# Patient Record
Sex: MALE
[De-identification: names, ages, dates, MRNs, and addresses within clinical notes are randomized per-mention and may not be internally consistent; named-entity substitution may affect disease eponyms.]

---

## 2017-07-05 ENCOUNTER — HOSPITAL ENCOUNTER (INPATIENT)
Dept: HOSPITAL 42 - ED | Age: 57
LOS: 3 days | Discharge: NURSING, REHAB OR LONG-TERM CARE | DRG: 593 | End: 2017-07-08
Attending: INTERNAL MEDICINE | Admitting: INTERNAL MEDICINE
Payer: MEDICARE

## 2017-07-05 VITALS — BODY MASS INDEX: 52.4 KG/M2

## 2017-07-05 DIAGNOSIS — Z87.891: ICD-10-CM

## 2017-07-05 DIAGNOSIS — I48.91: ICD-10-CM

## 2017-07-05 DIAGNOSIS — B95.62: ICD-10-CM

## 2017-07-05 DIAGNOSIS — E78.5: ICD-10-CM

## 2017-07-05 DIAGNOSIS — L97.919: Primary | ICD-10-CM

## 2017-07-05 DIAGNOSIS — Z86.14: ICD-10-CM

## 2017-07-05 DIAGNOSIS — I50.9: ICD-10-CM

## 2017-07-05 DIAGNOSIS — F81.9: ICD-10-CM

## 2017-07-05 DIAGNOSIS — E66.01: ICD-10-CM

## 2017-07-05 DIAGNOSIS — L85.3: ICD-10-CM

## 2017-07-05 DIAGNOSIS — L03.116: ICD-10-CM

## 2017-07-05 DIAGNOSIS — I11.0: ICD-10-CM

## 2017-07-05 DIAGNOSIS — L03.115: ICD-10-CM

## 2017-07-05 DIAGNOSIS — G62.9: ICD-10-CM

## 2017-07-05 DIAGNOSIS — Z79.899: ICD-10-CM

## 2017-07-05 DIAGNOSIS — R40.2412: ICD-10-CM

## 2017-07-05 DIAGNOSIS — G40.909: ICD-10-CM

## 2017-07-05 DIAGNOSIS — E11.622: ICD-10-CM

## 2017-07-05 DIAGNOSIS — I89.0: ICD-10-CM

## 2017-07-05 DIAGNOSIS — L81.9: ICD-10-CM

## 2017-07-05 DIAGNOSIS — F79: ICD-10-CM

## 2017-07-05 DIAGNOSIS — I87.8: ICD-10-CM

## 2017-07-05 LAB
ALBUMIN SERPL-MCNC: 4.2 G/DL
ALBUMIN/GLOB SERPL: 1.2 {RATIO}
ALT SERPL-CCNC: 37 U/L
AST SERPL-CCNC: 28 U/L
BASOPHILS # BLD AUTO: 0.04 K/MM3
BASOPHILS NFR BLD: 0.5 %
BUN SERPL-MCNC: 12 MG/DL
CALCIUM SERPL-MCNC: 9.7 MG/DL
EOSINOPHIL # BLD: 0.1 10*3/UL
EOSINOPHIL NFR BLD: 1.6 %
ERYTHROCYTE [DISTWIDTH] IN BLOOD BY AUTOMATED COUNT: 12.8 %
GFR NON-AFRICAN AMERICAN: > 60
GRANULOCYTES # BLD: 6.64 10*3/UL
GRANULOCYTES NFR BLD: 76.1 %
HGB BLD-MCNC: 14.4 GM/DL
LYMPHOCYTES # BLD: 1.2 10*3/UL
LYMPHOCYTES NFR BLD AUTO: 14.2 %
MCH RBC QN AUTO: 29.9 PG
MCHC RBC AUTO-ENTMCNC: 33.6 G/DL
MCV RBC AUTO: 89 FL
MONOCYTES # BLD AUTO: 0.7 10*3/UL
MONOCYTES NFR BLD: 7.6 %
PLATELET # BLD: 299 10^3/UL
PMV BLD AUTO: 10.3 FL
RBC # BLD AUTO: 4.82 10^6/UL
WBC # BLD AUTO: 8.7 10^3/UL

## 2017-07-05 RX ADMIN — VANCOMYCIN HYDROCHLORIDE SCH MLS/HR: 1 INJECTION, POWDER, LYOPHILIZED, FOR SOLUTION INTRAVENOUS at 17:26

## 2017-07-05 RX ADMIN — INSULIN HUMAN SCH UNITS: 100 INJECTION, SOLUTION PARENTERAL at 17:28

## 2017-07-05 RX ADMIN — CEFEPIME SCH MLS/HR: 1 INJECTION, SOLUTION INTRAVENOUS at 17:21

## 2017-07-05 RX ADMIN — CEFEPIME SCH MLS/HR: 1 INJECTION, SOLUTION INTRAVENOUS at 22:10

## 2017-07-05 RX ADMIN — INSULIN HUMAN SCH: 100 INJECTION, SOLUTION PARENTERAL at 23:06

## 2017-07-05 RX ADMIN — DILTIAZEM HYDROCHLORIDE SCH MG: 120 CAPSULE, COATED, EXTENDED RELEASE ORAL at 16:27

## 2017-07-05 NOTE — US
HISTORY:

Leg pain and swelling. Evaluate for DVT



PHYSICIAN(S):  Con Armstrong MD.



TECHNIQUE:

Duplex sonography and color-flow Doppler with graded compression were 

used to evaluate the deep venous systems of both lower extremities. 

The exam is limited by body habitus and edema. The calf veins are not 

adequately seen.



FINDINGS:

The visualized deep venous systems of both lower extremities are 

sonographically normal and compressible. Normal wave forms and 

augmentation are seen. There is no sonographic evidence for deep 

venous thrombosis in the visualized segments of both lower 

extremities.



IMPRESSION:

No sonographic evidence for deep venous thrombosis in the visualized 

segments of both lower extremities. 



Limited exam

## 2017-07-05 NOTE — ED PDOC
Arrival/HPI





- General


Chief Complaint: Lower Extremity Problem/Injury


Time Seen by Provider: 07/05/17 11:07


Historian: Patient, Caregiver





- History of Present Illness


Narrative History of Present Illness (Text): 


07/05/17 11:38


A 57 year old male was sent into the emergency department by podiatrist for 

possible cellulitis to bilateral lower extremities. Caregiver reports patient 

has failed several outpatient antibiotic treatments. Patient denies any fever, 

chills, nausea, vomiting, diarrhea, abdominal pain, chest pain, shortness of 

breath or any other complaints. 





PMD: Dr. Peraza


Podiatrist: Dr. Ramirez





Time/Duration: < month


Symptom Course: Unchanged


Quality: Other


Context: Other





Past Medical History





- Provider Review


Nursing Documentation Reviewed: Yes





- Infectious Disease


Hx of Infectious Diseases: None





- Tetanus Immunization


Tetanus Immunization: Unknown





- Cardiac


Hx Cardiac Disorders: Yes


Hx Congestive Heart Failure: Yes


Hx Hypertension: Yes





- Pulmonary


Hx Chronic Obstructive Pulmonary Disease (COPD): No





- Neurological


Other/Comment: Epilepsy





- HEENT


Hx HEENT Disorder: Yes


Other/Comment: wears glasses





- Renal


Hx Renal Failure: No





- Endocrine/Metabolic


Hx Diabetes Mellitus Type 2: Yes





- Hematological/Oncological


Hx Blood Disorders: No


Hx AIDS: No


Hx Anemia: No


Hx Cancer: No


Hx Chemotherapy: No


Hx Cirrhosis: No


Hx Hepatitis A: No


Hx Hepatitis B: No


Hx Hepatitis C: No


Hx Metastasis: No


Hx Shingles: No


Hx Unexplained Bleeding: No


Other/Comment: blood infection





- Integumentary


Hx Dermatological Disorder: Yes


Hx Basal Cell Carcinoma: No


Hx Eczema: No


Hx Melanoma: No


Hx Psoriasis: No


Hx Squamous Cell Carcinoma: No


Other/Comment: multiple wounds on the rt leg, cellulitis in b/l extreminity. 

Left thigh cellulitis





- Musculoskeletal/Rheumatological


Hx Falls: Yes





- Gastrointestinal


Hx Gastrointestinal Disorders: No


Hx Colostomy: No


Hx Crohn's Disease: No


Hx Diverticulitis: No


Hx Gall Bladder Disease: No


Hx Gastroesophageal Reflux: No


Hx Ileostomy: No


Hx Liver Failure: No


Hx Pancreatitis: No


HX Swallowing Problems: No





- Genitourinary/Gynecological


Hx Genitourinary Disorders: No


Hx Hematuria: No


Hx Incontinence: No


Hx Prostate Problems: No


Hx Sexually Transmitted Diseases: No


Hx Urinary Tract Infection: No





- Psychiatric


Hx Psychophysiologic Disorder: Yes


Hx Substance Use: No


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





- Surgical History


Hx Amputation: No


Hx Appendectomy: No


Hx Cholecystectomy: No


Hx Gastric Bypass Surgery: No


Hx Hysterectomy: No


Hx Joint Replacement: No


Hx Kidney Transplant: No


Hx Liver Transplant: No


Hx Mastectomy: No


Hx Musculoskeletal Surgery: No


Hx Open Heart Surgery: No


Hx Orthopedic Surgery: No


Hx Splenectomy: No


Hx Valve Replacement: No





- Anesthesia


Hx Anesthesia: Yes


Hx Anesthesia Reactions: No


Hx Malignant Hyperthermia: No





- Suicidal Assessment


Feels Threatened In Home Enviroment: No





Family/Social History





- Physician Review


Nursing Documentation Reviewed: Yes


Family/Social History: No Known Family HX


Smoking Status: Former Smoker


Hx Alcohol Use: No


Hx Substance Use: No


Hx Substance Use Treatment: No





Allergies/Home Meds


Allergies/Adverse Reactions: 


Allergies





No Known Allergies Allergy (Verified 07/05/17 13:00)


 








Home Medications: 


 Home Meds











 Medication  Instructions  Recorded  Confirmed


 


Omeprazole [Prilosec] 20 mg PO DAILY 08/28/15 07/05/17


 


Ramipril [Altace] 10 mg PO DAILY 08/28/15 07/05/17


 


Glipizide 10 mg PO [facility] 11/22/15 07/05/17


 


Pioglitazone [Actos] 45 mg PO DAILY #0  03/09/16 07/05/17


 


Potassium Chloride [K-Tab ER] 20 meq PO DAILY #0  03/09/16 07/05/17


 


Atorvastatin [Lipitor] 10 mg PO DIN 08/27/16 07/05/17


 


Iron Fumarate/Vit C/Vit B12/FA 1 cap PO DAILY 08/29/16 07/05/17





[Trigels-F Forte Softgel]   


 


Insulin Glargine,Hum.rec.anlog 45 unit SQ  07/05/17 07/05/17





[Lantus Solostar]   


 


Metformin HCl [Glucophage] 1,000 mg PO ACBD 07/05/17 07/05/17














Review of Systems





- Physician Review


All systems were reviewed & negative as marked: Yes





- Review of Systems


Constitutional: absent: Fevers, Night Sweats


Respiratory: absent: SOB


Cardiovascular: absent: Chest Pain


Gastrointestinal: absent: Abdominal Pain, Diarrhea, Nausea, Vomiting


Skin: Cellulitis (to bilateral lower extremities)





Physical Exam


Vital Signs Reviewed: Yes


Vital Signs











  Temp Pulse Resp BP Pulse Ox


 


 07/05/17 12:06   79  18  144/79  97


 


 07/05/17 11:12   89  18  146/81  97


 


 07/05/17 11:02  97.9 F  96 H  16  146/81  98











Temperature: Afebrile


Blood Pressure: Normal


Pulse: Tachycardic


Respiratory Rate: Normal


Appearance: Positive for: Well-Appearing, Non-Toxic, Comfortable


Pain Distress: None


Mental Status: Positive for: Alert and Oriented X 3


Finger Stick Blood Glucose: 411





- Systems Exam


Head: Present: Atraumatic, Normocephalic


Pupils: Present: PERRL


Extroacular Muscles: Present: EOMI


Conjunctiva: Present: Normal


Mouth: Present: Moist Mucous Membranes


Neck: Present: Normal Range of Motion


Respiratory/Chest: Present: Clear to Auscultation, Good Air Exchange.  No: 

Respiratory Distress, Accessory Muscle Use


Cardiovascular: Present: Regular Rate and Rhythm, Normal S1, S2.  No: Murmurs


Abdomen: Present: Normal Bowel Sounds.  No: Tenderness, Distention, Peritoneal 

Signs


Back: Present: Normal Inspection


Upper Extremity: Present: Normal Inspection.  No: Cyanosis, Edema


Lower Extremity: Present: NORMAL PULSES, Neurovascularly Intact, Other (

bilateral lower extremity cellulitis).  No: Edema, CALF TENDERNESS, Deformity


Neurological: Present: GCS=15, CN II-XII Intact, Speech Normal


Skin: Present: Warm, Dry, Normal Color.  No: Rashes


Psychiatric: Present: Alert, Oriented x 3, Normal Insight, Normal Concentration





Medical Decision Making


ED Course and Treatment: 


07/05/17 11:38


Impression:


A 57 year old male with cellulitis to bilateral lower extremities





Plan:


-- Lower extremity duplex ultrasound 


-- Labs


-- Blood and Wound culture


-- IV fluids, Vancomycin and insulin


-- Reassess and disposition





Progress Notes:


07/05/17 12:52


Ultrasound is negative for DVT.








- Lab Interpretations


Lab Results: 








 07/05/17 11:15 





 07/05/17 11:15 





 Lab Results





07/05/17 11:15: Sodium 134, Potassium 4.5, Chloride 99, Carbon Dioxide 24, 

Anion Gap 16, BUN 12, Creatinine 0.6, Est GFR (African Amer) > 60, Est GFR (Non-

Af Amer) > 60, Random Glucose 452 H* D, Calcium 9.7, Total Bilirubin 0.7, AST 28

, ALT 37, Alkaline Phosphatase 135 H, Total Protein 7.8, Albumin 4.2, Globulin 

3.5, Albumin/Globulin Ratio 1.2


07/05/17 11:15: WBC 8.7, RBC 4.82, Hgb 14.4, Hct 42.9, MCV 89.0, MCH 29.9, MCHC 

33.6, RDW 12.8, Plt Count 299, MPV 10.3, Gran % 76.1 H, Lymph % (Auto) 14.2 L, 

Mono % (Auto) 7.6 H, Eos % (Auto) 1.6, Baso % (Auto) 0.5, Gran # 6.64 H, Lymph 

# 1.2, Mono # 0.7 H, Eos # 0.1, Baso # 0.04











- RAD Interpretation


Radiology Orders: 








07/05/17 12:05


DUPLEX LOWER EXTRM VEIN BILAT [US] Stat 














- Medication Orders


Current Medication Orders: 








Atorvastatin Calcium (Lipitor)  10 mg PO DIN [facility]


   Last Admin: 07/05/17 17:29  Dose: 10 mg





Diltiazem HCl (Cardizem Cd)  120 mg PO DAILY [facility]


   Last Admin: 07/05/17 16:27  Dose: 120 mg





Furosemide (Lasix)  20 mg PO DAILY [facility]


   Last Admin: 07/05/17 17:29  Dose: 20 mg





Gabapentin (Neurontin)  400 mg PO TID [facility]


   PRN Reason: Protocol


   Last Admin: 07/05/17 17:29  Dose: 400 mg





Glipizide (Glucotrol)  10 mg PO ACBD [facility]


   Last Admin: 07/05/17 17:27  Dose: 10 mg





Cefepime HCl (Maxipime 1gm)  1 gm in 100 mls @ 100 mls/hr IVPB Q8 [facility]


   PRN Reason: Protocol


   Last Admin: 07/05/17 22:10  Dose: 100 mls/hr





Vancomycin HCl (Vancomycin 1gm)  1 gm in 250 mls @ 167 mls/hr IVPB Q12H [facility]


   PRN Reason: Protocol


   Last Admin: 07/05/17 17:26  Dose: 167 mls/hr





Insulin Human Regular (Humulin R High)  0 units SC ACHS [facility]


   PRN Reason: Protocol


   Last Admin: 07/05/17 17:28  Dose: 10 units





Metformin HCl (Glucophage)  1,000 mg PO ACBD [facility]


   Last Admin: 07/05/17 17:27  Dose: 1,000 mg





Potassium Chloride (K-Dur 20 Meq Er Tab)  20 meq PO DAILY [facility]


Ramipril (Altace)  10 mg PO DAILY [facility]


Warfarin Sodium (Coumadin)  10 mg PO DAILY [facility]


   PRN Reason: Protocol


Warfarin Sodium (Coumadin)  2 mg PO DAILY [facility]


   PRN Reason: Protocol





Discontinued Medications





Vancomycin HCl (Vancomycin 1gm)  1 gm in 250 mls @ 167 mls/hr IVPB STAT STA


   PRN Reason: Protocol


   Stop: 07/05/17 13:34


   Last Admin: 07/05/17 12:27  Dose: 167 mls/hr





Sodium Chloride (Sodium Chloride 0.9%)  1,000 mls @ 200 mls/hr IV .Q5H [facility]


   Last Admin: 07/05/17 12:26  Dose: 200 mls/hr





Insulin Human Regular (Humulin R)  6 units IVP STAT STA


   Stop: 07/05/17 12:09


   Last Admin: 07/05/17 12:29  Dose: 6 units





Pneumococcal Polyvalent Vaccine (Pneumovax 23 Vaccine)  0.5 ml IM .ONCE ONE


   Stop: 07/05/17 19:02











- Scribe Statement


The provider has reviewed the documentation as recorded by the Stanislaw Stone





Provider Scribe Attestation:


All medical record entries made by the Garrettibdaniella were at my direction and 

personally dictated by me. I have reviewed the chart and agree that the record 

accurately reflects my personal performance of the history, physical exam, 

medical decision making, and the department course for this patient. I have 

also personally directed, reviewed, and agree with the discharge instructions 

and disposition.








Disposition/Present on Arrival





- Present on Arrival


Any Indicators Present on Arrival: Yes


History of DVT/PE: No


History of Uncontrolled Diabetes: Yes


Urinary Catheter: No


History of Decub. Ulcer: No


History Surgical Site Infection Following: None





- Disposition


Have Diagnosis and Disposition been Completed?: Yes


Diagnosis: 


 Cellulitis





Disposition: HOSPITALIZED


Disposition Time: 12:05


Condition: STABLE

## 2017-07-06 VITALS — RESPIRATION RATE: 20 BRPM

## 2017-07-06 LAB
INR PPP: 1.01
PROTHROMBIN TIME: 10.9 SECONDS

## 2017-07-06 RX ADMIN — DILTIAZEM HYDROCHLORIDE SCH MG: 120 CAPSULE, COATED, EXTENDED RELEASE ORAL at 09:41

## 2017-07-06 RX ADMIN — VANCOMYCIN HYDROCHLORIDE SCH MLS/HR: 1 INJECTION, POWDER, LYOPHILIZED, FOR SOLUTION INTRAVENOUS at 04:05

## 2017-07-06 RX ADMIN — INSULIN HUMAN SCH UNITS: 100 INJECTION, SOLUTION PARENTERAL at 08:00

## 2017-07-06 RX ADMIN — POTASSIUM CHLORIDE SCH MEQ: 20 TABLET, EXTENDED RELEASE ORAL at 09:41

## 2017-07-06 RX ADMIN — INSULIN HUMAN SCH UNITS: 100 INJECTION, SOLUTION PARENTERAL at 11:25

## 2017-07-06 RX ADMIN — CEFEPIME SCH MLS/HR: 1 INJECTION, SOLUTION INTRAVENOUS at 21:21

## 2017-07-06 RX ADMIN — INSULIN HUMAN SCH UNITS: 100 INJECTION, SOLUTION PARENTERAL at 17:07

## 2017-07-06 RX ADMIN — CEFEPIME SCH MLS/HR: 1 INJECTION, SOLUTION INTRAVENOUS at 06:14

## 2017-07-06 RX ADMIN — CEFEPIME SCH MLS/HR: 1 INJECTION, SOLUTION INTRAVENOUS at 13:18

## 2017-07-06 RX ADMIN — VANCOMYCIN HYDROCHLORIDE SCH MLS/HR: 1 INJECTION, POWDER, LYOPHILIZED, FOR SOLUTION INTRAVENOUS at 17:05

## 2017-07-06 NOTE — CP.PCM.HP
History of Present Illness





- History of Present Illness


History of Present Illness: 





Pt sent due to R leg ulcer. He has a hx of MRSA and frequent infections in the 

legs. He has chronic skin changes and chronic wounds. He denies SOB/CP/N/V/HA/

Fever. He is no able to give a good history due to his cognitive issues.  





Present on Admission





- Present on Admission


Any Indicators Present on Admission: Yes


History of DVT/PE: Yes





Review of Systems





- Constitutional


Constitutional: absent: Frequent Falls, Lethargy, Malaise





- EENT


Eyes: absent: Diplopia, Loss of Peripheral Vision, Photophobia


Ears: absent: Disequilibrium


Nose/Mouth/Throat: absent: Dry Mouth





- Cardiovascular


Cardiovascular: Leg Edema.  absent: Claudication





- Respiratory


Respiratory: absent: Cough





Past Patient History





- Infectious Disease


Hx of Infectious Diseases: None





- Tetanus Immunizations


Tetanus Immunization: Unknown





- Past Medical History & Family History


Past Medical History?: Yes





- Past Social History


Smoking Status: Former Smoker





- CARDIAC


Hx Cardiac Disorders: Yes


Hx Congestive Heart Failure: Yes


Hx Hypertension: Yes





- PULMONARY


Hx Chronic Obstructive Pulmonary Disease (COPD): No





- NEUROLOGICAL


Other/Comment: Epilepsy





- HEENT


Hx HEENT Problems: Yes


Other/Comment: wears glasses





- RENAL


Hx Renal Failure: No





- ENDOCRINE/METABOLIC


Hx Diabetes Mellitus Type 2: Yes





- HEMATOLOGICAL/ONCOLOGICAL


Hx Blood Disorders: No


Hx AIDS: No


Hx Anemia: No


Hx Cancer: No


Hx Chemotherapy: No


Hx Cirrhosis: No


Hx Hepatitis A: No


Hx Hepatitis B: No


Hx Hepatitis C: No


Hx Metastesis: No


Hx Shingles: No


Hx Unexplained Bleeding: No


Other/Comment: blood infection





- INTEGUMENTARY


Hx Dermatological Problems: Yes


Hx Basil Cell: No


Hx Eczema: No


Hx Melanoma: No


Hx Psoriasis: No


Hx Squamous Cell: No


Other/Comment: multiple wounds on the rt leg, cellulitis in b/l extreminity. 

Left thigh cellulitis





- MUSCULOSKELETAL/RHEUMATOLOGICAL


Hx Falls: Yes





- GASTROINTESTINAL


Hx Gastrointestinal Disorders: No


Hx Colostomy: No


Hx Crohn's Disease: No


Hx Diverticulitis: No


Hx Gall Bladder Disease: No


Hx Gastroesophageal Reflux: No


Hx Ileostomy: No


Hx Liver Failure: No


Hx Pancreatitis: No


HX Swallowing Problems: No





- GENITOURINARY/GYNECOLOGICAL


Hx Genitourinary Disorders: No


Hx Hematuria: No


Hx Incontinence: No


Hx Prostate Problems: No


Hx Sexually Transmitted Disorders: No


Hx Urinary Tract Infection: No





- PSYCHIATRIC


Hx Psychophysiologic Disorder: Yes


Hx Substance Use: No


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





- SURGICAL HISTORY


Hx Amputation: No


Hx Appendectomy: No


Hx Cholecystectomy: No


Hx Gastric Bypass Surgery: No


Hx Hysterectomy: No


Hx Joint Replacement: No


Hx Kidney Transplant: No


Hx Liver Transplant: No


Hx Mastectomy: No


Hx Musculoskeletal Surgery: No


Hx Open Heart Surgery: No


Hx Orthopedic Surgery: No


Hx Splenectomy: No


Hx Valve Replacement: No





- ANESTHESIA


Hx Anesthesia: Yes


Hx Anesthesia Reactions: No


Hx Malignant Hyperthermia: No





Meds


Allergies/Adverse Reactions: 


 Allergies











Allergy/AdvReac Type Severity Reaction Status Date / Time


 


No Known Allergies Allergy   Verified 07/05/17 13:00














Physical Exam





- Head Exam


Head Exam: ATRAUMATIC, NORMAL INSPECTION, NORMOCEPHALIC





- Eye Exam


Eye Exam: EOMI, Normal appearance, PERRL


Pupil Exam: NORMAL ACCOMODATION, PERRL





- ENT Exam


ENT Exam: Mucous Membranes Moist, Normal Exam





- Neck Exam


Neck exam: Positive for: Normal Inspection





- Respiratory Exam


Respiratory Exam: Clear to Auscultation Bilateral, NORMAL BREATHING PATTERN





- Cardiovascular Exam


Cardiovascular Exam: RRR, +S1, +S2





- GI/Abdominal Exam


GI & Abdominal Exam: Normal Bowel Sounds, Soft.  absent: Organomegaly, 

Pulsatile Mass, Tenderness





-  Exam


 Exam: NORMAL INSPECTION





- Back Exam


Back exam: NORMAL INSPECTION.  absent: CVA tenderness (R)





- Neurological Exam


Neurological exam: Alert, CN II-XII Intact, Normal Gait, Oriented x3, Reflexes 

Normal





- Skin


Skin Exam: Erythema, Pallor, Rash





Results





- Vital Signs


Recent Vital Signs: 





 Last Vital Signs











Temp  98.2 F   07/06/17 16:30


 


Pulse  72   07/06/17 16:30


 


Resp  20   07/06/17 16:30


 


BP  164/84 H  07/06/17 16:30


 


Pulse Ox  99   07/06/17 16:30














- Labs


Result Diagrams: 


 07/05/17 11:15





 07/05/17 11:15


Labs: 





 Laboratory Results - last 24 hr











  07/05/17 07/06/17





  13:08 06:45


 


PT   10.9


 


INR   1.01


 


POC Glucose (mg/dL)  349 H 














Assessment & Plan





- Assessment and Plan (Free Text)


Assessment: 





Cellulitis


DM-2


A Fib


Obesit


HTN


Dylipidemia





Plan: 





Pt will need to be admited for IV Abx. He is going to be seen by Dr Ramirez 

from Podiatry and by ID. He is going to be covered by Insulin. He will be given 

Coumadin for his A fib. Spoke to Dr Ramirez. 





- Date & Time


Date: 07/06/17


Time: 08:25

## 2017-07-06 NOTE — CP.PCM.CON
<Danae Iyer - Last Filed: 07/06/17 10:43>





History of Present Illness





- History of Present Illness


History of Present Illness: 


57 year old male with PMHx including DM, CHF, hypertension, atrial fibrillation

, elephantiasis, dyslipidemia, was seen today with attending Dr. Ramirez 

regarding  right lower extremity ulcerations and bilateral lower extremity 

chronic edema. Patient is resting comfortably in bed. Bilateral lower 

extremitie bandages are clean dry and intact. No new complaints at this time. 

Patient denies any n/v/f/c/sob/cp. 





Past Patient History





- Infectious Disease


Hx of Infectious Diseases: None





- Tetanus Immunizations


Tetanus Immunization: Unknown





- Past Medical History & Family History


Past Medical History?: Yes





- Past Social History


Smoking Status: Former Smoker





- CARDIAC


Hx Cardiac Disorders: Yes


Hx Congestive Heart Failure: Yes


Hx Hypertension: Yes





- PULMONARY


Hx Chronic Obstructive Pulmonary Disease (COPD): No





- NEUROLOGICAL


Other/Comment: Epilepsy





- HEENT


Hx HEENT Problems: Yes


Other/Comment: wears glasses





- RENAL


Hx Renal Failure: No





- ENDOCRINE/METABOLIC


Hx Diabetes Mellitus Type 2: Yes





- HEMATOLOGICAL/ONCOLOGICAL


Hx Blood Disorders: No


Hx AIDS: No


Hx Anemia: No


Hx Cancer: No


Hx Chemotherapy: No


Hx Cirrhosis: No


Hx Hepatitis A: No


Hx Hepatitis B: No


Hx Hepatitis C: No


Hx Metastesis: No


Hx Shingles: No


Hx Unexplained Bleeding: No


Other/Comment: blood infection





- INTEGUMENTARY


Hx Dermatological Problems: Yes


Hx Basil Cell: No


Hx Eczema: No


Hx Melanoma: No


Hx Psoriasis: No


Hx Squamous Cell: No


Other/Comment: multiple wounds on the rt leg, cellulitis in b/l extreminity. 

Left thigh cellulitis





- MUSCULOSKELETAL/RHEUMATOLOGICAL


Hx Falls: Yes





- GASTROINTESTINAL


Hx Gastrointestinal Disorders: No


Hx Colostomy: No


Hx Crohn's Disease: No


Hx Diverticulitis: No


Hx Gall Bladder Disease: No


Hx Gastroesophageal Reflux: No


Hx Ileostomy: No


Hx Liver Failure: No


Hx Pancreatitis: No


HX Swallowing Problems: No





- GENITOURINARY/GYNECOLOGICAL


Hx Genitourinary Disorders: No


Hx Hematuria: No


Hx Incontinence: No


Hx Prostate Problems: No


Hx Sexually Transmitted Disorders: No


Hx Urinary Tract Infection: No





- PSYCHIATRIC


Hx Psychophysiologic Disorder: Yes


Hx Substance Use: No


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





- SURGICAL HISTORY


Hx Amputation: No


Hx Appendectomy: No


Hx Cholecystectomy: No


Hx Gastric Bypass Surgery: No


Hx Hysterectomy: No


Hx Joint Replacement: No


Hx Kidney Transplant: No


Hx Liver Transplant: No


Hx Mastectomy: No


Hx Musculoskeletal Surgery: No


Hx Open Heart Surgery: No


Hx Orthopedic Surgery: No


Hx Splenectomy: No


Hx Valve Replacement: No





- ANESTHESIA


Hx Anesthesia: Yes


Hx Anesthesia Reactions: No


Hx Malignant Hyperthermia: No





Meds


Allergies/Adverse Reactions: 


 Allergies











Allergy/AdvReac Type Severity Reaction Status Date / Time


 


No Known Allergies Allergy   Verified 07/05/17 13:00














- Medications


Medications: 


 Current Medications





Atorvastatin Calcium (Lipitor)  10 mg PO DIN [facility]


   Last Admin: 07/05/17 17:29 Dose:  10 mg


Diltiazem HCl (Cardizem Cd)  120 mg PO DAILY [facility]


   Last Admin: 07/06/17 09:41 Dose:  120 mg


Furosemide (Lasix)  20 mg PO DAILY [facility]


   Last Admin: 07/06/17 09:41 Dose:  20 mg


Gabapentin (Neurontin)  400 mg PO TID [facility]


   PRN Reason: Protocol


   Last Admin: 07/06/17 09:40 Dose:  400 mg


Glipizide (Glucotrol)  10 mg PO ACBD [facility]


   Last Admin: 07/06/17 08:00 Dose:  10 mg


Cefepime HCl (Maxipime 1gm)  1 gm in 100 mls @ 100 mls/hr IVPB Q8 [facility]


   PRN Reason: Protocol


   Last Admin: 07/06/17 06:14 Dose:  100 mls/hr


Vancomycin HCl (Vancomycin 1gm)  1 gm in 250 mls @ 167 mls/hr IVPB Q12H [facility]


   PRN Reason: Protocol


   Last Admin: 07/06/17 04:05 Dose:  167 mls/hr


Insulin Human Regular (Humulin R High)  0 units SC ACHS [facility]


   PRN Reason: Protocol


   Last Admin: 07/06/17 08:00 Dose:  7 units


Metformin HCl (Glucophage)  1,000 mg PO ACBD [facility]


   Last Admin: 07/06/17 08:00 Dose:  1,000 mg


Potassium Chloride (K-Dur 20 Meq Er Tab)  20 meq PO DAILY [facility]


   Last Admin: 07/06/17 09:41 Dose:  20 meq


Ramipril (Altace)  10 mg PO DAILY [facility]


   Last Admin: 07/06/17 09:40 Dose:  10 mg


Warfarin Sodium (Coumadin)  10 mg PO DAILY [facility]


   PRN Reason: Protocol


   Last Admin: 07/06/17 09:41 Dose:  10 mg


Warfarin Sodium (Coumadin)  2 mg PO DAILY [facility]


   PRN Reason: Protocol


   Last Admin: 07/06/17 09:41 Dose:  2 mg











Physical Exam





- Constitutional


Appears: No Acute Distress





- Extremities Exam


Additional comments: 


Lower extremity focused exam:





VASC: Diffuse edema of bilateral lower extremities. Skin temperature is 

increased on the right





NEURO: Gross sensation intact





DERM: Open ulceration to right lower extremity with granular base at the 

lateral aspect of the leg with sanguinous drainage, no purulence, no erythema, 

moderate malodor. Two additional small superficial ulcerations present to the 

anterior and lateral leg with sanguinous drainage. Surrounding skin is 

erythematous and hyperpigmented. Left leg has an open ulcer to the lateral 

aspect just distal to the knee, mild xerosis and hyperpigmentation. 





ORTHO: Negative pain on palpation to the lower extremities





- Neurological Exam


Neurological exam: Alert, Oriented x3





- Psychiatric Exam


Psychiatric exam: Normal Affect, Normal Mood





Results





- Vital Signs


Recent Vital Signs: 


 Last Vital Signs











Temp  97.6 F   07/06/17 08:12


 


Pulse  89   07/06/17 09:41


 


Resp  20   07/06/17 08:12


 


BP  170/88 H  07/06/17 09:41


 


Pulse Ox  94 L  07/06/17 08:12














- Labs


Result Diagrams: 


 07/05/17 11:15





 07/05/17 11:15


Labs: 


 Laboratory Results - last 24 hr











  07/05/17 07/06/17





  16:13 06:45


 


PT   10.9


 


INR   1.01


 


POC Glucose (mg/dL)  325 H 














Assessment & Plan





- Assessment and Plan (Free Text)


Assessment: 


57 year old male with right lower extremity cellulitis with venous stasis 

ulceration and bilateral chronic lower extremity edema. 


Plan: 


Patient examined and evaluated with an attending Dr. Ramirez


Patients bilateral legs cleansed with saline


LE US negative for DVT


Right leg dressed with xeroform, ABD, kerlix


Left leg dressed with maxorb, optifoam


Patient to continue IV abx 


Podiatry will continue to follow, pt will f/u in the wound care center after DC 





<Leah Ramirez - Last Filed: 07/08/17 14:52>





Results





- Vital Signs


Recent Vital Signs: 


 Last Vital Signs











Temp  98.2 F   07/08/17 08:00


 


Pulse  65   07/08/17 08:00


 


Resp  20   07/08/17 08:00


 


BP  178/88 H  07/08/17 09:24


 


Pulse Ox  94 L  07/08/17 08:00














- Labs


Result Diagrams: 


 07/05/17 11:15





 07/05/17 11:15





Attending/Attestation





- Attestation


I have personally seen and examined this patient.: Yes


I have fully participated in the care of the patient.: Yes


I have reviewed all pertinent clinical information: Yes

## 2017-07-07 VITALS — OXYGEN SATURATION: 94 %

## 2017-07-07 RX ADMIN — INSULIN HUMAN SCH UNITS: 100 INJECTION, SOLUTION PARENTERAL at 08:18

## 2017-07-07 RX ADMIN — INSULIN HUMAN SCH: 100 INJECTION, SOLUTION PARENTERAL at 22:28

## 2017-07-07 RX ADMIN — CEFEPIME SCH MLS/HR: 1 INJECTION, SOLUTION INTRAVENOUS at 14:39

## 2017-07-07 RX ADMIN — VANCOMYCIN HYDROCHLORIDE SCH MLS/HR: 1 INJECTION, POWDER, LYOPHILIZED, FOR SOLUTION INTRAVENOUS at 16:53

## 2017-07-07 RX ADMIN — INSULIN HUMAN SCH: 100 INJECTION, SOLUTION PARENTERAL at 11:38

## 2017-07-07 RX ADMIN — DILTIAZEM HYDROCHLORIDE SCH MG: 120 CAPSULE, COATED, EXTENDED RELEASE ORAL at 09:22

## 2017-07-07 RX ADMIN — CEFEPIME SCH MLS/HR: 1 INJECTION, SOLUTION INTRAVENOUS at 21:54

## 2017-07-07 RX ADMIN — CEFEPIME SCH MLS/HR: 1 INJECTION, SOLUTION INTRAVENOUS at 05:29

## 2017-07-07 RX ADMIN — POTASSIUM CHLORIDE SCH MEQ: 20 TABLET, EXTENDED RELEASE ORAL at 09:21

## 2017-07-07 RX ADMIN — INSULIN HUMAN SCH: 100 INJECTION, SOLUTION PARENTERAL at 16:52

## 2017-07-07 RX ADMIN — VANCOMYCIN HYDROCHLORIDE SCH MLS/HR: 1 INJECTION, POWDER, LYOPHILIZED, FOR SOLUTION INTRAVENOUS at 05:29

## 2017-07-07 NOTE — CP.PCM.PN
Subjective





- Date & Time of Evaluation


Date of Evaluation: 07/07/17


Time of Evaluation: 08:29





- Subjective


Subjective: 





Pt with no pain. Eating well. Sleeps well. 





Objective





- Vital Signs/Intake and Output


Vital Signs (last 24 hours): 


 











Temp Pulse Resp BP Pulse Ox


 


 98.5 F   73   20   170/96 H  97 


 


 07/07/17 07:30  07/07/17 07:30  07/07/17 07:30  07/07/17 07:30  07/07/17 07:30








Intake and Output: 


 











 07/07/17 07/07/17





 06:59 18:59


 


Intake Total 960 


 


Output Total 1250 


 


Balance -290 














- Medications


Medications: 


 Current Medications





Atorvastatin Calcium (Lipitor)  10 mg PO DIN [facility]


   Last Admin: 07/06/17 17:07 Dose:  10 mg


Diltiazem HCl (Cardizem Cd)  120 mg PO DAILY [facility]


   Last Admin: 07/06/17 09:41 Dose:  120 mg


Furosemide (Lasix)  20 mg PO DAILY [facility]


   Last Admin: 07/06/17 09:41 Dose:  20 mg


Gabapentin (Neurontin)  400 mg PO TID [facility]


   PRN Reason: Protocol


   Last Admin: 07/06/17 17:07 Dose:  400 mg


Glipizide (Glucotrol)  10 mg PO ACBD [facility]


   Last Admin: 07/06/17 17:07 Dose:  10 mg


Cefepime HCl (Maxipime 1gm)  1 gm in 100 mls @ 100 mls/hr IVPB Q8 [facility]


   PRN Reason: Protocol


   Last Admin: 07/07/17 05:29 Dose:  100 mls/hr


Vancomycin HCl (Vancomycin 1gm)  1 gm in 250 mls @ 167 mls/hr IVPB Q12H [facility]


   PRN Reason: Protocol


   Last Admin: 07/07/17 05:29 Dose:  167 mls/hr


Insulin Human Regular (Humulin R High)  0 units SC ACHS [facility]


   PRN Reason: Protocol


   Last Admin: 07/06/17 17:07 Dose:  4 units


Metformin HCl (Glucophage)  1,000 mg PO ACBD [facility]


   Last Admin: 07/06/17 17:07 Dose:  1,000 mg


Potassium Chloride (K-Dur 20 Meq Er Tab)  20 meq PO DAILY [facility]


   Last Admin: 07/06/17 09:41 Dose:  20 meq


Ramipril (Altace)  10 mg PO DAILY [facility]


   Last Admin: 07/06/17 09:40 Dose:  10 mg


Warfarin Sodium (Coumadin)  10 mg PO DAILY [facility]


   PRN Reason: Protocol


   Last Admin: 07/06/17 09:41 Dose:  10 mg


Warfarin Sodium (Coumadin)  2 mg PO DAILY [facility]


   PRN Reason: Protocol


   Last Admin: 07/06/17 09:41 Dose:  2 mg











- Labs


Labs: 


 











PT  10.9 Seconds (9.9-11.8)   07/06/17  06:45    


 


INR  1.01  (0.93-1.08)   07/06/17  06:45    














- Constitutional


Appears: Non-toxic





- Head Exam


Head Exam: NORMAL INSPECTION





- Eye Exam


Eye Exam: EOMI, Normal appearance, PERRL





- ENT Exam


ENT Exam: Mucous Membranes Moist, Normal Exam





- Neck Exam


Neck Exam: Full ROM, Normal Inspection.  absent: Lymphadenopathy





- Respiratory Exam


Respiratory Exam: Clear to Ausculation Bilateral, NORMAL BREATHING PATTERN.  

absent: Prolonged Expiratory Phase, Rales, Respiratory Distress





- Cardiovascular Exam


Cardiovascular Exam: REGULAR RHYTHM, +S1, +S2.  absent: Murmur





- GI/Abdominal Exam


GI & Abdominal Exam: Soft, Normal Bowel Sounds.  absent: Tenderness





- Rectal Exam


Rectal Exam: NORMAL INSPECTION





Assessment and Plan





- Assessment and Plan (Free Text)


Assessment: 





Cellulitis


DM-2


A Fib


Obesit


HTN


Dylipidemia








. 


Plan: 








Pt will need to be admited for IV Abx- Vanco. Dr Ramirez from Podiatry and by 

ID following. Will get Surgery evaluation with Dr Marshall, known to him very 

well. He is going to be covered by Insulin. He will be given Coumadin for his A 

fib. On ISS for DM-2. FS with coverage. On Neurontin for neuropathy. On 

Cardiezem for A fib. Lipitor for Dyslipidemia. TCU evaluation.

## 2017-07-07 NOTE — CP.PCM.PN
<Danae Iyer - Last Filed: 07/07/17 11:50>





Subjective





- Date & Time of Evaluation


Date of Evaluation: 07/07/17


Time of Evaluation: 11:50





- Subjective


Subjective: 


57 year old male was seen today resting in a chair at bedside regarding right 

lower extremity cellulitis, ulcerations and bilateral lower extremity chronic 

edema. Bilateral lower extremity bandages are clean dry and intact. No new 

complaints at this time. Patient denies any n/v/f/c/sob/cp. 





Objective





- Vital Signs/Intake and Output


Vital Signs (last 24 hours): 


 











Temp Pulse Resp BP Pulse Ox


 


 98.5 F   73   20   170/96 H  97 


 


 07/07/17 07:30  07/07/17 09:22  07/07/17 07:30  07/07/17 09:22  07/07/17 07:30








Intake and Output: 


 











 07/07/17 07/07/17





 06:59 18:59


 


Intake Total 960 


 


Output Total 1250 


 


Balance -290 














- Medications


Medications: 


 Current Medications





Atorvastatin Calcium (Lipitor)  10 mg PO DIN [facility]


   Last Admin: 07/06/17 17:07 Dose:  10 mg


Diltiazem HCl (Cardizem Cd)  120 mg PO DAILY [facility]


   Last Admin: 07/07/17 09:22 Dose:  120 mg


Furosemide (Lasix)  20 mg PO DAILY [facility]


   Last Admin: 07/07/17 09:21 Dose:  20 mg


Gabapentin (Neurontin)  400 mg PO TID [facility]


   PRN Reason: Protocol


   Last Admin: 07/07/17 09:26 Dose:  400 mg


Glipizide (Glucotrol)  10 mg PO ACBD [facility]


   Last Admin: 07/07/17 08:19 Dose:  10 mg


Cefepime HCl (Maxipime 1gm)  1 gm in 100 mls @ 100 mls/hr IVPB Q8 [facility]


   PRN Reason: Protocol


   Last Admin: 07/07/17 05:29 Dose:  100 mls/hr


Vancomycin HCl (Vancomycin 1gm)  1 gm in 250 mls @ 167 mls/hr IVPB Q12H [facility]


   PRN Reason: Protocol


   Last Admin: 07/07/17 05:29 Dose:  167 mls/hr


Insulin Human Regular (Humulin R High)  0 units SC ACHS [facility]


   PRN Reason: Protocol


   Last Admin: 07/07/17 08:18 Dose:  4 units


Metformin HCl (Glucophage)  1,000 mg PO ACBD [facility]


   Last Admin: 07/07/17 08:19 Dose:  1,000 mg


Potassium Chloride (K-Dur 20 Meq Er Tab)  20 meq PO DAILY [facility]


   Last Admin: 07/07/17 09:21 Dose:  20 meq


Ramipril (Altace)  10 mg PO DAILY [facility]


   Last Admin: 07/07/17 09:22 Dose:  10 mg


Warfarin Sodium (Coumadin)  10 mg PO DAILY [facility]


   PRN Reason: Protocol


   Last Admin: 07/07/17 09:25 Dose:  10 mg


Warfarin Sodium (Coumadin)  2 mg PO DAILY [facility]


   PRN Reason: Protocol


   Last Admin: 07/07/17 09:26 Dose:  2 mg











- Labs


Labs: 


 











PT  10.9 Seconds (9.9-11.8)   07/06/17  06:45    


 


INR  1.01  (0.93-1.08)   07/06/17  06:45    














- Constitutional


Appears: Non-toxic, No Acute Distress





- Extremities Exam


Additional comments: 


Lower extremity focused exam:





VASC: Diffuse edema of bilateral lower extremities. Skin temperature is 

increased on the right lower extremity compared to left





NEURO: Gross sensation intact





DERM: Open ulcerations to right lower extremity with granular base at the 

lateral aspect of the leg with sanguinous drainage, no purulence, no erythema, 

moderate malodor. Two additional small superficial ulcerations present to the 

anterior and lateral leg with sanguinous drainage. Surrounding skin is 

erythematous and hyperpigmented. Left leg has an open ulcer to the lateral 

aspect just distal to the knee with moderate amount of serous drainage, mild 

xerosis and hyperpigmentation. 





ORTHO: Negative pain on palpation to the lower extremities





- Neurological Exam


Neurological Exam: Alert, Awake





- Psychiatric Exam


Psychiatric exam: Normal Mood





Assessment and Plan





- Assessment and Plan (Free Text)


Assessment: 


57 year old male with right lower extremity cellulitis with venous stasis 

ulceration and bilateral chronic lower extremity edema. 


Plan: 


Patient examined and evaluated 


discussed in detail with attending Dr. Hobbs


Patients bilateral legs cleansed with saline


LE US negative for DVT


Right leg dressed with xeroform, ABD, kerlix


Left leg dressed with maxorb, optifoam


Patient to continue IV abx per ID


Podiatry will continue to follow, pt will f/u in the wound care center after DC 





<Manuel Hobbs - Last Filed: 07/07/17 12:18>





Objective





- Vital Signs/Intake and Output


Vital Signs (last 24 hours): 


 











Temp Pulse Resp BP Pulse Ox


 


 98.5 F   73   20   170/96 H  97 


 


 07/07/17 07:30  07/07/17 09:22  07/07/17 07:30  07/07/17 09:22  07/07/17 07:30








Intake and Output: 


 











 07/07/17 07/07/17





 06:59 18:59


 


Intake Total 960 


 


Output Total 1250 


 


Balance -290 














- Medications


Medications: 


 Current Medications





Atorvastatin Calcium (Lipitor)  10 mg PO DIN [facility]


   Last Admin: 07/06/17 17:07 Dose:  10 mg


Diltiazem HCl (Cardizem Cd)  120 mg PO DAILY [facility]


   Last Admin: 07/07/17 09:22 Dose:  120 mg


Furosemide (Lasix)  20 mg PO DAILY [facility]


   Last Admin: 07/07/17 09:21 Dose:  20 mg


Gabapentin (Neurontin)  400 mg PO TID [facility]


   PRN Reason: Protocol


   Last Admin: 07/07/17 09:26 Dose:  400 mg


Glipizide (Glucotrol)  10 mg PO ACBD [facility]


   Last Admin: 07/07/17 08:19 Dose:  10 mg


Cefepime HCl (Maxipime 1gm)  1 gm in 100 mls @ 100 mls/hr IVPB Q8 [facility]


   PRN Reason: Protocol


   Last Admin: 07/07/17 05:29 Dose:  100 mls/hr


Vancomycin HCl (Vancomycin 1gm)  1 gm in 250 mls @ 167 mls/hr IVPB Q12H [facility]


   PRN Reason: Protocol


   Last Admin: 07/07/17 05:29 Dose:  167 mls/hr


Insulin Human Regular (Humulin R High)  0 units SC ACHS [facility]


   PRN Reason: Protocol


   Last Admin: 07/07/17 08:18 Dose:  4 units


Metformin HCl (Glucophage)  1,000 mg PO ACBD [facility]


   Last Admin: 07/07/17 08:19 Dose:  1,000 mg


Potassium Chloride (K-Dur 20 Meq Er Tab)  20 meq PO DAILY [facility]


   Last Admin: 07/07/17 09:21 Dose:  20 meq


Ramipril (Altace)  10 mg PO DAILY [facility]


   Last Admin: 07/07/17 09:22 Dose:  10 mg


Warfarin Sodium (Coumadin)  10 mg PO DAILY [facility]


   PRN Reason: Protocol


   Last Admin: 07/07/17 09:25 Dose:  10 mg


Warfarin Sodium (Coumadin)  2 mg PO DAILY [facility]


   PRN Reason: Protocol


   Last Admin: 07/07/17 09:26 Dose:  2 mg











- Labs


Labs: 


 











PT  10.9 Seconds (9.9-11.8)   07/06/17  06:45    


 


INR  1.01  (0.93-1.08)   07/06/17  06:45    














Attending/Attestation





- Attestation


I have personally seen and examined this patient.: Yes


I have fully participated in the care of the patient.: Yes


I have reviewed all pertinent clinical information, including history, physical 

exam and plan: Yes

## 2017-07-07 NOTE — CP.PCM.CON
History of Present Illness





- History of Present Illness


History of Present Illness: 





57M PMHx of MRSA and leg infections presented to the ED for bilateral lower 

extremity cellulitis. Pt states continued change in color of his legs. Denies 

pain, fever, chest pain, SOB, abd pain, N/V. Pt is poor historian.





PMHx: MRSA cellulitis


PSHx: Hernia repair


Meds: per Med Rec





Review of Systems





- Constitutional


Constitutional: As Per HPI.  absent: Headache





- Cardiovascular


Cardiovascular: Leg Edema, Leg Ulcers.  absent: Chest Pain





- Respiratory


Respiratory: absent: Cough, Dyspnea





- Gastrointestinal


Gastrointestinal: absent: Abdominal Pain, Change in Bowel Habits





- Musculoskeletal


Musculoskeletal: Abnormal Gait





Past Patient History





- Infectious Disease


Hx of Infectious Diseases: None, MRSA





- Tetanus Immunizations


Tetanus Immunization: Unknown





- Past Medical History & Family History


Past Medical History?: Yes





- Past Social History


Smoking Status: Former Smoker





- CARDIAC


Hx Cardiac Disorders: Yes


Hx Congestive Heart Failure: Yes


Hx Hypertension: Yes





- PULMONARY


Hx Chronic Obstructive Pulmonary Disease (COPD): No





- NEUROLOGICAL


Other/Comment: Epilepsy





- HEENT


Hx HEENT Problems: Yes


Other/Comment: wears glasses





- RENAL


Hx Renal Failure: No





- ENDOCRINE/METABOLIC


Hx Diabetes Mellitus Type 2: Yes





- HEMATOLOGICAL/ONCOLOGICAL


Hx Blood Disorders: No


Hx AIDS: No


Hx Anemia: No


Hx Cancer: No


Hx Chemotherapy: No


Hx Cirrhosis: No


Hx Hepatitis A: No


Hx Hepatitis B: No


Hx Hepatitis C: No


Hx Metastesis: No


Hx Shingles: No


Hx Unexplained Bleeding: No


Other/Comment: blood infection





- INTEGUMENTARY


Hx Dermatological Problems: Yes


Hx Basil Cell: No


Hx Eczema: No


Hx Melanoma: No


Hx Psoriasis: No


Hx Squamous Cell: No


Other/Comment: multiple wounds on the rt leg, cellulitis in b/l extreminity. 

Left thigh cellulitis





- MUSCULOSKELETAL/RHEUMATOLOGICAL


Hx Falls: Yes





- GASTROINTESTINAL


Hx Gastrointestinal Disorders: No


Hx Colostomy: No


Hx Crohn's Disease: No


Hx Diverticulitis: No


Hx Gall Bladder Disease: No


Hx Gastroesophageal Reflux: No


Hx Ileostomy: No


Hx Liver Failure: No


Hx Pancreatitis: No


HX Swallowing Problems: No





- GENITOURINARY/GYNECOLOGICAL


Hx Genitourinary Disorders: No


Hx Hematuria: No


Hx Incontinence: No


Hx Prostate Problems: No


Hx Sexually Transmitted Disorders: No


Hx Urinary Tract Infection: No





- PSYCHIATRIC


Hx Psychophysiologic Disorder: Yes


Hx Substance Use: No


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





- SURGICAL HISTORY


Hx Amputation: No


Hx Appendectomy: No


Hx Cholecystectomy: No


Hx Gastric Bypass Surgery: No


Hx Hysterectomy: No


Hx Joint Replacement: No


Hx Kidney Transplant: No


Hx Liver Transplant: No


Hx Mastectomy: No


Hx Musculoskeletal Surgery: No


Hx Open Heart Surgery: No


Hx Orthopedic Surgery: No


Hx Splenectomy: No


Hx Valve Replacement: No





- ANESTHESIA


Hx Anesthesia: Yes


Hx Anesthesia Reactions: No


Hx Malignant Hyperthermia: No





Meds


Allergies/Adverse Reactions: 


 Allergies











Allergy/AdvReac Type Severity Reaction Status Date / Time


 


No Known Allergies Allergy   Verified 07/05/17 13:00














- Medications


Medications: 


 Current Medications





Atorvastatin Calcium (Lipitor)  10 mg PO DIN [facility]


   Last Admin: 07/06/17 17:07 Dose:  10 mg


Diltiazem HCl (Cardizem Cd)  120 mg PO DAILY [facility]


   Last Admin: 07/07/17 09:22 Dose:  120 mg


Furosemide (Lasix)  20 mg PO DAILY [facility]


   Last Admin: 07/07/17 09:21 Dose:  20 mg


Gabapentin (Neurontin)  400 mg PO TID [facility]


   PRN Reason: Protocol


   Last Admin: 07/07/17 09:26 Dose:  400 mg


Glipizide (Glucotrol)  10 mg PO ACBD [facility]


   Last Admin: 07/07/17 08:19 Dose:  10 mg


Cefepime HCl (Maxipime 1gm)  1 gm in 100 mls @ 100 mls/hr IVPB Q8 [facility]


   PRN Reason: Protocol


   Last Admin: 07/07/17 05:29 Dose:  100 mls/hr


Vancomycin HCl (Vancomycin 1gm)  1 gm in 250 mls @ 167 mls/hr IVPB Q12H [facility]


   PRN Reason: Protocol


   Last Admin: 07/07/17 05:29 Dose:  167 mls/hr


Insulin Human Regular (Humulin R High)  0 units SC ACHS [facility]


   PRN Reason: Protocol


   Last Admin: 07/07/17 08:18 Dose:  4 units


Metformin HCl (Glucophage)  1,000 mg PO ACBD [facility]


   Last Admin: 07/07/17 08:19 Dose:  1,000 mg


Potassium Chloride (K-Dur 20 Meq Er Tab)  20 meq PO DAILY [facility]


   Last Admin: 07/07/17 09:21 Dose:  20 meq


Ramipril (Altace)  10 mg PO DAILY [facility]


   Last Admin: 07/07/17 09:22 Dose:  10 mg


Warfarin Sodium (Coumadin)  10 mg PO DAILY [facility]


   PRN Reason: Protocol


   Last Admin: 07/07/17 09:25 Dose:  10 mg


Warfarin Sodium (Coumadin)  2 mg PO DAILY [facility]


   PRN Reason: Protocol


   Last Admin: 07/07/17 09:26 Dose:  2 mg











Physical Exam





- Constitutional


Appears: No Acute Distress


Additional comments: 





Only responds to questions as yes and no





- Head Exam


Head Exam: ATRAUMATIC





- Eye Exam


Eye Exam: Normal appearance, PERRL





- Respiratory Exam


Respiratory Exam: Clear to Auscultation Bilateral, NORMAL BREATHING PATTERN.  

absent: Accessory Muscle Use





- Cardiovascular Exam


Cardiovascular Exam: REGULAR RHYTHM, +S1, +S2





- GI/Abdominal Exam


GI & Abdominal Exam: Normal Bowel Sounds, Soft.  absent: Firm, Organomegaly, 

Pulsatile Mass, Tenderness


Additional comments: 





old scar 5cm below umbilicus





- Extremities Exam


Extremities exam: Positive for: joint swelling


Additional comments: 





Skin erosion bilateral. dark, non-erythema left leg. Right leg in bandage 

during time of examination. Removed bandage and re-dressed. Right leg stage 2 

ulcer. 





- Skin


Additional comments: 





Right leg ulcer measuring below the tibial plateau to 4cm above the malleolus. 





Results





- Vital Signs


Recent Vital Signs: 


 Last Vital Signs











Temp  98.5 F   07/07/17 07:30


 


Pulse  73   07/07/17 09:22


 


Resp  20   07/07/17 07:30


 


BP  170/96 H  07/07/17 09:22


 


Pulse Ox  97   07/07/17 07:30














- Labs


Result Diagrams: 


 07/05/17 11:15





 07/05/17 11:15


Labs: 


 Laboratory Results - last 24 hr











  07/05/17





  13:08


 


POC Glucose (mg/dL)  349 H














Assessment & Plan





- Assessment and Plan (Free Text)


Assessment: 





57M with hx of leg ulcers and MRSA


Plan: 





- continue with dressing changes


- follow ID recs


- wound care per podiatry


- no surgical intervention at this time








will discuss with Dr. Rolando Hargrove PGY1

## 2017-07-07 NOTE — CP.PCM.PN
Subjective





- Date & Time of Evaluation


Date of Evaluation: 07/07/17


Time of Evaluation: 10:10





- Subjective


Subjective: 





Comfortable on a chair, not in distress, afebrile.





Objective





- Vital Signs/Intake and Output


Vital Signs (last 24 hours): 


 











Temp Pulse Resp BP Pulse Ox


 


 98.5 F   73   20   170/96 H  97 


 


 07/07/17 07:30  07/07/17 07:30  07/07/17 07:30  07/07/17 07:30  07/07/17 07:30








Intake and Output: 


 











 07/07/17 07/07/17





 06:59 18:59


 


Intake Total 960 


 


Output Total 1250 


 


Balance -290 














- Medications


Medications: 


 Current Medications





Atorvastatin Calcium (Lipitor)  10 mg PO DIN [facility]


   Last Admin: 07/06/17 17:07 Dose:  10 mg


Diltiazem HCl (Cardizem Cd)  120 mg PO DAILY [facility]


   Last Admin: 07/06/17 09:41 Dose:  120 mg


Furosemide (Lasix)  20 mg PO DAILY [facility]


   Last Admin: 07/06/17 09:41 Dose:  20 mg


Gabapentin (Neurontin)  400 mg PO TID [facility]


   PRN Reason: Protocol


   Last Admin: 07/06/17 17:07 Dose:  400 mg


Glipizide (Glucotrol)  10 mg PO ACBD [facility]


   Last Admin: 07/06/17 17:07 Dose:  10 mg


Cefepime HCl (Maxipime 1gm)  1 gm in 100 mls @ 100 mls/hr IVPB Q8 [facility]


   PRN Reason: Protocol


   Last Admin: 07/07/17 05:29 Dose:  100 mls/hr


Vancomycin HCl (Vancomycin 1gm)  1 gm in 250 mls @ 167 mls/hr IVPB Q12H [facility]


   PRN Reason: Protocol


   Last Admin: 07/07/17 05:29 Dose:  167 mls/hr


Insulin Human Regular (Humulin R High)  0 units SC ACHS [facility]


   PRN Reason: Protocol


   Last Admin: 07/06/17 17:07 Dose:  4 units


Metformin HCl (Glucophage)  1,000 mg PO ACBD [facility]


   Last Admin: 07/06/17 17:07 Dose:  1,000 mg


Potassium Chloride (K-Dur 20 Meq Er Tab)  20 meq PO DAILY [facility]


   Last Admin: 07/06/17 09:41 Dose:  20 meq


Ramipril (Altace)  10 mg PO DAILY [facility]


   Last Admin: 07/06/17 09:40 Dose:  10 mg


Warfarin Sodium (Coumadin)  10 mg PO DAILY [facility]


   PRN Reason: Protocol


   Last Admin: 07/06/17 09:41 Dose:  10 mg


Warfarin Sodium (Coumadin)  2 mg PO DAILY [facility]


   PRN Reason: Protocol


   Last Admin: 07/06/17 09:41 Dose:  2 mg











- Labs


Labs: 


 











PT  10.9 Seconds (9.9-11.8)   07/06/17  06:45    


 


INR  1.01  (0.93-1.08)   07/06/17  06:45    














- Constitutional


Appears: Non-toxic, No Acute Distress





- Head Exam


Head Exam: NORMAL INSPECTION





- Neck Exam


Neck Exam: absent: Meningismus





- Respiratory Exam


Respiratory Exam: Decreased Breath Sounds





- Cardiovascular Exam


Cardiovascular Exam: +S1, +S2





- GI/Abdominal Exam


GI & Abdominal Exam: Soft.  absent: Tenderness





- Extremities Exam


Additional comments: 





right leg with dry dressings in place





Assessment and Plan





- Assessment and Plan (Free Text)


Plan: 





Assessment


Probable right leg skin and skin structure infection associated with chronic 

leg ulcers


history of sepsis secondary to right leg skin/skin structure infection with 

chronic leg ulcers, growing MRSA and sensitive Klebsiella 


history of MRSA cellulitis Sept. 2016


history of Bilateral lower extremity skin and skin structure infection (

Enterobacter, Klebsiella Oxytoca and Group B Strep, as well as MRSA) which was 

treated 9/2015; MRSA and Pseudomonas cellulitis of left leg in Dec 2015, May 

and June 2016


chronic lymphedema of the lower extremities


Morbid obesity with BMI 55


HTN


DM


history of Strep bacteremia


history of hernia surgery


Learning disability





Plan


continue Vancomycin and cefepime day 2 pending final blood and wound cx; follow 

up Podiatry evaluation and plans


will continue to monitor clinically

## 2017-07-08 ENCOUNTER — HOSPITAL ENCOUNTER (INPATIENT)
Dept: HOSPITAL 42 - TRCU | Age: 57
LOS: 9 days | Discharge: HOME HEALTH | DRG: 603 | End: 2017-07-17
Attending: INTERNAL MEDICINE | Admitting: INTERNAL MEDICINE
Payer: COMMERCIAL

## 2017-07-08 VITALS — HEART RATE: 65 BPM | TEMPERATURE: 98.2 F

## 2017-07-08 VITALS — DIASTOLIC BLOOD PRESSURE: 88 MMHG | SYSTOLIC BLOOD PRESSURE: 178 MMHG

## 2017-07-08 VITALS — BODY MASS INDEX: 52.2 KG/M2

## 2017-07-08 DIAGNOSIS — R79.1: ICD-10-CM

## 2017-07-08 DIAGNOSIS — Z87.891: ICD-10-CM

## 2017-07-08 DIAGNOSIS — I50.9: ICD-10-CM

## 2017-07-08 DIAGNOSIS — I48.91: ICD-10-CM

## 2017-07-08 DIAGNOSIS — E11.622: ICD-10-CM

## 2017-07-08 DIAGNOSIS — E78.5: ICD-10-CM

## 2017-07-08 DIAGNOSIS — Z79.84: ICD-10-CM

## 2017-07-08 DIAGNOSIS — L30.9: ICD-10-CM

## 2017-07-08 DIAGNOSIS — F79: ICD-10-CM

## 2017-07-08 DIAGNOSIS — F81.9: ICD-10-CM

## 2017-07-08 DIAGNOSIS — E66.01: ICD-10-CM

## 2017-07-08 DIAGNOSIS — Z86.14: ICD-10-CM

## 2017-07-08 DIAGNOSIS — I11.0: ICD-10-CM

## 2017-07-08 DIAGNOSIS — L03.115: Primary | ICD-10-CM

## 2017-07-08 DIAGNOSIS — L97.919: ICD-10-CM

## 2017-07-08 DIAGNOSIS — L81.9: ICD-10-CM

## 2017-07-08 DIAGNOSIS — B95.62: ICD-10-CM

## 2017-07-08 DIAGNOSIS — L85.3: ICD-10-CM

## 2017-07-08 DIAGNOSIS — G40.909: ICD-10-CM

## 2017-07-08 DIAGNOSIS — I89.0: ICD-10-CM

## 2017-07-08 RX ADMIN — VANCOMYCIN HYDROCHLORIDE SCH MLS/HR: 1 INJECTION, POWDER, LYOPHILIZED, FOR SOLUTION INTRAVENOUS at 04:53

## 2017-07-08 RX ADMIN — INSULIN HUMAN SCH UNITS: 100 INJECTION, SOLUTION PARENTERAL at 17:40

## 2017-07-08 RX ADMIN — DILTIAZEM HYDROCHLORIDE SCH: 120 CAPSULE, COATED, EXTENDED RELEASE ORAL at 09:29

## 2017-07-08 RX ADMIN — CEFEPIME SCH MLS/HR: 1 INJECTION, SOLUTION INTRAVENOUS at 21:36

## 2017-07-08 RX ADMIN — POTASSIUM CHLORIDE SCH MEQ: 20 TABLET, EXTENDED RELEASE ORAL at 09:24

## 2017-07-08 RX ADMIN — INSULIN HUMAN SCH UNITS: 100 INJECTION, SOLUTION PARENTERAL at 07:49

## 2017-07-08 RX ADMIN — CEFEPIME SCH MLS/HR: 1 INJECTION, SOLUTION INTRAVENOUS at 15:18

## 2017-07-08 RX ADMIN — VANCOMYCIN HYDROCHLORIDE SCH MLS/HR: 1 INJECTION, POWDER, LYOPHILIZED, FOR SOLUTION INTRAVENOUS at 17:28

## 2017-07-08 RX ADMIN — DILTIAZEM HYDROCHLORIDE SCH MG: 120 CAPSULE, COATED, EXTENDED RELEASE ORAL at 06:54

## 2017-07-08 RX ADMIN — INSULIN HUMAN SCH: 100 INJECTION, SOLUTION PARENTERAL at 21:49

## 2017-07-08 RX ADMIN — CEFEPIME SCH MLS/HR: 1 INJECTION, SOLUTION INTRAVENOUS at 06:32

## 2017-07-08 NOTE — CP.PCM.PN
Subjective





- Date & Time of Evaluation


Date of Evaluation: 07/08/17


Time of Evaluation: 11:05





- Subjective


Subjective: 





Comfortable on a chair, not in distress, afebrile.





Objective





- Vital Signs/Intake and Output


Vital Signs (last 24 hours): 


 











Temp Pulse Resp BP Pulse Ox


 


 98.2 F   65   20   178/88 H  94 L


 


 07/08/17 08:00  07/08/17 08:00  07/08/17 08:00  07/08/17 09:24  07/08/17 08:00








Intake and Output: 


 











 07/08/17 07/08/17





 06:59 18:59


 


Intake Total 1020 


 


Output Total 1125 


 


Balance -105 














- Medications


Medications: 


 Current Medications





Atorvastatin Calcium (Lipitor)  10 mg PO DIN [facility]


   Last Admin: 07/07/17 18:07 Dose:  10 mg


Diltiazem HCl (Cardizem Cd)  120 mg PO DAILY [facility]


   Last Admin: 07/08/17 09:29 Dose:  Not Given


Furosemide (Lasix)  20 mg PO DAILY [facility]


   Last Admin: 07/08/17 09:24 Dose:  20 mg


Gabapentin (Neurontin)  400 mg PO TID [facility]


   PRN Reason: Protocol


   Last Admin: 07/08/17 09:24 Dose:  400 mg


Glipizide (Glucotrol)  10 mg PO ACBD [facility]


   Last Admin: 07/08/17 07:49 Dose:  10 mg


Cefepime HCl (Maxipime 1gm)  1 gm in 100 mls @ 100 mls/hr IVPB Q8 [facility]


   PRN Reason: Protocol


   Last Admin: 07/08/17 06:32 Dose:  100 mls/hr


Vancomycin HCl (Vancomycin 1gm)  1 gm in 250 mls @ 167 mls/hr IVPB Q12H [facility]


   PRN Reason: Protocol


   Last Admin: 07/08/17 04:53 Dose:  167 mls/hr


Insulin Human Regular (Humulin R High)  0 units SC ACHS [facility]


   PRN Reason: Protocol


   Last Admin: 07/08/17 07:49 Dose:  7 units


Metformin HCl (Glucophage)  1,000 mg PO ACBD [facility]


   Last Admin: 07/08/17 07:49 Dose:  1,000 mg


Potassium Chloride (K-Dur 20 Meq Er Tab)  20 meq PO DAILY [facility]


   Last Admin: 07/08/17 09:24 Dose:  20 meq


Ramipril (Altace)  10 mg PO DAILY [facility]


   Last Admin: 07/08/17 09:24 Dose:  10 mg


Warfarin Sodium (Coumadin)  10 mg PO DAILY [facility]


   PRN Reason: Protocol


   Last Admin: 07/08/17 09:24 Dose:  10 mg


Warfarin Sodium (Coumadin)  2 mg PO DAILY [facility]


   PRN Reason: Protocol


   Last Admin: 07/08/17 09:24 Dose:  2 mg











- Labs


Labs: 


 











PT  10.9 Seconds (9.9-11.8)   07/06/17  06:45    


 


INR  1.01  (0.93-1.08)   07/06/17  06:45    














- Constitutional


Appears: Non-toxic, No Acute Distress





- Head Exam


Head Exam: NORMAL INSPECTION





- ENT Exam


ENT Exam: Mucous Membranes Moist





- Neck Exam


Neck Exam: absent: Lymphadenopathy, Meningismus





- Respiratory Exam


Respiratory Exam: Decreased Breath Sounds





- Cardiovascular Exam


Cardiovascular Exam: +S1, +S2





- GI/Abdominal Exam


GI & Abdominal Exam: Soft.  absent: Tenderness





- Extremities Exam


Additional comments: 





right leg with dry dressings in place





Assessment and Plan





- Assessment and Plan (Free Text)


Plan: 





Assessment


Probable right leg skin and skin structure infection associated with chronic 

leg ulcers


history of sepsis secondary to right leg skin/skin structure infection with 

chronic leg ulcers, growing MRSA and sensitive Klebsiella 


history of MRSA cellulitis Sept. 2016


history of Bilateral lower extremity skin and skin structure infection (

Enterobacter, Klebsiella Oxytoca and Group B Strep, as well as MRSA) which was 

treated 9/2015; MRSA and Pseudomonas cellulitis of left leg in Dec 2015, May 

and June 2016


chronic lymphedema of the lower extremities


Morbid obesity with BMI 55


HTN


DM


history of Strep bacteremia


history of hernia surgery


Learning disability





Plan


continue Vancomycin and cefepime day 3 pending final blood and wound cx (

preliminarily showing Staph aureus); reviewed Podiatry evaluation and plans


will continue to monitor clinically

## 2017-07-08 NOTE — CP.PCM.PN
Subjective





- Date & Time of Evaluation


Date of Evaluation: 07/08/17


Time of Evaluation: 09:00





- Subjective


Subjective: 





Diabetic male seen for cellulitis and infection with open wounds to the RLE - 

pt with elephantitis of the legs which we have been trying to control with 

compression dressings; unfortunately his insurance will not pay for Farrow 

wraps or lymphedema machine  Pt seen with legs elevated in no distress





Objective





- Vital Signs/Intake and Output


Vital Signs (last 24 hours): 


 











Temp Pulse Resp BP Pulse Ox


 


 98.2 F   65   20   187/90 H  94 L


 


 07/08/17 08:00  07/08/17 08:00  07/08/17 08:00  07/08/17 08:00  07/08/17 08:00








Intake and Output: 


 











 07/08/17 07/08/17





 06:59 18:59


 


Intake Total 1020 


 


Output Total 1125 


 


Balance -105 














- Medications


Medications: 


 Current Medications





Atorvastatin Calcium (Lipitor)  10 mg PO DIN [facility]


   Last Admin: 07/07/17 18:07 Dose:  10 mg


Diltiazem HCl (Cardizem Cd)  120 mg PO DAILY [facility]


   Last Admin: 07/08/17 06:54 Dose:  120 mg


Furosemide (Lasix)  20 mg PO DAILY [facility]


   Last Admin: 07/07/17 09:21 Dose:  20 mg


Gabapentin (Neurontin)  400 mg PO TID [facility]


   PRN Reason: Protocol


   Last Admin: 07/07/17 18:07 Dose:  400 mg


Glipizide (Glucotrol)  10 mg PO ACBD [facility]


   Last Admin: 07/08/17 07:49 Dose:  10 mg


Cefepime HCl (Maxipime 1gm)  1 gm in 100 mls @ 100 mls/hr IVPB Q8 [facility]


   PRN Reason: Protocol


   Last Admin: 07/08/17 06:32 Dose:  100 mls/hr


Vancomycin HCl (Vancomycin 1gm)  1 gm in 250 mls @ 167 mls/hr IVPB Q12H [facility]


   PRN Reason: Protocol


   Last Admin: 07/08/17 04:53 Dose:  167 mls/hr


Insulin Human Regular (Humulin R High)  0 units SC ACHS [facility]


   PRN Reason: Protocol


   Last Admin: 07/08/17 07:49 Dose:  7 units


Metformin HCl (Glucophage)  1,000 mg PO ACBD [facility]


   Last Admin: 07/08/17 07:49 Dose:  1,000 mg


Potassium Chloride (K-Dur 20 Meq Er Tab)  20 meq PO DAILY [facility]


   Last Admin: 07/07/17 09:21 Dose:  20 meq


Ramipril (Altace)  10 mg PO DAILY [facility]


   Last Admin: 07/07/17 09:22 Dose:  10 mg


Warfarin Sodium (Coumadin)  10 mg PO DAILY [facility]


   PRN Reason: Protocol


   Last Admin: 07/07/17 09:25 Dose:  10 mg


Warfarin Sodium (Coumadin)  2 mg PO DAILY [facility]


   PRN Reason: Protocol


   Last Admin: 07/07/17 09:26 Dose:  2 mg











- Labs


Labs: 


 











PT  10.9 Seconds (9.9-11.8)   07/06/17  06:45    


 


INR  1.01  (0.93-1.08)   07/06/17  06:45    














- Constitutional


Appears: Well, No Acute Distress





- Extremities Exam


Extremities Exam: Normal Capillary Refill, Pedal Edema.  absent: Calf Tenderness

, Joint Swelling, Tenderness


Additional comments: 





multiple open wounds on the right leg - cellulitis is resolving - no calor - 

maceration of the tissue noted; multiple small open wounds no sinus tract no 

undermining no fluctuance no malodor noted - chronic skin changes of chronic 

edema noted





Assessment and Plan





- Assessment and Plan (Free Text)


Assessment: 





Diabetes with chronic lower extremity edema - RLE cellulits and ulcerationbs - 

improving


Plan: 





continue local care


continue leg elevation


continue IV amtibiotics


C&S reviewed


pt toTCU 


]dressing change done


will follow

## 2017-07-08 NOTE — CP.PCM.PN
Subjective





- Date & Time of Evaluation


Date of Evaluation: 07/08/17


Time of Evaluation: 08:15





- Subjective


Subjective: 





Pt with no pain. Eating well. 





 





Objective





- Vital Signs/Intake and Output


Vital Signs (last 24 hours): 


 











Temp Pulse Resp BP Pulse Ox


 


 98.2 F   65   20   187/90 H  94 L


 


 07/08/17 08:00  07/08/17 08:00  07/08/17 08:00  07/08/17 08:00  07/08/17 08:00








Intake and Output: 


 











 07/08/17 07/08/17





 06:59 18:59


 


Intake Total 1020 


 


Output Total 1125 


 


Balance -105 














- Medications


Medications: 


 Current Medications





Atorvastatin Calcium (Lipitor)  10 mg PO DIN [facility]


   Last Admin: 07/07/17 18:07 Dose:  10 mg


Diltiazem HCl (Cardizem Cd)  120 mg PO DAILY [facility]


   Last Admin: 07/08/17 06:54 Dose:  120 mg


Furosemide (Lasix)  20 mg PO DAILY [facility]


   Last Admin: 07/07/17 09:21 Dose:  20 mg


Gabapentin (Neurontin)  400 mg PO TID [facility]


   PRN Reason: Protocol


   Last Admin: 07/07/17 18:07 Dose:  400 mg


Glipizide (Glucotrol)  10 mg PO ACBD [facility]


   Last Admin: 07/08/17 07:49 Dose:  10 mg


Cefepime HCl (Maxipime 1gm)  1 gm in 100 mls @ 100 mls/hr IVPB Q8 [facility]


   PRN Reason: Protocol


   Last Admin: 07/08/17 06:32 Dose:  100 mls/hr


Vancomycin HCl (Vancomycin 1gm)  1 gm in 250 mls @ 167 mls/hr IVPB Q12H [facility]


   PRN Reason: Protocol


   Last Admin: 07/08/17 04:53 Dose:  167 mls/hr


Insulin Human Regular (Humulin R High)  0 units SC ACHS [facility]


   PRN Reason: Protocol


   Last Admin: 07/08/17 07:49 Dose:  7 units


Metformin HCl (Glucophage)  1,000 mg PO ACBD [facility]


   Last Admin: 07/08/17 07:49 Dose:  1,000 mg


Potassium Chloride (K-Dur 20 Meq Er Tab)  20 meq PO DAILY [facility]


   Last Admin: 07/07/17 09:21 Dose:  20 meq


Ramipril (Altace)  10 mg PO DAILY [facility]


   Last Admin: 07/07/17 09:22 Dose:  10 mg


Warfarin Sodium (Coumadin)  10 mg PO DAILY [facility]


   PRN Reason: Protocol


   Last Admin: 07/07/17 09:25 Dose:  10 mg


Warfarin Sodium (Coumadin)  2 mg PO DAILY [facility]


   PRN Reason: Protocol


   Last Admin: 07/07/17 09:26 Dose:  2 mg











- Labs


Labs: 


 











PT  10.9 Seconds (9.9-11.8)   07/06/17  06:45    


 


INR  1.01  (0.93-1.08)   07/06/17  06:45    














- Constitutional


Appears: Well





- Head Exam


Head Exam: NORMAL INSPECTION





- Eye Exam


Eye Exam: EOMI, Normal appearance, PERRL





- ENT Exam


ENT Exam: Mucous Membranes Moist, Normal Exam





- Neck Exam


Neck Exam: Full ROM, Normal Inspection.  absent: Lymphadenopathy





- Respiratory Exam


Respiratory Exam: Clear to Ausculation Bilateral, NORMAL BREATHING PATTERN.  

absent: Rales, Rhonchi





- Cardiovascular Exam


Cardiovascular Exam: REGULAR RHYTHM, +S1, +S2.  absent: Murmur





- GI/Abdominal Exam


GI & Abdominal Exam: Soft, Normal Bowel Sounds.  absent: Tenderness, Hernia





- Extremities Exam


Extremities Exam: Full ROM, Normal Capillary Refill (chronic skin changes).  

absent: Joint Swelling, Pedal Edema





Assessment and Plan





- Assessment and Plan (Free Text)


Plan: 








IV Abx- Vanco. Dr Ramirez from Podiatry and by ID, and Dr Marshall following. On 

Insulin. He will be given Coumadin for his A fib. On ISS for DM-2. FS with 

coverage. On Neurontin for neuropathy. On Cardiezem for A fib. Lipitor for 

Dyslipidemia. TCU pending.

## 2017-07-09 PROCEDURE — F07M6ZZ THERAPEUTIC EXERCISE TREATMENT OF MUSCULOSKELETAL SYSTEM - WHOLE BODY: ICD-10-PCS | Performed by: INTERNAL MEDICINE

## 2017-07-09 PROCEDURE — F07Z9FZ GAIT TRAINING/FUNCTIONAL AMBULATION TREATMENT USING ASSISTIVE, ADAPTIVE, SUPPORTIVE OR PROTECTIVE EQUIPMENT: ICD-10-PCS | Performed by: INTERNAL MEDICINE

## 2017-07-09 RX ADMIN — VANCOMYCIN HYDROCHLORIDE SCH MLS/HR: 1 INJECTION, POWDER, LYOPHILIZED, FOR SOLUTION INTRAVENOUS at 05:18

## 2017-07-09 RX ADMIN — INSULIN HUMAN SCH UNITS: 100 INJECTION, SOLUTION PARENTERAL at 12:36

## 2017-07-09 RX ADMIN — INSULIN HUMAN SCH UNITS: 100 INJECTION, SOLUTION PARENTERAL at 06:33

## 2017-07-09 RX ADMIN — CEFEPIME SCH MLS/HR: 1 INJECTION, SOLUTION INTRAVENOUS at 05:18

## 2017-07-09 RX ADMIN — INSULIN HUMAN SCH: 100 INJECTION, SOLUTION PARENTERAL at 22:16

## 2017-07-09 RX ADMIN — DILTIAZEM HYDROCHLORIDE SCH MG: 120 CAPSULE, COATED, EXTENDED RELEASE ORAL at 10:21

## 2017-07-09 RX ADMIN — INSULIN HUMAN SCH: 100 INJECTION, SOLUTION PARENTERAL at 16:48

## 2017-07-09 RX ADMIN — POTASSIUM CHLORIDE SCH MEQ: 20 TABLET, EXTENDED RELEASE ORAL at 08:03

## 2017-07-09 NOTE — CP.PCM.CON
History of Present Illness





- History of Present Illness


History of Present Illness: 


57 year old male with PMH of right leg skin/skin structure infection with 

chronic leg ulcers, growing MRSA and sensitive Klebsiella in 2016, Bilateral 

lower extremity skin and skin structure infection (Enterobacter, Klebsiella 

Oxytoca and Group B Strep, as well as MRSA) which was treated 9/2015; MRSA and 

Pseudomonas cellulitis of left leg in Dec 2015, May and June 2016, and MRSA 

cellulitis in Sept. 2016, chronic lymphedema of the lower extremities, Morbid 

obesity with BMI 53, HTN, DM, history of Strep bacteremia, history of hernia 

surgery, Learning disability was initially brought in to [facility] 

because of lower extremity edema with weeping of the ulcers on the right leg. 

He has been on antibiotics and wound care and has been doing well. He is now 

transferred to [facility] for continued medical therapy and physical rehab. 

Infectious Diseases consult is requested to continue his antibiotic therapy.





Review of Systems





- Review of Systems


Systems not reviewed;Unavailable: Other (learning disability)





Past Patient History





- Infectious Disease


Hx of Infectious Diseases: None, MRSA





- Tetanus Immunizations


Tetanus Immunization: Unknown





- Past Medical History & Family History


Past Medical History?: Yes





- Past Social History


Smoking Status: Former Smoker





- CARDIAC


Hx Cardiac Disorders: Yes


Hx Congestive Heart Failure: Yes


Hx Hypertension: Yes





- PULMONARY


Hx Chronic Obstructive Pulmonary Disease (COPD): No





- NEUROLOGICAL


Other/Comment: Epilepsy





- HEENT


Hx HEENT Problems: Yes


Other/Comment: wears glasses





- RENAL


Hx Renal Failure: No





- ENDOCRINE/METABOLIC


Hx Diabetes Mellitus Type 2: Yes





- HEMATOLOGICAL/ONCOLOGICAL


Hx Blood Disorders: No


Hx AIDS: No


Hx Anemia: No


Hx Cancer: No


Hx Chemotherapy: No


Hx Cirrhosis: No


Hx Hepatitis A: No


Hx Hepatitis B: No


Hx Hepatitis C: No


Hx Metastesis: No


Hx Shingles: No


Hx Unexplained Bleeding: No


Other/Comment: blood infection





- INTEGUMENTARY


Hx Dermatological Problems: Yes


Hx Basil Cell: No


Hx Eczema: No


Hx Melanoma: No


Hx Psoriasis: No


Hx Squamous Cell: No


Other/Comment: multiple wounds on the rt leg, cellulitis in b/l extreminity. 

Left thigh cellulitis





- MUSCULOSKELETAL/RHEUMATOLOGICAL


Hx Falls: Yes





- GASTROINTESTINAL


Hx Gastrointestinal Disorders: No


Hx Colostomy: No


Hx Crohn's Disease: No


Hx Diverticulitis: No


Hx Gall Bladder Disease: No


Hx Gastroesophageal Reflux: No


Hx Ileostomy: No


Hx Liver Failure: No


Hx Pancreatitis: No


HX Swallowing Problems: No





- GENITOURINARY/GYNECOLOGICAL


Hx Reproductive Disorders: No





- PSYCHIATRIC


Hx Psychophysiologic Disorder: Yes


Hx Substance Use: No


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





- SURGICAL HISTORY


Hx Amputation: No


Hx Appendectomy: No


Hx Cholecystectomy: No


Hx Gastric Bypass Surgery: No


Hx Hysterectomy: No


Hx Joint Replacement: No


Hx Kidney Transplant: No


Hx Liver Transplant: No


Hx Mastectomy: No


Hx Musculoskeletal Surgery: No


Hx Open Heart Surgery: No


Hx Orthopedic Surgery: No


Hx Splenectomy: No


Hx Valve Replacement: No





- ANESTHESIA


Hx Anesthesia: Yes


Hx Anesthesia Reactions: No


Hx Malignant Hyperthermia: No





Meds


Allergies/Adverse Reactions: 


 Allergies











Allergy/AdvReac Type Severity Reaction Status Date / Time


 


No Known Allergies Allergy   Verified 07/05/17 13:00














- Medications


Medications: 


 Current Medications





Atorvastatin Calcium (Lipitor)  10 mg PO DIN [facility]


   PRN Reason: Protocol


   Last Admin: 07/08/17 17:27 Dose:  10 mg


Diltiazem HCl (Cardizem Cd)  120 mg PO DAILY [facility]


   PRN Reason: Protocol


Furosemide (Lasix)  20 mg PO DAILY [facility]


   PRN Reason: Protocol


Gabapentin (Neurontin)  400 mg PO TID [facility]


   PRN Reason: Protocol


   Last Admin: 07/08/17 17:27 Dose:  400 mg


Glipizide (Glucotrol)  10 mg PO 0700,1700 [facility]


   PRN Reason: Protocol


   Last Admin: 07/08/17 17:27 Dose:  10 mg


Cefepime HCl (Maxipime 1gm)  1 gm in 100 mls @ 100 mls/hr IVPB Q8 [facility]


   PRN Reason: Protocol


   Last Admin: 07/08/17 21:36 Dose:  100 mls/hr


Vancomycin HCl (Vancomycin 1gm)  1 gm in 250 mls @ 167 mls/hr IVPB Q12H [facility]


   PRN Reason: Protocol


   Last Admin: 07/08/17 17:28 Dose:  167 mls/hr


Insulin Human Regular (Humulin R High)  0 units SC ACHS [facility]


   PRN Reason: Protocol


   Last Admin: 07/08/17 21:49 Dose:  Not Given


Metformin HCl (Glucophage)  1,000 mg PO ACBD [facility]


   PRN Reason: Protocol


   Last Admin: 07/08/17 17:43 Dose:  1,000 mg


Potassium Chloride (K-Dur 20 Meq Er Tab)  20 meq PO 0800 [facility]


   PRN Reason: Protocol


Ramipril (Altace)  10 mg PO DAILY [facility]


   PRN Reason: Protocol


Warfarin Sodium (Coumadin)  10 mg PO 1800 [facility]


   PRN Reason: Protocol


Warfarin Sodium (Coumadin)  2 mg PO 1800 [facility]


   PRN Reason: Protocol











Physical Exam





- Constitutional


Appears: Non-toxic, No Acute Distress





- Head Exam


Head Exam: NORMAL INSPECTION





- ENT Exam


ENT Exam: Mucous Membranes Moist





- Neck Exam


Neck exam: Negative for: Lymphadenopathy, Meningismus





- Respiratory Exam


Respiratory Exam: Decreased Breath Sounds





- Cardiovascular Exam


Cardiovascular Exam: +S1, +S2





- GI/Abdominal Exam


GI & Abdominal Exam: Soft.  absent: Tenderness





- Extremities Exam


Additional comments: 





both legs with dry dressings in place





Results





- Vital Signs


Recent Vital Signs: 


 Last Vital Signs











Temp  98 F   07/08/17 16:00


 


Pulse  68   07/08/17 16:00


 


Resp  18   07/08/17 16:00


 


BP  133/76   07/08/17 16:00


 


Pulse Ox  95   07/08/17 16:00














- Labs


Labs: 


 Laboratory Results - last 24 hr











  07/08/17 07/08/17





  16:37 21:36


 


POC Glucose (mg/dL)  183 H  167 H














Assessment & Plan





- Assessment and Plan (Free Text)


Plan: 





Assessment


Probable right leg skin and skin structure infection associated with chronic 

leg ulcers, growing MRSA


history of sepsis secondary to right leg skin/skin structure infection with 

chronic leg ulcers, growing MRSA and sensitive Klebsiella 


history of MRSA cellulitis Sept. 2016


history of Bilateral lower extremity skin and skin structure infection (

Enterobacter, Klebsiella Oxytoca and Group B Strep, as well as MRSA) which was 

treated 9/2015; MRSA and Pseudomonas cellulitis of left leg in Dec 2015, May 

and June 2016


chronic lymphedema of the lower extremities


Morbid obesity with BMI 55


HTN


DM


history of Strep bacteremia


history of hernia surgery


Learning disability





Plan


continue Vancomycin day 4 and will continue to follow clinically - will target 7

-10 days of therapy

## 2017-07-10 LAB
INR PPP: 2.06 (ref 0.93–1.08)
PROTHROMBIN TIME: 22.2 SECONDS (ref 9.9–11.8)

## 2017-07-10 RX ADMIN — DILTIAZEM HYDROCHLORIDE SCH MG: 120 CAPSULE, COATED, EXTENDED RELEASE ORAL at 10:10

## 2017-07-10 RX ADMIN — INSULIN HUMAN SCH UNITS: 100 INJECTION, SOLUTION PARENTERAL at 06:36

## 2017-07-10 RX ADMIN — POTASSIUM CHLORIDE SCH MEQ: 20 TABLET, EXTENDED RELEASE ORAL at 08:32

## 2017-07-10 RX ADMIN — INSULIN HUMAN SCH UNITS: 100 INJECTION, SOLUTION PARENTERAL at 12:48

## 2017-07-10 RX ADMIN — INSULIN HUMAN SCH UNITS: 100 INJECTION, SOLUTION PARENTERAL at 17:43

## 2017-07-11 PROCEDURE — F08Z4ZZ HOME MANAGEMENT TREATMENT: ICD-10-PCS | Performed by: INTERNAL MEDICINE

## 2017-07-11 RX ADMIN — INSULIN HUMAN SCH: 100 INJECTION, SOLUTION PARENTERAL at 11:55

## 2017-07-11 RX ADMIN — POTASSIUM CHLORIDE SCH MEQ: 20 TABLET, EXTENDED RELEASE ORAL at 07:53

## 2017-07-11 RX ADMIN — INSULIN HUMAN SCH: 100 INJECTION, SOLUTION PARENTERAL at 06:47

## 2017-07-11 RX ADMIN — INSULIN HUMAN SCH UNITS: 100 INJECTION, SOLUTION PARENTERAL at 06:52

## 2017-07-11 RX ADMIN — INSULIN HUMAN SCH: 100 INJECTION, SOLUTION PARENTERAL at 22:16

## 2017-07-11 RX ADMIN — INSULIN HUMAN SCH: 100 INJECTION, SOLUTION PARENTERAL at 17:31

## 2017-07-11 RX ADMIN — DILTIAZEM HYDROCHLORIDE SCH MG: 120 CAPSULE, COATED, EXTENDED RELEASE ORAL at 10:14

## 2017-07-12 RX ADMIN — INSULIN HUMAN SCH: 100 INJECTION, SOLUTION PARENTERAL at 17:35

## 2017-07-12 RX ADMIN — INSULIN HUMAN SCH UNITS: 100 INJECTION, SOLUTION PARENTERAL at 11:55

## 2017-07-12 RX ADMIN — DILTIAZEM HYDROCHLORIDE SCH MG: 120 CAPSULE, COATED, EXTENDED RELEASE ORAL at 09:48

## 2017-07-12 RX ADMIN — INSULIN HUMAN SCH: 100 INJECTION, SOLUTION PARENTERAL at 21:40

## 2017-07-12 RX ADMIN — INSULIN HUMAN SCH UNITS: 100 INJECTION, SOLUTION PARENTERAL at 06:35

## 2017-07-12 RX ADMIN — POTASSIUM CHLORIDE SCH MEQ: 20 TABLET, EXTENDED RELEASE ORAL at 07:53

## 2017-07-13 LAB
ALBUMIN SERPL-MCNC: 3.7 G/DL (ref 3–4.8)
ALBUMIN/GLOB SERPL: 1.2 {RATIO} (ref 1.1–1.8)
ALT SERPL-CCNC: 44 U/L (ref 7–56)
AST SERPL-CCNC: 28 U/L (ref 15–59)
BUN SERPL-MCNC: 9 MG/DL (ref 7–21)
CALCIUM SERPL-MCNC: 9.3 MG/DL (ref 8.4–10.5)
GFR NON-AFRICAN AMERICAN: > 60
INR PPP: 2.84 (ref 0.93–1.08)
PROTHROMBIN TIME: 30.7 SECONDS (ref 9.9–11.8)

## 2017-07-13 RX ADMIN — INSULIN HUMAN SCH: 100 INJECTION, SOLUTION PARENTERAL at 17:47

## 2017-07-13 RX ADMIN — INSULIN HUMAN SCH: 100 INJECTION, SOLUTION PARENTERAL at 21:58

## 2017-07-13 RX ADMIN — DILTIAZEM HYDROCHLORIDE SCH MG: 120 CAPSULE, COATED, EXTENDED RELEASE ORAL at 09:10

## 2017-07-13 RX ADMIN — POTASSIUM CHLORIDE SCH MEQ: 20 TABLET, EXTENDED RELEASE ORAL at 08:13

## 2017-07-13 RX ADMIN — INSULIN HUMAN SCH UNITS: 100 INJECTION, SOLUTION PARENTERAL at 06:38

## 2017-07-13 RX ADMIN — INSULIN HUMAN SCH UNITS: 100 INJECTION, SOLUTION PARENTERAL at 11:42

## 2017-07-14 RX ADMIN — INSULIN HUMAN SCH: 100 INJECTION, SOLUTION PARENTERAL at 22:00

## 2017-07-14 RX ADMIN — POTASSIUM CHLORIDE SCH MEQ: 20 TABLET, EXTENDED RELEASE ORAL at 08:20

## 2017-07-14 RX ADMIN — DILTIAZEM HYDROCHLORIDE SCH MG: 120 CAPSULE, COATED, EXTENDED RELEASE ORAL at 09:35

## 2017-07-14 RX ADMIN — INSULIN HUMAN SCH: 100 INJECTION, SOLUTION PARENTERAL at 12:04

## 2017-07-14 RX ADMIN — INSULIN HUMAN SCH UNITS: 100 INJECTION, SOLUTION PARENTERAL at 17:24

## 2017-07-14 RX ADMIN — INSULIN HUMAN SCH UNITS: 100 INJECTION, SOLUTION PARENTERAL at 06:29

## 2017-07-15 RX ADMIN — INSULIN HUMAN SCH: 100 INJECTION, SOLUTION PARENTERAL at 21:31

## 2017-07-15 RX ADMIN — POTASSIUM CHLORIDE SCH MEQ: 20 TABLET, EXTENDED RELEASE ORAL at 08:09

## 2017-07-15 RX ADMIN — INSULIN HUMAN SCH UNITS: 100 INJECTION, SOLUTION PARENTERAL at 17:35

## 2017-07-15 RX ADMIN — INSULIN HUMAN SCH UNITS: 100 INJECTION, SOLUTION PARENTERAL at 13:04

## 2017-07-15 RX ADMIN — INSULIN HUMAN SCH: 100 INJECTION, SOLUTION PARENTERAL at 06:43

## 2017-07-15 RX ADMIN — DILTIAZEM HYDROCHLORIDE SCH MG: 120 CAPSULE, COATED, EXTENDED RELEASE ORAL at 09:50

## 2017-07-15 NOTE — PN
DATE:  07/13/2017



SUBJECTIVE:  Patient is in room 320, was seen earlier.  No fevers, no

chills.  Overall, much improved.



PHYSICAL EXAMINATION:

VITAL SIGNS:  Temperature is 98, blood pressure is 130/70, respiratory rate

of 16.

HEENT:  Unremarkable.

NECK:  Supple.

LUNGS:  Decreased breath sounds.

HEART:  Normal S1, S2.

ABDOMEN:  Noted.

EXTREMITIES:  Examination of lower extremities are noted.



LABORATORY EXAMINATION:  Reveals a BUN of 9, creatinine of 0.5 and review

of orders reveals the patient to be on vancomycin.  Microbiology reveals

the patient's cultures are MRSA from the right leg, and patient's last

creatinine was 0.5.



ASSESSMENT AND PLAN:  This is a 57-year-old male with morbid obesity, body

mass index of 52 with a probable right leg skin and soft tissue infection

with methicillin-resistant Staphylococcus aureus and history of sepsis. 

Today is day #7 of vancomycin, will complete 7 to 10 days and we will

follow with you.





__________________________________________

Gilbert Villegas MD



DD:  07/13/2017 13:50:55

DT:  07/13/2017 17:26:04

Job # [id number]

## 2017-07-15 NOTE — PN
DATE:  07/14/2017



SUBJECTIVE:  The patient has no complaints of chest pain, no headaches, or

dizziness.



PHYSICAL EXAMINATION:

VITAL SIGNS:  Temperature is 97.7, pulse is 62, blood pressure is 135/83,

respirations 17.

HEENT:  Atraumatic, normocephalic.  Anicteric sclerae.  Moist mucosa.

NECK:  No JVD, adenopathy or bruits.

CARDIOVASCULAR:  S1, S2 is regular.  No murmurs, rubs or gallops.

LUNGS:  Clear to auscultation bilaterally, good air entry.  No wheezes or

rales.

ABDOMEN:  Bowel sounds are positive, soft, nontender, nondistended.

EXTREMITIES:  Lower extremities, there are chronic skin changes in the

legs.  Full range of motion.



ASSESSMENT:

1.  Lower extremity cellulitis, resolved.

2.  Atrial fibrillation.

3.  Obesity with a BMI of 52.

4.  Hypertension.

5.  Dyslipidemia.

6.  Diabetes type 2.



PLAN:  The patient is currently comfortable.  He is on the diltiazem for

his hypertension.  He is on Altace as well.  His blood pressure is

controlled.  He is on Coumadin.  He is on Lipitor for his dyslipidemia as

well as metformin.  The patient is on potassium replacement and Lipitor for

dyslipidemia.  He has finished with vancomycin.  I will discontinue his

vancomycin at this point.  His mother is also in the hospital on the

Transitional Care Unit.  We will need to coordinate the patient's

discharge.  He is walking well with physical therapy.  He walks more than

100 feet.  His last INR was therapeutic.  I will repeat his INR in a few

days.





__________________________________________

Delbert Corona MD



DD:  07/14/2017 8:39:14

DT:  07/14/2017 16:27:30

Job # [id number]

## 2017-07-16 LAB
INR PPP: 4.68 (ref 0.93–1.08)
PROTHROMBIN TIME: 50.5 SECONDS (ref 9.9–11.8)

## 2017-07-16 RX ADMIN — INSULIN HUMAN SCH: 100 INJECTION, SOLUTION PARENTERAL at 13:21

## 2017-07-16 RX ADMIN — DILTIAZEM HYDROCHLORIDE SCH MG: 120 CAPSULE, COATED, EXTENDED RELEASE ORAL at 09:50

## 2017-07-16 RX ADMIN — INSULIN HUMAN SCH UNITS: 100 INJECTION, SOLUTION PARENTERAL at 06:31

## 2017-07-16 RX ADMIN — INSULIN HUMAN SCH: 100 INJECTION, SOLUTION PARENTERAL at 22:07

## 2017-07-16 RX ADMIN — POTASSIUM CHLORIDE SCH MEQ: 20 TABLET, EXTENDED RELEASE ORAL at 08:01

## 2017-07-16 RX ADMIN — INSULIN HUMAN SCH UNITS: 100 INJECTION, SOLUTION PARENTERAL at 17:20

## 2017-07-17 VITALS
HEART RATE: 67 BPM | RESPIRATION RATE: 12 BRPM | SYSTOLIC BLOOD PRESSURE: 149 MMHG | OXYGEN SATURATION: 99 % | TEMPERATURE: 98 F | DIASTOLIC BLOOD PRESSURE: 78 MMHG

## 2017-07-17 LAB
INR PPP: 4.4 (ref 0.93–1.08)
PROTHROMBIN TIME: 47.5 SECONDS (ref 9.9–11.8)

## 2017-07-17 RX ADMIN — DILTIAZEM HYDROCHLORIDE SCH MG: 120 CAPSULE, COATED, EXTENDED RELEASE ORAL at 09:52

## 2017-07-17 RX ADMIN — INSULIN HUMAN SCH UNITS: 100 INJECTION, SOLUTION PARENTERAL at 06:33

## 2017-07-17 RX ADMIN — POTASSIUM CHLORIDE SCH MEQ: 20 TABLET, EXTENDED RELEASE ORAL at 08:45

## 2017-07-17 RX ADMIN — INSULIN HUMAN SCH UNITS: 100 INJECTION, SOLUTION PARENTERAL at 13:44

## 2017-07-17 NOTE — PN
SUBJECTIVE:  The patient is 57-year-old, seen and examined, sitting in

chair, seems very comfortable, not in any distress.  No fever, no nausea,

no vomiting, no diarrhea.



PHYSICAL EXAMINATION:

VITAL SIGNS:  He is afebrile.  Pulse 67, respirations 18, blood pressure

146/73.

LUNGS:  Bilateral fair airflow.  No rhonchi or crackles.

HEART:  S1 and S2 audible.

ABDOMEN:  Soft, obese, nontender.  No rebound, no guarding.

NEUROLOGIC:  He is awake and alert, able to communicate.

EXTREMITIES:  Bilateral legs are wrapped in pressure dressings.  Bilateral

chronic stasis dermatitis visible on both feet.



LABORATORY EXAM:  His PT is 50.5 and INR 4.68.  Chemistry:  Blood sugar is

182.



ASSESSMENT:

1.  Chronic stasis bilateral dermatitis with cellulitis.

2.  Morbid obesity.

3.  Methicillin-resistant Staphylococcus aureus wound infection.

4.  Hypertension.

5.  Non-insulin-dependent diabetes.

6.  Learning disability.



PLAN:  We will continue the patient on current medical treatment.  We will

hold his Coumadin for today and tomorrow.  Follow up PT and INR on Tuesday

and restart Coumadin according to the patient's levels.







__________________________________________

Yolanda Conroy MD





DD:  07/16/2017 18:12:10

DT:  07/16/2017 22:51:40

Job # [id number]

## 2017-08-18 ENCOUNTER — HOSPITAL ENCOUNTER (INPATIENT)
Dept: HOSPITAL 42 - ED | Age: 57
LOS: 3 days | Discharge: NURSING, REHAB OR LONG-TERM CARE | DRG: 694 | End: 2017-08-21
Attending: INTERNAL MEDICINE | Admitting: INTERNAL MEDICINE
Payer: MEDICARE

## 2017-08-18 VITALS — BODY MASS INDEX: 40.3 KG/M2

## 2017-08-18 DIAGNOSIS — F32.9: ICD-10-CM

## 2017-08-18 DIAGNOSIS — E11.22: ICD-10-CM

## 2017-08-18 DIAGNOSIS — I13.0: ICD-10-CM

## 2017-08-18 DIAGNOSIS — I50.9: ICD-10-CM

## 2017-08-18 DIAGNOSIS — E78.5: ICD-10-CM

## 2017-08-18 DIAGNOSIS — Z87.440: ICD-10-CM

## 2017-08-18 DIAGNOSIS — E66.01: ICD-10-CM

## 2017-08-18 DIAGNOSIS — L97.829: ICD-10-CM

## 2017-08-18 DIAGNOSIS — F81.9: ICD-10-CM

## 2017-08-18 DIAGNOSIS — B96.89: ICD-10-CM

## 2017-08-18 DIAGNOSIS — B95.62: ICD-10-CM

## 2017-08-18 DIAGNOSIS — L03.115: ICD-10-CM

## 2017-08-18 DIAGNOSIS — F79: ICD-10-CM

## 2017-08-18 DIAGNOSIS — L03.116: ICD-10-CM

## 2017-08-18 DIAGNOSIS — N18.9: ICD-10-CM

## 2017-08-18 DIAGNOSIS — R65.10: ICD-10-CM

## 2017-08-18 DIAGNOSIS — I87.8: ICD-10-CM

## 2017-08-18 DIAGNOSIS — I48.2: ICD-10-CM

## 2017-08-18 DIAGNOSIS — Z79.4: ICD-10-CM

## 2017-08-18 DIAGNOSIS — N20.2: Primary | ICD-10-CM

## 2017-08-18 DIAGNOSIS — Z79.01: ICD-10-CM

## 2017-08-18 DIAGNOSIS — E11.40: ICD-10-CM

## 2017-08-18 DIAGNOSIS — I89.0: ICD-10-CM

## 2017-08-18 DIAGNOSIS — E11.622: ICD-10-CM

## 2017-08-18 DIAGNOSIS — L97.919: ICD-10-CM

## 2017-08-18 DIAGNOSIS — E11.65: ICD-10-CM

## 2017-08-18 LAB
ADD MANUAL DIFF?: NO
ALBUMIN/GLOB SERPL: 1.2 {RATIO} (ref 1.1–1.8)
ALP SERPL-CCNC: 122 U/L (ref 38–133)
ALT SERPL-CCNC: 36 U/L (ref 7–56)
AMYLASE SERPL-CCNC: 48 U/L (ref 35–125)
APPEARANCE UR: (no result)
APTT BLD: 29.8 SECONDS (ref 23.7–30.8)
AST SERPL-CCNC: 20 U/L (ref 15–59)
BASE EXCESS BLDV CALC-SCNC: -0.6 MMOL/L (ref 0–2)
BASOPHILS # BLD AUTO: 0.02 K/MM3 (ref 0–2)
BASOPHILS NFR BLD: 0.1 % (ref 0–3)
BILIRUB SERPL-MCNC: 1 MG/DL (ref 0.2–1.3)
BILIRUB UR-MCNC: NEGATIVE MG/DL
BUN SERPL-MCNC: 20 MG/DL (ref 7–21)
CALCIUM SERPL-MCNC: 9.4 MG/DL (ref 8.4–10.5)
CHLORIDE SERPL-SCNC: 99 MMOL/L (ref 98–107)
CO2 SERPL-SCNC: 24 MMOL/L (ref 21–33)
COLOR UR: YELLOW
EOSINOPHIL # BLD: 0.1 10*3/UL (ref 0–0.7)
EOSINOPHIL NFR BLD: 0.8 % (ref 1.5–5)
ERYTHROCYTE [DISTWIDTH] IN BLOOD BY AUTOMATED COUNT: 13.2 % (ref 11.5–14.5)
GLOBULIN SER-MCNC: 3.4 GM/DL
GLUCOSE SERPL-MCNC: 305 MG/DL (ref 70–110)
GLUCOSE UR STRIP-MCNC: >=1000 MG/DL
GRANULOCYTES # BLD: 11.78 10*3/UL (ref 1.4–6.5)
GRANULOCYTES NFR BLD: 82 % (ref 50–68)
HCT VFR BLD CALC: 42.5 % (ref 42–52)
INR PPP: 1.02 (ref 0.93–1.08)
KETONES UR STRIP-MCNC: 15 MG/DL
LEUKOCYTE ESTERASE UR-ACNC: NEGATIVE LEU/UL
LIPASE SERPL-CCNC: 78 U/L (ref 23–300)
LYMPHOCYTES # BLD: 1.2 10*3/UL (ref 1.2–3.4)
LYMPHOCYTES NFR BLD AUTO: 8.1 % (ref 22–35)
MCH RBC QN AUTO: 29.5 PG (ref 25–35)
MCHC RBC AUTO-ENTMCNC: 33.6 G/DL (ref 31–37)
MCV RBC AUTO: 87.6 FL (ref 80–105)
MONOCYTES # BLD AUTO: 1.3 10*3/UL (ref 0.1–0.6)
MONOCYTES NFR BLD: 9 % (ref 1–6)
PH BLDV: 7.37 [PH] (ref 7.32–7.43)
PH UR STRIP: 6 [PH] (ref 4.7–8)
PLATELET # BLD: 269 10^3/UL (ref 120–450)
PMV BLD AUTO: 10.3 FL (ref 7–11)
POTASSIUM SERPL-SCNC: 4.7 MMOL/L (ref 3.6–5)
PROT SERPL-MCNC: 7.5 G/DL (ref 5.8–8.3)
PROT UR STRIP-MCNC: NEGATIVE MG/DL
RBC # UR STRIP: (no result) /UL
SODIUM SERPL-SCNC: 135 MMOL/L (ref 132–148)
SP GR UR STRIP: 1.01 (ref 1–1.03)
TROPONIN I SERPL-MCNC: < 0.01 NG/ML
UROBILINOGEN UR STRIP-ACNC: 0.2 E.U./DL
WBC # BLD AUTO: 14.4 10^3/UL (ref 4.5–11)
WBC #/AREA URNS HPF: NEGATIVE /HPF (ref 0–6)

## 2017-08-18 RX ADMIN — VANCOMYCIN HYDROCHLORIDE SCH MLS/HR: 1 INJECTION, POWDER, LYOPHILIZED, FOR SOLUTION INTRAVENOUS at 22:45

## 2017-08-18 RX ADMIN — VANCOMYCIN HYDROCHLORIDE SCH: 1 INJECTION, POWDER, LYOPHILIZED, FOR SOLUTION INTRAVENOUS at 18:14

## 2017-08-18 RX ADMIN — VANCOMYCIN HYDROCHLORIDE SCH MLS/HR: 1 INJECTION, POWDER, LYOPHILIZED, FOR SOLUTION INTRAVENOUS at 16:57

## 2017-08-18 RX ADMIN — CEFEPIME SCH MLS/HR: 1 INJECTION, SOLUTION INTRAVENOUS at 21:22

## 2017-08-18 RX ADMIN — POTASSIUM CHLORIDE SCH MEQ: 20 TABLET, EXTENDED RELEASE ORAL at 12:57

## 2017-08-18 RX ADMIN — INSULIN HUMAN SCH UNITS: 100 INJECTION, SOLUTION PARENTERAL at 16:48

## 2017-08-18 RX ADMIN — DILTIAZEM HYDROCHLORIDE SCH MG: 120 CAPSULE, COATED, EXTENDED RELEASE ORAL at 12:56

## 2017-08-18 NOTE — ED PDOC
Arrival/HPI





- General


Chief Complaint: Abdominal Pain


Time Seen by Provider: 08/18/17 08:17


Historian: Patient





- History of Present Illness


Narrative History of Present Illness (Text): 





08/18/17 08:24


Jayme Cotton is a 57 year old male, with a history of afib on eliquis and 

chronic leg ulcers, presents to the emergency department complaining of 3 day 

duration of elevated blood glucose level and lower abdominal pain. States 

symptoms are associated with nausea. Denies any vomiting, diarrhea, or urinary 

symptoms. Patient also notes of discharge from open wounds on right leg. He was 

admitted to the hospital recently for right lower extremity ulcers. Denies any 

headache dizziness, chest pain, difficulty breathing, back pain, or any other 

complaints at this time. 





 


Time/Duration: Other (3 day)


Symptom Onset: Gradual


Symptom Course: Unchanged


Severity Level: Mild


Activities at Onset: Light





Past Medical History





- Provider Review


Nursing Documentation Reviewed: Yes





- Infectious Disease


Hx of Infectious Diseases: None, MRSA





- Tetanus Immunization


Tetanus Immunization: Unknown





- Cardiac


Hx Cardiac Disorders: Yes (Afib)


Hx Congestive Heart Failure: Yes


Hx Hypertension: Yes





- Pulmonary


Hx Chronic Obstructive Pulmonary Disease (COPD): No





- Neurological


Other/Comment: Epilepsy





- HEENT


Hx HEENT Disorder: Yes


Other/Comment: wears glasses





- Renal


Hx Renal Failure: No





- Endocrine/Metabolic


Hx Diabetes Mellitus Type 2: Yes





- Hematological/Oncological


Hx Blood Disorders: No


Hx AIDS: No


Hx Anemia: No


Hx Cancer: No


Hx Chemotherapy: No


Hx Cirrhosis: No


Hx Hepatitis A: No


Hx Hepatitis B: No


Hx Hepatitis C: No


Hx Metastasis: No


Hx Shingles: No


Hx Unexplained Bleeding: No


Other/Comment: blood infection





- Integumentary


Hx Dermatological Disorder: Yes


Hx Basal Cell Carcinoma: No


Hx Eczema: No


Hx Melanoma: No


Hx Psoriasis: No


Hx Squamous Cell Carcinoma: No


Other/Comment: multiple wounds on the rt leg, cellulitis in b/l extreminity. 

Left thigh cellulitis





- Musculoskeletal/Rheumatological


Hx Falls: Yes





- Gastrointestinal


Hx Gastrointestinal Disorders: No


Hx Colostomy: No


Hx Crohn's Disease: No


Hx Diverticulitis: No


Hx Gall Bladder Disease: No


Hx Gastroesophageal Reflux: No


Hx Ileostomy: No


Hx Liver Failure: No


Hx Pancreatitis: No


HX Swallowing Problems: No





- Genitourinary/Gynecological


Hx Reproductive Disorders: No





- Psychiatric


Hx Psychophysiologic Disorder: Yes


Hx Substance Use: No


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





- Surgical History


Hx Amputation: No


Hx Appendectomy: No


Hx Cholecystectomy: No


Hx Gastric Bypass Surgery: No


Hx Hysterectomy: No


Hx Joint Replacement: No


Hx Kidney Transplant: No


Hx Liver Transplant: No


Hx Mastectomy: No


Hx Musculoskeletal Surgery: No


Hx Open Heart Surgery: No


Hx Orthopedic Surgery: No


Hx Splenectomy: No


Hx Valve Replacement: No





- Anesthesia


Hx Anesthesia: Yes


Hx Anesthesia Reactions: No


Hx Malignant Hyperthermia: No





- Suicidal Assessment


Feels Threatened In Home Enviroment: No





Family/Social History





- Physician Review


Nursing Documentation Reviewed: Yes


Family/Social History: No Known Family HX


Smoking Status: Former Smoker


Hx Alcohol Use: No


Hx Substance Use: No


Hx Substance Use Treatment: No





Allergies/Home Meds


Allergies/Adverse Reactions: 


Allergies





No Known Allergies Allergy (Verified 08/18/17 12:16)


 








Home Medications: 


 Home Meds











 Medication  Instructions  Recorded  Confirmed


 


Apixaban [Eliquis] 2.5 mg PO BID 08/18/17 08/18/17


 


Atorvastatin [Lipitor] 10 mg PO DAILY 08/18/17 08/18/17


 


Diltiazem HCl [Diltiazem ER] 120 mg PO DAILY 08/18/17 08/18/17


 


Ferrous Fum/Vit C/B12/Stomc [Ziks 1 cap PO DAILY 08/18/17 08/18/17





Hematogen]   


 


Furosemide [Lasix] 20 mg PO DAILY 08/18/17 08/18/17


 


Gabapentin [Neurontin] 400 mg PO TID 08/18/17 08/18/17


 


GlipiZIDE [Glipizide] 10 mg PO BID 08/18/17 08/18/17


 


Insulin Lispro [Humalog Kwikpen 1 unit SQ ACHS 08/18/17 08/18/17





U-100]   


 


Metformin HCl [Glucophage] 1,000 mg PO BID 08/18/17 08/18/17


 


Omeprazole 20 mg PO DAILY 08/18/17 08/18/17


 


Omeprazole 20 mg PO DAILY 08/18/17 08/18/17


 


Pioglitazone HCl 45 mg PO DAILY 08/18/17 08/18/17


 


Potassium Chloride [Klor-Con M20] 20 meq PO DAILY 08/18/17 08/18/17


 


Ramipril [Altace] 10 mg PO DAILY 08/18/17 08/18/17














Review of Systems





- Physician Review


All systems were reviewed & negative as marked: Yes





- Review of Systems


Constitutional: Normal.  absent: Fatigue, Fevers


Respiratory: Normal.  absent: SOB, Cough


Cardiovascular: Normal.  absent: Chest Pain, Palpitations


Gastrointestinal: Abdominal Pain, Nausea.  absent: Diarrhea, Vomiting


Musculoskeletal: Other (drainage from open wound on right leg )


Psychiatric: Normal





Physical Exam


Vital Signs Reviewed: Yes


Vital Signs











  Temp Pulse Resp BP Pulse Ox


 


 08/18/17 11:19   98 H  18  148/76  99


 


 08/18/17 09:32  99.4 F    


 


 08/18/17 08:21  98 F  92 H  18  154/61 H  95











Temperature: Afebrile


Blood Pressure: Normal


Pulse: Regular


Respiratory Rate: Normal


Appearance: Positive for: Non-Toxic, Other (Morbidly obese )


Pain Distress: None


Mental Status: Positive for: Alert and Oriented X 3


Finger Stick Blood Glucose: 280





- Systems Exam


Head: Present: Atraumatic, Normocephalic


Pupils: Present: PERRL


Conjunctiva: Present: Normal


Mouth: Present: Moist Mucous Membranes


Respiratory/Chest: Present: Clear to Auscultation, Good Air Exchange.  No: 

Respiratory Distress, Accessory Muscle Use


Cardiovascular: Present: Regular Rate and Rhythm, Normal S1, S2.  No: Murmurs


Abdomen: Present: Tenderness (minimal lower abdominal tenderness ), Normal 

Bowel Sounds.  No: Distention, Peritoneal Signs, Guarding, McBur[place]'s [place] 

Tender


Upper Extremity: Present: Normal Inspection.  No: Cyanosis, Edema


Lower Extremity: Present: Edema, Other (purulent discharge from open wound on 

right leg. b/l lower extremity elephantiasis. ).  No: NORMAL PULSES, Cyanosis, 

Deformity, Temperature Abnormalties


Neurological: Present: GCS=15, CN II-XII Intact, Speech Normal


Skin: Present: Warm, Dry, Normal Color.  No: Rashes


Psychiatric: Present: Alert, Oriented x 3, Normal Insight, Normal Concentration





Medical Decision Making


ED Course and Treatment: 





08/18/17 08:38


Impression: A 57 year old male who presents to the emergency department 

complaining of elevated blood glucose level, abdominal pain and discharge from 

open wound on right leg. 





Plan:


-- EKG


-- Labs, cardiac enzymes


-- Chest X-ray


-- Toradol


--Zofran


-- IV Fluids


-- Vanco


-- Zosyn


-- Blood culture


-- Urine culture


-- Urinalysis


-- Reassess and disposition








Progress Notes:


08/18/17 08:40


EKG interpreted by me:


NSR @ 96 bpm. normal axis. normal interval. 





08/18/17 09:48


IMPRESSION:


Two separate ureteral stones seen on the left. There is 5 mm distal ureteral 

stone at the UVJ and a 5-6 mm stone in the proximal ureter. There is also left-

sided nephrolithiasis 





08/18/17 10:50


Case discussed with  who is aware and agrees with the plan to admit 

patient to med/surg for cellulitis and renal colic. Accepts patient under his 

service with  on consult. 


Patient is aware and agrees with the plan. 








- Lab Interpretations


Lab Results: 








 08/18/17 08:25 





 08/18/17 08:25 





 Lab Results





08/18/17 09:15: PT 11.0, INR 1.02, APTT 29.8


08/18/17 09:09: Urine Color Yellow, Urine Appearance Sl cloudy, Urine pH 6.0, 

Ur Specific Gravity 1.010, Urine Protein Negative, Urine Glucose (UA) >=1000, 

Urine Ketones 15 H, Urine Blood Small H, Urine Nitrate Negative, Urine 

Bilirubin Negative, Urine Urobilinogen 0.2, Ur Leukocyte Esterase Negative, 

Urine RBC 5 - 10, Urine WBC Negative


08/18/17 08:25: pO2 68 H, VBG pH 7.37, VBG pCO2 43.0, VBG HCO3 24.9, VBG Total 

CO2 26.2, VBG O2 Sat (Calc) 96.1 H, VBG Base Excess -0.6 L, VBG Potassium 4.7, 

Sodium 133.0, Chloride 98.0, Glucose 322 H, Lactate 1.1, FiO2 21.0, Venous 

Blood Potassium 4.7


08/18/17 08:25: Sodium 135, Chloride 99, Potassium 4.7, Carbon Dioxide 24, 

Anion Gap 17, BUN 20, Creatinine 1.0, Est GFR (African Amer) > 60, Est GFR (Non-

Af Amer) > 60, Random Glucose 305 H*, Calcium 9.4, Total Bilirubin 1.0, AST 20, 

ALT 36, Alkaline Phosphatase 122, Lactate Dehydrogenase 343, Total Creatine 

Kinase 31 L, Troponin I < 0.01, Total Protein 7.5, Albumin 4.1, Globulin 3.4, 

Albumin/Globulin Ratio 1.2, Amylase 48, Lipase 78


08/18/17 08:25: WBC 14.4 H D, RBC 4.85, Hgb 14.3, Hct 42.5, MCV 87.6, MCH 29.5, 

MCHC 33.6, RDW 13.2, Plt Count 269, MPV 10.3, Gran % 82.0 H, Lymph % (Auto) 8.1 

L, Mono % (Auto) 9.0 H, Eos % (Auto) 0.8 L, Baso % (Auto) 0.1, Gran # 11.78 H, 

Lymph # 1.2, Mono # 1.3 H, Eos # 0.1, Baso # 0.02


08/18/17 08:20: POC Glucose (mg/dL) 280 H











- RAD Interpretation


Radiology Orders: 








08/18/17 09:05


ABD & PELVIS IV CONTRAST ONLY [CT] Stat 














- Medication Orders


Current Medication Orders: 








Apixaban (Eliquis)  2.5 mg PO BID [facility]


   PRN Reason: Protocol


Atorvastatin Calcium (Lipitor)  10 mg PO DAILY [facility]


   Last Admin: 08/18/17 12:57  Dose: 10 mg





Diltiazem HCl (Cardizem Cd)  120 mg PO DAILY [facility]


   Last Admin: 08/18/17 12:56  Dose: 120 mg





Furosemide (Lasix)  20 mg PO DAILY [facility]


   Last Admin: 08/18/17 12:56  Dose: 20 mg





Gabapentin (Neurontin)  400 mg PO TID [facility]


   PRN Reason: Protocol


   Last Admin: 08/18/17 17:02  Dose: 400 mg





Glipizide (Glucotrol)  10 mg PO BID [facility]


   Last Admin: 08/18/17 17:02  Dose: 10 mg





Cefepime HCl (Maxipime 1gm)  1 gm in 100 mls @ 100 mls/hr IVPB Q8 [facility]


   PRN Reason: Protocol


Vancomycin HCl (Vancomycin 1gm)  1 gm in 250 mls @ 167 mls/hr IVPB Q12 [facility]


   PRN Reason: Protocol


Insulin Human Regular (Humulin R Med)  0 units SC ACHS [facility]


   PRN Reason: Protocol


   Last Admin: 08/18/17 16:48  Dose: 7 units





Metformin HCl (Glucophage)  1,000 mg PO BID [facility]


   Last Admin: 08/18/17 17:02  Dose: 1,000 mg





Pioglitazone HCl (Actos)  45 mg PO DAILY [facility]


   Last Admin: 08/18/17 14:20  Dose: 45 mg





Potassium Chloride (K-Dur 20 Meq Er Tab)  20 meq PO DAILY [facility]


   Last Admin: 08/18/17 12:57  Dose: 20 meq





Ramipril (Altace)  10 mg PO DAILY [facility]


   Last Admin: 08/18/17 12:56  Dose: 10 mg





Discontinued Medications





Piperacillin Sod/Tazobactam Sod (Zosyn 3.375 In Ns 100ml)  100 mls @ 200 mls/hr 

IVPB STAT [facility]


   PRN Reason: Protocol


   Stop: 08/18/17 08:53


   Last Admin: 08/18/17 08:42  Dose: 200 mls/hr





Vancomycin HCl (Vancomycin 1gm)  1 gm in 250 mls @ 167 mls/hr IVPB [facility]


   PRN Reason: Protocol


   Stop: 08/18/17 09:54


   Last Admin: 08/18/17 09:54  Dose: 167 mls/hr





Sodium Chloride (Sodium Chloride 0.9%)  500 mls @ 999 mls/hr IV .Q31M [facility]


   Stop: 08/18/17 08:55


   Last Admin: 08/18/17 08:42  Dose: 999 mls/hr





Vancomycin HCl (Vancomycin 1gm)  1 gm in 250 mls @ 167 mls/hr IVPB Q12H [facility]


   PRN Reason: Protocol


Insulin Human Regular (Humulin R)  4 units IV [facility]


   Stop: 08/18/17 09:11


   Last Admin: 08/18/17 09:54  Dose: 4 units





Iohexol (Omnipaque 350 150 Ml) Confirm Administered Dose 150 ml .ROUTE .STK-MED 

ONE


   Stop: 08/18/17 09:16


Ketorolac Tromethamine (Toradol)  30 mg IVP STAT [facility]


   Stop: 08/18/17 08:24


   Last Admin: 08/18/17 08:43  Dose: 30 mg





Ondansetron HCl (Zofran Inj)  4 mg IVP STAT [facility]


   Stop: 08/18/17 08:24


   Last Admin: 08/18/17 08:42  Dose: 4 mg











- Scribe Statement


The provider has reviewed the documentation as recorded by the Stanislaw Damon 


Provider Attestation: 








All medical record entries made by the Stanislaw were at my direction and 

personally dictated by me. I have reviewed the chart and agree that the record 

accurately reflects my personal performance of the history, physical exam, 

medical decision making, and the department course for this patient. I have 

also personally directed, reviewed, and agree with the discharge instructions 

and disposition.





Disposition/Present on Arrival





- Present on Arrival


Any Indicators Present on Arrival: No


History of DVT/PE: No


History of Uncontrolled Diabetes: Yes


Urinary Catheter: No


History of Decub. Ulcer: No


History Surgical Site Infection Following: None





- Disposition


Have Diagnosis and Disposition been Completed?: Yes


Diagnosis: 


 Cellulitis, Nephrolithiasis, Lymphedema of both lower extremities, 

Hyperglycemia





Disposition: HOSPITALIZED


Disposition Time: 11:00


Patient Problems: 


 Current Active Problems











Problem Status Onset


 


Cellulitis Acute  


 


Hyperglycemia Acute  


 


Lymphedema of both lower extremities Acute  


 


Nephrolithiasis Acute  











Condition: FAIR

## 2017-08-18 NOTE — CARD
--------------- APPROVED REPORT --------------





EKG Measurement

Heart [id number]

OR [id number]

[id number]

[id number]

[id number]



<Conclusion>

Normal sinus rhythm

Normal ECG

## 2017-08-18 NOTE — CT
PROCEDURE:  CT Abdomen and Pelvis without intravenous contrast



HISTORY:

lower abd pain



COMPARISON:

02/19/2015



TECHNIQUE:

Without contrast.. 



Contrast Dose: 



Radiation dose:



Total exam DLP = 1225 mGy-cm.



This CT exam was performed using one or more of the following dose 

reduction techniques: Automated exposure control, adjustment of the 

mA and/or kV according to patient size, and/or use of iterative 

reconstruction technique.



FINDINGS:



LOWER THORAX:

Unremarkable. 



LIVER:

Unremarkable. No gross lesion or ductal dilatation.  



GALLBLADDER AND BILE DUCTS:

There is a density near the neck of the gallbladder suspicious for 

stone.  No evidence of cholecystitis. 



PANCREAS:

Unremarkable. No gross lesion or ductal dilatation.



SPLEEN:

Unremarkable. 



ADRENALS:

Unremarkable. No mass. 



KIDNEYS AND URETERS:

There is a 5 mm stone in the left distal ureter at the UVJ image 151. 

The ureter is mildly dilated and there is some periureteral stranding.



There is also a 6 mm stone in the proximal left ureter.  This finding 

was also seen on the previous study. 



There is also nephrolithiasis.  There is a 10 mm stone in the renal 

pelvis on the left. 



VASCULATURE:

Unremarkable. No aortic aneurysm. 



BOWEL:

Unremarkable. No obstruction. No gross mural thickening. 



APPENDIX:

Unremarkable. Normal appendix. 



PERITONEUM:

Unremarkable. No free fluid. No free air. 



LYMPH NODES:

Unremarkable. No enlarged lymph nodes. 



BLADDER:

Unremarkable. 



REPRODUCTIVE:

Unremarkable. 



BONES:

No acute fracture. 



OTHER FINDINGS:

None.



IMPRESSION:

Two separate ureteral stones seen on the left.  There is a 5 mm 

distal ureteral stone at the UVJ and a 5-6 mm stone in the proximal 

ureter.  There is also left-sided nephrolithiasis

## 2017-08-19 RX ADMIN — INSULIN HUMAN SCH UNITS: 100 INJECTION, SOLUTION PARENTERAL at 17:02

## 2017-08-19 RX ADMIN — DILTIAZEM HYDROCHLORIDE SCH MG: 120 CAPSULE, COATED, EXTENDED RELEASE ORAL at 10:50

## 2017-08-19 RX ADMIN — CEFEPIME SCH MLS/HR: 1 INJECTION, SOLUTION INTRAVENOUS at 05:46

## 2017-08-19 RX ADMIN — VANCOMYCIN HYDROCHLORIDE SCH MLS/HR: 1 INJECTION, POWDER, LYOPHILIZED, FOR SOLUTION INTRAVENOUS at 10:52

## 2017-08-19 RX ADMIN — CEFEPIME SCH MLS/HR: 1 INJECTION, SOLUTION INTRAVENOUS at 21:21

## 2017-08-19 RX ADMIN — CEFEPIME SCH MLS/HR: 1 INJECTION, SOLUTION INTRAVENOUS at 14:21

## 2017-08-19 RX ADMIN — INSULIN HUMAN SCH UNITS: 100 INJECTION, SOLUTION PARENTERAL at 12:16

## 2017-08-19 RX ADMIN — VANCOMYCIN HYDROCHLORIDE SCH MLS/HR: 1 INJECTION, POWDER, LYOPHILIZED, FOR SOLUTION INTRAVENOUS at 22:42

## 2017-08-19 RX ADMIN — PANTOPRAZOLE SODIUM SCH MG: 40 TABLET, DELAYED RELEASE ORAL at 05:46

## 2017-08-19 RX ADMIN — INSULIN HUMAN SCH UNITS: 100 INJECTION, SOLUTION PARENTERAL at 09:09

## 2017-08-19 RX ADMIN — POTASSIUM CHLORIDE SCH MEQ: 20 TABLET, EXTENDED RELEASE ORAL at 10:50

## 2017-08-19 NOTE — CP.PCM.PN
Subjective





- Date & Time of Evaluation


Date of Evaluation: 08/19/17


Time of Evaluation: 07:00





- Subjective


Subjective: 





Patient is a 57 M with chronic lymphedema and venous stasis. Patient was seen 

and examined at bedside today alert awake and oriented with no acute 

complaints. Patient does admit to tenderness in lower extremities bilaterally 

which according to patient's mother is always present. Patient denies f/c/n/v. 





Objective





- Vital Signs/Intake and Output


Vital Signs (last 24 hours): 


 











Temp Pulse Resp BP Pulse Ox


 


 97 F L  88   20   142/80   100 


 


 08/18/17 16:00  08/18/17 16:00  08/18/17 16:00  08/18/17 16:00  08/18/17 16:00








Intake and Output: 


 











 08/19/17 08/19/17





 06:59 18:59


 


Intake Total 480 


 


Output Total 600 


 


Balance -120 














- Medications


Medications: 


 Current Medications





Apixaban (Eliquis)  2.5 mg PO BID [facility]


   PRN Reason: Protocol


   Last Admin: 08/18/17 18:25 Dose:  2.5 mg


Atorvastatin Calcium (Lipitor)  10 mg PO DAILY [facility]


   Last Admin: 08/18/17 12:57 Dose:  10 mg


Diltiazem HCl (Cardizem Cd)  120 mg PO DAILY [facility]


   Last Admin: 08/18/17 12:56 Dose:  120 mg


Furosemide (Lasix)  20 mg PO DAILY [facility]


   Last Admin: 08/18/17 12:56 Dose:  20 mg


Gabapentin (Neurontin)  400 mg PO TID [facility]


   PRN Reason: Protocol


   Last Admin: 08/18/17 17:02 Dose:  400 mg


Glipizide (Glucotrol)  10 mg PO BID [facility]


   Last Admin: 08/18/17 17:02 Dose:  10 mg


Cefepime HCl (Maxipime 1gm)  1 gm in 100 mls @ 100 mls/hr IVPB Q8 [facility]


   PRN Reason: Protocol


   Last Admin: 08/19/17 05:46 Dose:  100 mls/hr


Vancomycin HCl (Vancomycin 1gm)  1 gm in 250 mls @ 167 mls/hr IVPB Q12 [facility]


   PRN Reason: Protocol


   Last Admin: 08/18/17 22:45 Dose:  167 mls/hr


Insulin Human Regular (Humulin R Med)  0 units SC ACHS [facility]


   PRN Reason: Protocol


   Last Admin: 08/18/17 16:48 Dose:  7 units


Metformin HCl (Glucophage)  1,000 mg PO BID [facility]


   Last Admin: 08/18/17 17:02 Dose:  1,000 mg


Pantoprazole Sodium (Protonix Ec Tab)  40 mg PO 0600 [facility]


   Last Admin: 08/19/17 05:46 Dose:  40 mg


Pioglitazone HCl (Actos)  45 mg PO DAILY [facility]


   Last Admin: 08/18/17 14:20 Dose:  45 mg


Potassium Chloride (K-Dur 20 Meq Er Tab)  20 meq PO DAILY [facility]


   Last Admin: 08/18/17 12:57 Dose:  20 meq


Ramipril (Altace)  10 mg PO DAILY [facility]


   Last Admin: 08/18/17 12:56 Dose:  10 mg











- Labs


Labs: 


 











PT  11.0 Seconds (9.9-11.8)   08/18/17  09:15    


 


INR  1.02  (0.93-1.08)   08/18/17  09:15    


 


APTT  29.8 Seconds (23.7-30.8)   08/18/17  09:15    














- Constitutional


Appears: Well





- Head Exam


Head Exam: ATRAUMATIC, NORMAL INSPECTION, NORMOCEPHALIC





- Eye Exam


Eye Exam: Normal appearance





- ENT Exam


ENT Exam: Mucous Membranes Moist, Normal Exam





- Neck Exam


Neck Exam: Normal Inspection





- Respiratory Exam


Respiratory Exam: Clear to Ausculation Bilateral, NORMAL BREATHING PATTERN





- Cardiovascular Exam


Cardiovascular Exam: REGULAR RHYTHM, +S1, +S2





- Extremities Exam


Additional comments: 


B/L lymphedema and chronic venous stasis








Assessment and Plan





- Assessment and Plan (Free Text)


Assessment: 


1.57 year old man with B/L lymphedema and chronic venous stasis





-Abdominal binders in place


-Continue to ACE bandage both legs


-Podiatry on board


-No surgical intervention at the moment; Surgical consult as needed





Andre Linda D.O. PGY1

## 2017-08-19 NOTE — PN
SUBJECTIVE:  The patient is seen on the floor.  He is readmitted, I

believe, for an ulcer on the lower leg.  He is being followed by Dr. Ramirez as she has been following him for years in the clinic.  The upper

thighs are still swollen and being treated with bilateral abdominal

binders.  It sounds silly, but it works.  The only thing I have that would

squeeze some of the edema out.  We will follow with you but without

surgical intention.  The small ulcers are being treated appropriately.







__________________________________________

Amor Marshall MD





DD:  08/19/2017 12:15:44

DT:  08/19/2017 12:54:14

Job # [id number]

## 2017-08-19 NOTE — PN
ADDENDUM



SUBJECTIVE:  The patient is a 57-year-old, morbidly obese, with history of

multiple admission, was being [facility] by Dr. Merritt

and his team.  He has bilateral leg cellulitis, right leg ulcer is deeper

than the left.  The patient states his blood sugar was fluctuating high and

he has left groin pain, but currently no nausea, vomiting, no diarrhea.  No

history of fever or chills.



PAST MEDICAL HISTORY:  Significant for:

1.  Chronic atrial fibrillation.

2.  Chronic bilateral venous stasis and crusting ulcers on both legs.

3.  Morbid obesity.

4.  Recurrent UTI.

5.  Recurrent wound infection.

6.  Non-insulin dependent diabetes.

7.  History of depression.

8.  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



ALLERGIES:  NOT ALLERGIC TO ANY MEDICATION.



MEDICATION AT HOME:  He is on insulin KwikPen, iron supplementation,

omeprazole, Eliquis 2.5 twice a day, ramipril 10 mg daily, glipizide 10 mg

twice a day, potassium 20 mEq daily, pioglitazone 45 mg daily, gabapentin

400 three times a day, metformin 1000 twice a day, diltiazem 120 daily,

Lasix 20 mg daily, atorvastatin 10 mg daily.



Physical examination, assessment and plan refer to today's progress note

dictated earlier.







__________________________________________

Yolanda Conroy MD





DD:  08/19/2017 11:35:48

DT:  08/19/2017 21:27:55

Job # [id number]

## 2017-08-20 VITALS — OXYGEN SATURATION: 96 %

## 2017-08-20 RX ADMIN — CEFEPIME SCH MLS/HR: 1 INJECTION, SOLUTION INTRAVENOUS at 13:56

## 2017-08-20 RX ADMIN — VANCOMYCIN HYDROCHLORIDE SCH MLS/HR: 1 INJECTION, POWDER, LYOPHILIZED, FOR SOLUTION INTRAVENOUS at 09:55

## 2017-08-20 RX ADMIN — PANTOPRAZOLE SODIUM SCH MG: 40 TABLET, DELAYED RELEASE ORAL at 05:13

## 2017-08-20 RX ADMIN — INSULIN HUMAN SCH UNITS: 100 INJECTION, SOLUTION PARENTERAL at 17:02

## 2017-08-20 RX ADMIN — CEFEPIME SCH MLS/HR: 1 INJECTION, SOLUTION INTRAVENOUS at 21:11

## 2017-08-20 RX ADMIN — POTASSIUM CHLORIDE SCH MEQ: 20 TABLET, EXTENDED RELEASE ORAL at 09:56

## 2017-08-20 RX ADMIN — CLOTRIMAZOLE AND BETAMETHASONE DIPROPIONATE SCH APPLIC: 10; .5 LOTION TOPICAL at 09:57

## 2017-08-20 RX ADMIN — DILTIAZEM HYDROCHLORIDE SCH MG: 120 CAPSULE, COATED, EXTENDED RELEASE ORAL at 09:56

## 2017-08-20 RX ADMIN — CEFEPIME SCH MLS/HR: 1 INJECTION, SOLUTION INTRAVENOUS at 05:13

## 2017-08-20 RX ADMIN — INSULIN HUMAN SCH UNITS: 100 INJECTION, SOLUTION PARENTERAL at 11:55

## 2017-08-20 RX ADMIN — INSULIN HUMAN SCH UNITS: 100 INJECTION, SOLUTION PARENTERAL at 08:09

## 2017-08-20 RX ADMIN — INSULIN HUMAN SCH: 100 INJECTION, SOLUTION PARENTERAL at 04:38

## 2017-08-20 RX ADMIN — VANCOMYCIN HYDROCHLORIDE SCH MLS/HR: 1 INJECTION, POWDER, LYOPHILIZED, FOR SOLUTION INTRAVENOUS at 22:32

## 2017-08-20 NOTE — PN
DATE:



SUBJECTIVE:  The patient is 57 years old, seen and examined, sitting in

chair, enjoying his lunch.  The patient had complained of left flank pain

and he passed a small stone last night.



PHYSICAL EXAMINATION:

VITAL SIGNS:  The patient is afebrile, pulse 70, respiration 18, blood

pressure 132/55.

LUNGS:  Bilateral fair airflow.  No rhonchi or crackles.

HEART:  S1 and S2 audible.

ABDOMEN:  Soft, obese, nontender.  No rebound, no guarding.

NEUROLOGIC:  He is awake, alert and able to communicate.

EXTREMITIES:  Bilateral legs, he has scaly, crusty ulcers and chronic

stasis dermatitis.



LABORATORY DATA:  Blood sugar 301.  Blood cultures and urine cultures are

negative.



ASSESSMENT AND PLAN:

1.  Left nephrolithiasis, doubt gallstone, he seems to be comfortable now.

2.  Morbid obesity.

3.  Bilateral chronic venous stasis dermatitis and chronic ulcers.

4.  Hypertension.

5.  Mentally challenged.



CAT scan showed 5 mm stone in the proximal ureter that he has passed

already.  So, plan is currently, the patient is on his oral hypoglycemics. 

He is on Eliquis.  He is on cefepime, analgesic as needed.   *------*

will follow up this patient in a.m.





__________________________________________

Yolanda Conroy MD



DD:  08/20/2017 12:49:23

DT:  08/20/2017 15:36:09

Job # [id number]

## 2017-08-20 NOTE — PN
SUBJECTIVE:  The patient is in bed, in no acute distress, nontoxic.  The

patient seen earlier this morning.



PHYSICAL EXAMINATION

VITAL SIGNS:  Temperature is 97, blood pressure is 130/50, respiratory rate

of 18.

HEENT:  Unremarkable.

NECK:  Supple.

LUNGS:  Decreased breath sounds.

HEART:  Normal S1 and S2.

ABDOMEN:  Soft.



LABORATORY DATA:  Reveals a white count of 14,400, hemoglobin of 14, and

creatinine is 1.0.  Urinalysis is noted and microbiology reveals blood

cultures are negative.  Urine cultures have no growth and review of

medication reveals the patient to be on vancomycin and cefepime.



ASSESSMENT AND PLAN:  A 57-year-old morbidly obese male with history of

bilateral lower extremity cellulitis secondary to chronic lymphedema of the

lower extremities and elephantitis and morbid obesity with BMI of 52,

history of learning disability, mental retardation, and presenting with

systemic inflammatory response syndrome and bilateral lower extremity

ulcers.  We will continue the present course pending pancultures and

clinical response and this patient with CAT scan finding of the abdomen and

pelvis since the patient was initially admitted with abdominal pain reveals

kidney stones on CAT scan.  We will follow with you.







__________________________________________

Gilbert Villegas MD







DD:  08/20/2017 12:27:48

DT:  08/20/2017 14:18:10

Job # [id number]

## 2017-08-20 NOTE — CON
DATE:  08/19/2017



LOCATION:  The patient in [facility], bed #1.



CHIEF COMPLAINT:  Lower extremity infection times several days.



HISTORY OF PRESENT ILLNESS:  This is a 57-year-old male with morbid obesity

with a BMI of 52 and history of bilateral lower extremity cellulitis in the

past secondary to chronic lymphedema of lower extremities, morbid obesity,

hypertension, diabetes, history of Streptococcus pneumoniae bacteremia, and

history of learning disability, who is admitted with a diagnosis of

cellulitis, renal stones, and hyperglycemia.  The patient was seen in the

emergency room by Dr. Keo Grullon and has a history of atrial

fibrillation, on Eliquis.  The patient is admitted because of the abdominal

pain and elevated glucose and a history of congestive heart failure and

atrial fibrillation.  The patient was found to have low-grade fevers in the

emergency room and infectious disease consultation requested.  No nausea or

vomiting at this point.  No chest pain, shortness of breath, or cough.



PAST MEDICAL HISTORY:  Significant for morbid obesity with a BMI of 52,

bilateral lower extremity cellulitis, chronic lymphedema and elephantiasis

of the lower extremities, hypertension, diabetes, history of learning

disability, history of atrial fibrillation, congestive heart failure, GERD,

kidney stones, also history of mental retardation, developmental delay.



PAST SURGICAL HISTORY:  Significant for vein stripping in 2001.



ALLERGIES:  THE PATIENT HAS NO KNOWN ALLERGIES.



MEDICATIONS AT HOME:  Reviewed and include Eliquis, Altace, glipizide,

potassium, Neurontin, and metformin.



PHYSICAL EXAMINATION

VITAL SIGNS:  The patient is in bed, appearing comfortable with a

temperature of 99.4, pulse of 98, respiratory rate of 20, and blood

pressure is 140/80.

HEENT:  Unremarkable.

NECK:  Supple.

LUNGS:  Decreased breath sounds.

HEART:  Normal S1 and S2.

ABDOMEN:  Soft and nontender.  No rebound or guarding.



LABORATORY DATA:  Reveals a white count of 14,400; hemoglobin of 14; and

platelets of 269.  Coagulation is noted.  Chemistries reveal the BUN of 20,

creatinine of 1.0 and glucose is 306, .  Urinalysis reveals negative wbc's

and small blood.



Imaging reveals the patient had a CAT scan of the abdomen and pelvis which

reveals ureteral stones seen on left, a 5 mm distal ureteral stone at the

UV junction and a 5 to 6 mm stone in the proximal ureter.  There is also

left-sided nephrolithiasis.  Only 5 to 10 wbc's in the urinalysis are

noted.



Dr. Danae Iyer' note is reviewed, chronic lower edema and ulceration. 

Dr. Andre Linda' note is reviewed and Dr. Andre Linda' consultation

is reviewed.



ASSESSMENT AND PLAN:  He is a 57-year-old male with mental retardation,

developmentally delayed, and learning disability, with history of morbid

obesity with a body mass index of 52 and bilateral lower extremity

lymphedema and elephantitis and chronic lymphedema and ulcers and history

of hypertension and diabetes mellitus, history of Streptococcus pneumoniae

bacteremia, atrial fibrillation, congestive heart failure, and renal

stones, admitted now with low-grade fevers and white count of 14,400, with

negative blood cultures and negative urine cultures.

1.  Systemic inflammatory response syndrome with renal stone and lower

extremity ulcers.  We will treat the patient with cefepime pending final

urine culture and he should have urology evaluation regarding the stones. 

I will follow closely with you.  If all cultures are negative, we will

discontinue the antibiotics.







__________________________________________

Gilbert Villegas MD





DD:  08/19/2017 15:35:14

DT:  08/20/2017 0:44:31

Job # [id number]

## 2017-08-20 NOTE — CP.PCM.PN
<Danae Iyer - Last Filed: 08/20/17 14:06>





Subjective





- Date & Time of Evaluation


Date of Evaluation: 08/20/17


Time of Evaluation: 14:06





- Subjective


Subjective: 


Podiatry progress note for Dr. Hobbs





57 year old male was seen today regarding right lower extremity ulcerations and 

bilateral lower extremity chronic edema. Patient is resting comfortably in bed. 

Bilateral lower extremity bandages are clean dry and intact. No new complaints 

at this time. Patient denies any n/v/f/c/sob/cp. 





Objective





- Vital Signs/Intake and Output


Vital Signs (last 24 hours): 


 











Temp Pulse Resp BP Pulse Ox


 


 97.6 F   70   18   132/55 L  96 


 


 08/20/17 08:16  08/20/17 09:56  08/20/17 08:16  08/20/17 09:56  08/20/17 08:16








Intake and Output: 


 











 08/20/17 08/20/17





 06:59 18:59


 


Intake Total 240 


 


Output Total 1400 


 


Balance -1160 














- Medications


Medications: 


 Current Medications





Apixaban (Eliquis)  2.5 mg PO BID [facility]


   PRN Reason: Protocol


   Last Admin: 08/20/17 09:55 Dose:  2.5 mg


Atorvastatin Calcium (Lipitor)  10 mg PO DAILY [facility]


   Last Admin: 08/20/17 09:55 Dose:  10 mg


Betamethasone/Clotrimazole (Lotrisone)  0 ml TOP DAILY [facility]


   Last Admin: 08/20/17 09:57 Dose:  1 applic


Diltiazem HCl (Cardizem Cd)  120 mg PO DAILY [facility]


   Last Admin: 08/20/17 09:56 Dose:  120 mg


Furosemide (Lasix)  20 mg PO DAILY [facility]


   Last Admin: 08/20/17 09:56 Dose:  20 mg


Gabapentin (Neurontin)  400 mg PO TID [facility]


   PRN Reason: Protocol


   Last Admin: 08/20/17 13:56 Dose:  400 mg


Glipizide (Glucotrol)  10 mg PO BID [facility]


   Last Admin: 08/20/17 09:56 Dose:  10 mg


Cefepime HCl (Maxipime 1gm)  1 gm in 100 mls @ 100 mls/hr IVPB Q8 [facility]


   PRN Reason: Protocol


   Last Admin: 08/20/17 13:56 Dose:  100 mls/hr


Vancomycin HCl (Vancomycin 1gm)  1 gm in 250 mls @ 167 mls/hr IVPB Q12 [facility]


   PRN Reason: Protocol


   Last Admin: 08/20/17 09:55 Dose:  167 mls/hr


Insulin Human Regular (Humulin R Med)  0 units SC ACHS [facility]


   PRN Reason: Protocol


   Last Admin: 08/20/17 11:55 Dose:  7 units


Metformin HCl (Glucophage)  1,000 mg PO BID [facility]


   Last Admin: 08/20/17 09:55 Dose:  1,000 mg


Oxycodone/Acetaminophen (Percocet 5/325 Mg Tab)  1 tab PO Q4H PRN


   PRN Reason: Pain, moderate (4-7)


   Stop: 08/22/17 10:24


   Last Admin: 08/19/17 10:50 Dose:  1 tab


Pantoprazole Sodium (Protonix Ec Tab)  40 mg PO 0600 [facility]


   Last Admin: 08/20/17 05:13 Dose:  40 mg


Pioglitazone HCl (Actos)  45 mg PO DAILY [facility]


   Last Admin: 08/20/17 09:55 Dose:  45 mg


Potassium Chloride (K-Dur 20 Meq Er Tab)  20 meq PO DAILY [facility]


   Last Admin: 08/20/17 09:56 Dose:  20 meq


Ramipril (Altace)  10 mg PO DAILY [facility]


   Last Admin: 08/20/17 09:55 Dose:  10 mg











- Labs


Labs: 


 











PT  11.0 Seconds (9.9-11.8)   08/18/17  09:15    


 


INR  1.02  (0.93-1.08)   08/18/17  09:15    


 


APTT  29.8 Seconds (23.7-30.8)   08/18/17  09:15    














- Constitutional


Appears: Non-toxic, No Acute Distress





- Extremities Exam


Additional comments: 


Lower extremity focused exam:





VASC: Diffuse edema of bilateral lower extremities. Pedal pulses non-palpable 

due to edema.  Skin temperature warm to warm from proximal to distal b/l





NEURO: Gross sensation diminished b/l





DERM: Open ulceration noted to right lower extremity measuring approximately 2 

cm by 1.5 cm by 0.2 cm with granular base at the lateral aspect of the leg with 

scant amount of sanguinous drainage, no purulence, no erythema, mild malodor. 

No open ulcerations noted to the left lower extremity.  Skin is diffusely 

xerotic with hyperpigmentation. Skin changes of chronic edema noted.





ORTHO: No tenderness on palpation to the lower extremitie





- Neurological Exam


Neurological Exam: Alert, Awake, Oriented x3





- Psychiatric Exam


Psychiatric exam: Normal Affect, Normal Mood





Assessment and Plan





- Assessment and Plan (Free Text)


Assessment: 


57 year old male with diabetes and chronic lower extremity lymphedema with 

right leg ulceration


Plan: 


Patient examined and evaluated at bedside 


Discussed with attending Dr. Hobbs


chart, labs, vitals reviewed- afebrile,WBC 


Right leg wound culture pending


Lotrisone applied to legs b/l


Right LE dressed with xeroform, 4x4, ABD, ACE


Left LE dressed with ACE


Patient to continue IV abx per ID


Podiatry will continue to follow


Pt will f/u in the wound care center after DC 





<Manuel Hobbs - Last Filed: 08/22/17 11:53>





Objective





- Vital Signs/Intake and Output


Vital Signs (last 24 hours): 


 











Temp Pulse Resp BP Pulse Ox


 


 98.1 F   69   20   132/68   96 


 


 08/21/17 07:34  08/21/17 10:37  08/21/17 07:34  08/21/17 10:38  08/21/17 07:34











- Labs


Labs: 


 





 08/21/17 08:49 





 08/21/17 08:49 





 











PT  11.0 Seconds (9.9-11.8)   08/18/17  09:15    


 


INR  1.02  (0.93-1.08)   08/18/17  09:15    


 


APTT  29.8 Seconds (23.7-30.8)   08/18/17  09:15    














Attending/Attestation





- Attestation


I have personally seen and examined this patient.: Yes


I have fully participated in the care of the patient.: Yes


I have reviewed all pertinent clinical information, including history, physical 

exam and plan: Yes

## 2017-08-21 ENCOUNTER — HOSPITAL ENCOUNTER (INPATIENT)
Dept: HOSPITAL 42 - TRCU | Age: 57
LOS: 5 days | Discharge: HOME HEALTH | DRG: 603 | End: 2017-08-26
Attending: INTERNAL MEDICINE | Admitting: INTERNAL MEDICINE
Payer: COMMERCIAL

## 2017-08-21 VITALS
TEMPERATURE: 98.1 F | RESPIRATION RATE: 20 BRPM | HEART RATE: 69 BPM | SYSTOLIC BLOOD PRESSURE: 132 MMHG | DIASTOLIC BLOOD PRESSURE: 68 MMHG

## 2017-08-21 VITALS — BODY MASS INDEX: 40.3 KG/M2

## 2017-08-21 DIAGNOSIS — E11.622: ICD-10-CM

## 2017-08-21 DIAGNOSIS — Z79.84: ICD-10-CM

## 2017-08-21 DIAGNOSIS — N20.0: ICD-10-CM

## 2017-08-21 DIAGNOSIS — Z79.2: ICD-10-CM

## 2017-08-21 DIAGNOSIS — E78.5: ICD-10-CM

## 2017-08-21 DIAGNOSIS — I11.0: ICD-10-CM

## 2017-08-21 DIAGNOSIS — Z79.01: ICD-10-CM

## 2017-08-21 DIAGNOSIS — B96.1: ICD-10-CM

## 2017-08-21 DIAGNOSIS — I50.9: ICD-10-CM

## 2017-08-21 DIAGNOSIS — F81.9: ICD-10-CM

## 2017-08-21 DIAGNOSIS — I89.0: ICD-10-CM

## 2017-08-21 DIAGNOSIS — B95.62: ICD-10-CM

## 2017-08-21 DIAGNOSIS — L97.919: ICD-10-CM

## 2017-08-21 DIAGNOSIS — L03.115: Primary | ICD-10-CM

## 2017-08-21 DIAGNOSIS — I48.91: ICD-10-CM

## 2017-08-21 DIAGNOSIS — E66.01: ICD-10-CM

## 2017-08-21 LAB
ALBUMIN/GLOB SERPL: 1.1 {RATIO} (ref 1.1–1.8)
ALP SERPL-CCNC: 104 U/L (ref 38–133)
ALT SERPL-CCNC: 36 U/L (ref 7–56)
AST SERPL-CCNC: 23 U/L (ref 15–59)
BASOPHILS # BLD AUTO: 0.02 K/MM3 (ref 0–2)
BASOPHILS NFR BLD: 0.2 % (ref 0–3)
BILIRUB SERPL-MCNC: 0.5 MG/DL (ref 0.2–1.3)
BUN SERPL-MCNC: 15 MG/DL (ref 7–21)
CALCIUM SERPL-MCNC: 9.2 MG/DL (ref 8.4–10.5)
CHLORIDE SERPL-SCNC: 101 MMOL/L (ref 98–107)
CO2 SERPL-SCNC: 26 MMOL/L (ref 21–33)
EOSINOPHIL # BLD: 0.3 10*3/UL (ref 0–0.7)
EOSINOPHIL NFR BLD: 3.9 % (ref 1.5–5)
ERYTHROCYTE [DISTWIDTH] IN BLOOD BY AUTOMATED COUNT: 13 % (ref 11.5–14.5)
GLOBULIN SER-MCNC: 3.3 GM/DL
GLUCOSE SERPL-MCNC: 232 MG/DL (ref 70–110)
GRANULOCYTES # BLD: 6.32 10*3/UL (ref 1.4–6.5)
GRANULOCYTES NFR BLD: 74.9 % (ref 50–68)
HCT VFR BLD CALC: 40.6 % (ref 42–52)
LYMPHOCYTES # BLD: 1.1 10*3/UL (ref 1.2–3.4)
LYMPHOCYTES NFR BLD AUTO: 12.5 % (ref 22–35)
MCH RBC QN AUTO: 29.3 PG (ref 25–35)
MCHC RBC AUTO-ENTMCNC: 32.5 G/DL (ref 31–37)
MCV RBC AUTO: 90.2 FL (ref 80–105)
MONOCYTES # BLD AUTO: 0.7 10*3/UL (ref 0.1–0.6)
MONOCYTES NFR BLD: 8.5 % (ref 1–6)
PLATELET # BLD: 243 10^3/UL (ref 120–450)
PMV BLD AUTO: 9.6 FL (ref 7–11)
POTASSIUM SERPL-SCNC: 4.6 MMOL/L (ref 3.6–5)
PROT SERPL-MCNC: 6.8 G/DL (ref 5.8–8.3)
SODIUM SERPL-SCNC: 138 MMOL/L (ref 132–148)
WBC # BLD AUTO: 8.5 10^3/UL (ref 4.5–11)

## 2017-08-21 PROCEDURE — 3E03329 INTRODUCTION OF OTHER ANTI-INFECTIVE INTO PERIPHERAL VEIN, PERCUTANEOUS APPROACH: ICD-10-PCS | Performed by: INTERNAL MEDICINE

## 2017-08-21 RX ADMIN — VANCOMYCIN HYDROCHLORIDE SCH MLS/HR: 1 INJECTION, POWDER, LYOPHILIZED, FOR SOLUTION INTRAVENOUS at 10:40

## 2017-08-21 RX ADMIN — INSULIN HUMAN SCH UNITS: 100 INJECTION, SOLUTION PARENTERAL at 08:13

## 2017-08-21 RX ADMIN — CLOTRIMAZOLE AND BETAMETHASONE DIPROPIONATE SCH DOSE: 10; .5 CREAM TOPICAL at 18:10

## 2017-08-21 RX ADMIN — DILTIAZEM HYDROCHLORIDE SCH MG: 120 CAPSULE, COATED, EXTENDED RELEASE ORAL at 10:37

## 2017-08-21 RX ADMIN — PANTOPRAZOLE SODIUM SCH MG: 40 TABLET, DELAYED RELEASE ORAL at 05:18

## 2017-08-21 RX ADMIN — INSULIN HUMAN SCH UNITS: 100 INJECTION, SOLUTION PARENTERAL at 08:09

## 2017-08-21 RX ADMIN — VANCOMYCIN HYDROCHLORIDE SCH MLS/HR: 1 INJECTION, POWDER, LYOPHILIZED, FOR SOLUTION INTRAVENOUS at 18:00

## 2017-08-21 RX ADMIN — CLOTRIMAZOLE AND BETAMETHASONE DIPROPIONATE SCH APPLIC: 10; .5 LOTION TOPICAL at 10:40

## 2017-08-21 RX ADMIN — INSULIN HUMAN SCH: 100 INJECTION, SOLUTION PARENTERAL at 22:22

## 2017-08-21 RX ADMIN — INSULIN HUMAN SCH: 100 INJECTION, SOLUTION PARENTERAL at 05:19

## 2017-08-21 RX ADMIN — CEFEPIME SCH MLS/HR: 1 INJECTION, SOLUTION INTRAVENOUS at 05:18

## 2017-08-21 RX ADMIN — CEFEPIME SCH MLS/HR: 1 INJECTION, SOLUTION INTRAVENOUS at 22:24

## 2017-08-21 RX ADMIN — CEFEPIME SCH MLS/HR: 1 INJECTION, SOLUTION INTRAVENOUS at 14:00

## 2017-08-21 RX ADMIN — POTASSIUM CHLORIDE SCH MEQ: 20 TABLET, EXTENDED RELEASE ORAL at 10:39

## 2017-08-21 RX ADMIN — INSULIN HUMAN SCH UNITS: 100 INJECTION, SOLUTION PARENTERAL at 11:48

## 2017-08-21 NOTE — CP.PCM.PN
Subjective





- Date & Time of Evaluation


Date of Evaluation: 08/21/17


Time of Evaluation: 11:35





- Subjective


Subjective: 





Comfortable on a chair, not in distress, afebrile.





Objective





- Vital Signs/Intake and Output


Vital Signs (last 24 hours): 


 











Temp Pulse Resp BP Pulse Ox


 


 98.1 F   69   20   132/68   96 


 


 08/21/17 07:34  08/21/17 07:34  08/21/17 07:34  08/21/17 07:34  08/21/17 07:34








Intake and Output: 


 











 08/21/17 08/21/17





 06:59 18:59


 


Intake Total 360 


 


Output Total 1600 


 


Balance -1240 














- Medications


Medications: 


 Current Medications





Apixaban (Eliquis)  2.5 mg PO BID [facility]


   PRN Reason: Protocol


   Last Admin: 08/20/17 18:10 Dose:  2.5 mg


Atorvastatin Calcium (Lipitor)  10 mg PO DAILY [facility]


   Last Admin: 08/20/17 09:55 Dose:  10 mg


Betamethasone/Clotrimazole (Lotrisone)  0 ml TOP DAILY [facility]


   Last Admin: 08/20/17 09:57 Dose:  1 applic


Diltiazem HCl (Cardizem Cd)  120 mg PO DAILY [facility]


   Last Admin: 08/20/17 09:56 Dose:  120 mg


Furosemide (Lasix)  20 mg PO DAILY [facility]


   Last Admin: 08/20/17 09:56 Dose:  20 mg


Gabapentin (Neurontin)  400 mg PO TID [facility]


   PRN Reason: Protocol


   Last Admin: 08/20/17 18:09 Dose:  400 mg


Glipizide (Glucotrol)  10 mg PO BID [facility]


   Last Admin: 08/20/17 18:10 Dose:  10 mg


Cefepime HCl (Maxipime 1gm)  1 gm in 100 mls @ 100 mls/hr IVPB Q8 [facility]


   PRN Reason: Protocol


   Last Admin: 08/21/17 05:18 Dose:  100 mls/hr


Vancomycin HCl (Vancomycin 1gm)  1 gm in 250 mls @ 167 mls/hr IVPB Q12 [facility]


   PRN Reason: Protocol


   Last Admin: 08/20/17 22:32 Dose:  167 mls/hr


Insulin Human Regular (Humulin R Med)  0 units SC ACHS [facility]


   PRN Reason: Protocol


   Last Admin: 08/21/17 08:13 Dose:  1 units


Metformin HCl (Glucophage)  1,000 mg PO BID [facility]


   Last Admin: 08/20/17 18:10 Dose:  1,000 mg


Oxycodone/Acetaminophen (Percocet 5/325 Mg Tab)  1 tab PO Q4H PRN


   PRN Reason: Pain, moderate (4-7)


   Stop: 08/22/17 10:24


   Last Admin: 08/19/17 10:50 Dose:  1 tab


Pantoprazole Sodium (Protonix Ec Tab)  40 mg PO 0600 [facility]


   Last Admin: 08/21/17 05:18 Dose:  40 mg


Pioglitazone HCl (Actos)  45 mg PO DAILY [facility]


   Last Admin: 08/20/17 09:55 Dose:  45 mg


Potassium Chloride (K-Dur 20 Meq Er Tab)  20 meq PO DAILY [facility]


   Last Admin: 08/20/17 09:56 Dose:  20 meq


Ramipril (Altace)  10 mg PO DAILY [facility]


   Last Admin: 08/20/17 09:55 Dose:  10 mg











- Labs


Labs: 


 





 08/21/17 08:49 





 08/21/17 08:49 





 











PT  11.0 Seconds (9.9-11.8)   08/18/17  09:15    


 


INR  1.02  (0.93-1.08)   08/18/17  09:15    


 


APTT  29.8 Seconds (23.7-30.8)   08/18/17  09:15    














- Constitutional


Appears: Non-toxic, No Acute Distress





- Head Exam


Head Exam: NORMAL INSPECTION





- ENT Exam


ENT Exam: Mucous Membranes Moist





- Neck Exam


Neck Exam: absent: Meningismus





- Respiratory Exam


Respiratory Exam: Decreased Breath Sounds





- Cardiovascular Exam


Cardiovascular Exam: +S1, +S2





- GI/Abdominal Exam


GI & Abdominal Exam: Soft.  absent: Tenderness





- Extremities Exam


Additional comments: 





both lower extremities with bandages in place





Assessment and Plan





- Assessment and Plan (Free Text)


Plan: 





Assessment


consider right leg skin and skin structure infection associated with chronic 

leg ulcers, growing gram positive cocci and gram negative bacilli


history of sepsis secondary to right leg skin/skin structure infection with 

chronic leg ulcers, growing MRSA and sensitive Klebsiella 


history of MRSA cellulitis Sept. 2016


history of Bilateral lower extremity skin and skin structure infection (

Enterobacter, Klebsiella Oxytoca and Group B Strep, as well as MRSA) which was 

treated 9/2015; MRSA and Pseudomonas cellulitis of left leg in Dec 2015, May 

and June 2016


chronic lymphedema of the lower extremities


Morbid obesity with BMI 55


HTN


DM


history of Strep bacteremia


history of hernia surgery


Learning disability





Plan


continue Vancomycin and Cefepime (day 3) pending identification and 

sensitivities of the gram positive cocci and gram negative bacilli in the wound 

cx


will monitor clinically

## 2017-08-21 NOTE — CP.PCM.PN
<HastingsBrenna - Last Filed: 08/21/17 10:12>





Subjective





- Date & Time of Evaluation


Date of Evaluation: 08/21/17


Time of Evaluation: 10:12





- Subjective


Subjective: 





Podiatry progress note for Dr. Ramirez





57 year old male was seen today regarding ulcerations to right anterior leg and 

chronic bilateral lower extremity edema. Patient is resting comfortably in bed. 

Bilateral lower extremity bandages are clean dry and intact. No new complaints 

at this time. Patient denies any n/v/f/c/sob/cp. 





Objective





- Vital Signs/Intake and Output


Vital Signs (last 24 hours): 


 











Temp Pulse Resp BP Pulse Ox


 


 98.1 F   69   20   132/68   96 


 


 08/21/17 07:34  08/21/17 07:34  08/21/17 07:34  08/21/17 07:34  08/21/17 07:34








Intake and Output: 


 











 08/21/17 08/21/17





 06:59 18:59


 


Intake Total 360 


 


Output Total 1600 


 


Balance -1240 














- Medications


Medications: 


 Current Medications





Apixaban (Eliquis)  2.5 mg PO BID [facility]


   PRN Reason: Protocol


   Last Admin: 08/20/17 18:10 Dose:  2.5 mg


Atorvastatin Calcium (Lipitor)  10 mg PO DAILY [facility]


   Last Admin: 08/20/17 09:55 Dose:  10 mg


Betamethasone/Clotrimazole (Lotrisone)  0 ml TOP DAILY [facility]


   Last Admin: 08/20/17 09:57 Dose:  1 applic


Diltiazem HCl (Cardizem Cd)  120 mg PO DAILY [facility]


   Last Admin: 08/20/17 09:56 Dose:  120 mg


Furosemide (Lasix)  20 mg PO DAILY [facility]


   Last Admin: 08/20/17 09:56 Dose:  20 mg


Gabapentin (Neurontin)  400 mg PO TID [facility]


   PRN Reason: Protocol


   Last Admin: 08/20/17 18:09 Dose:  400 mg


Glipizide (Glucotrol)  10 mg PO BID [facility]


   Last Admin: 08/20/17 18:10 Dose:  10 mg


Cefepime HCl (Maxipime 1gm)  1 gm in 100 mls @ 100 mls/hr IVPB Q8 [facility]


   PRN Reason: Protocol


   Last Admin: 08/21/17 05:18 Dose:  100 mls/hr


Vancomycin HCl (Vancomycin 1gm)  1 gm in 250 mls @ 167 mls/hr IVPB Q12 [facility]


   PRN Reason: Protocol


   Last Admin: 08/20/17 22:32 Dose:  167 mls/hr


Insulin Human Regular (Humulin R Med)  0 units SC ACHS [facility]


   PRN Reason: Protocol


   Last Admin: 08/21/17 08:13 Dose:  1 units


Metformin HCl (Glucophage)  1,000 mg PO BID [facility]


   Last Admin: 08/20/17 18:10 Dose:  1,000 mg


Oxycodone/Acetaminophen (Percocet 5/325 Mg Tab)  1 tab PO Q4H PRN


   PRN Reason: Pain, moderate (4-7)


   Stop: 08/22/17 10:24


   Last Admin: 08/19/17 10:50 Dose:  1 tab


Pantoprazole Sodium (Protonix Ec Tab)  40 mg PO 0600 [facility]


   Last Admin: 08/21/17 05:18 Dose:  40 mg


Pioglitazone HCl (Actos)  45 mg PO DAILY [facility]


   Last Admin: 08/20/17 09:55 Dose:  45 mg


Potassium Chloride (K-Dur 20 Meq Er Tab)  20 meq PO DAILY [facility]


   Last Admin: 08/20/17 09:56 Dose:  20 meq


Ramipril (Altace)  10 mg PO DAILY [facility]


   Last Admin: 08/20/17 09:55 Dose:  10 mg











- Labs


Labs: 


 





 08/21/17 08:49 





 08/21/17 08:49 





 











PT  11.0 Seconds (9.9-11.8)   08/18/17  09:15    


 


INR  1.02  (0.93-1.08)   08/18/17  09:15    


 


APTT  29.8 Seconds (23.7-30.8)   08/18/17  09:15    














- Constitutional


Appears: Well, Non-toxic, No Acute Distress





- Extremities Exam


Additional comments: 





Right Lower extremity focused exam:





DERM: Open ulceration noted to right lower extremity measuring approximately 2 

cm by 1.5 cm by 0.2 cm with granular base at the anterior aspect of the leg 

with scant amount of sanguinous drainage, no purulence, no erythema, mild 

malodor. No sign of acute infection is noted





No open ulcerations noted to the left lower extremity.  Skin is diffusely 

xerotic with hyperpigmentation. Skin changes of chronic edema noted.





VASC: Diffuse edema of bilateral lower extremities. Pedal pulses non-palpable 

due to edema.  Skin temperature warm to warm from proximal to distal b/l





NEURO: Gross sensation diminished b/l





ORTHO: No tenderness on palpation to the lower extremities





- Neurological Exam


Neurological Exam: Alert, Awake, Oriented x3





- Psychiatric Exam


Psychiatric exam: Normal Affect, Normal Mood





- Skin


Skin Exam: Normal Color, Warm





Assessment and Plan





- Assessment and Plan (Free Text)


Assessment: 





57 year old male with diabetes and chronic lower extremity lymphedema with 

right leg ulceration


Plan: 





Patient examined and evaluated at bedside 


Discussed with attending Dr. Ramirez


chart, labs, vitals reviewed- afebrile, no leukocytosis


Right leg wound culture; Gram (-) Seferino, Gram (+) Cocci Preliminary


Lotrisone applied to legs b/l


Right LE dressed with xeroform, 4x4, ABD, ACE


Left LE dressed with ACE


Patient to continue IV abx per ID


Podiatry will continue to follow


Pt will f/u in the wound care center after DC 








<Leah Ramirez - Last Filed: 09/03/17 19:24>





Objective





- Vital Signs/Intake and Output


Vital Signs (last 24 hours): 


 











Temp Pulse Resp BP Pulse Ox


 


 98.1 F   69   20   132/68   96 


 


 08/21/17 07:34  08/21/17 10:37  08/21/17 07:34  08/21/17 10:38  08/21/17 07:34











- Labs


Labs: 


 





 08/21/17 08:49 





 08/21/17 08:49 





 











PT  11.0 Seconds (9.9-11.8)   08/18/17  09:15    


 


INR  1.02  (0.93-1.08)   08/18/17  09:15    


 


APTT  29.8 Seconds (23.7-30.8)   08/18/17  09:15    














Attending/Attestation





- Attestation


I have personally seen and examined this patient.: Yes


I have fully participated in the care of the patient.: Yes


I have reviewed all pertinent clinical information, including history, physical 

exam and plan: Yes

## 2017-08-22 PROCEDURE — F08Z4ZZ HOME MANAGEMENT TREATMENT: ICD-10-PCS | Performed by: INTERNAL MEDICINE

## 2017-08-22 RX ADMIN — CEFEPIME SCH MLS/HR: 1 INJECTION, SOLUTION INTRAVENOUS at 13:17

## 2017-08-22 RX ADMIN — PANTOPRAZOLE SODIUM SCH MG: 40 TABLET, DELAYED RELEASE ORAL at 06:13

## 2017-08-22 RX ADMIN — POTASSIUM CHLORIDE SCH MEQ: 20 TABLET, EXTENDED RELEASE ORAL at 09:46

## 2017-08-22 RX ADMIN — INSULIN HUMAN SCH: 100 INJECTION, SOLUTION PARENTERAL at 17:58

## 2017-08-22 RX ADMIN — VANCOMYCIN HYDROCHLORIDE SCH MLS/HR: 1 INJECTION, POWDER, LYOPHILIZED, FOR SOLUTION INTRAVENOUS at 04:38

## 2017-08-22 RX ADMIN — CEFEPIME SCH MLS/HR: 1 INJECTION, SOLUTION INTRAVENOUS at 06:14

## 2017-08-22 RX ADMIN — CEFEPIME SCH MLS/HR: 1 INJECTION, SOLUTION INTRAVENOUS at 22:38

## 2017-08-22 RX ADMIN — CLOTRIMAZOLE AND BETAMETHASONE DIPROPIONATE SCH DOSE: 10; .5 CREAM TOPICAL at 19:00

## 2017-08-22 RX ADMIN — VANCOMYCIN HYDROCHLORIDE SCH MLS/HR: 1 INJECTION, POWDER, LYOPHILIZED, FOR SOLUTION INTRAVENOUS at 17:56

## 2017-08-22 RX ADMIN — CLOTRIMAZOLE AND BETAMETHASONE DIPROPIONATE SCH DOSE: 10; .5 CREAM TOPICAL at 09:47

## 2017-08-22 RX ADMIN — INSULIN HUMAN SCH UNITS: 100 INJECTION, SOLUTION PARENTERAL at 07:20

## 2017-08-22 RX ADMIN — DILTIAZEM HYDROCHLORIDE SCH MG: 120 CAPSULE, COATED, EXTENDED RELEASE ORAL at 09:44

## 2017-08-22 RX ADMIN — INSULIN HUMAN SCH: 100 INJECTION, SOLUTION PARENTERAL at 22:38

## 2017-08-22 RX ADMIN — INSULIN HUMAN SCH UNITS: 100 INJECTION, SOLUTION PARENTERAL at 12:49

## 2017-08-22 NOTE — PN
HISTORY OF PRESENT ILLNESS:  The patient is a 57-year-old male, who came

into the hospital because of cellulitis in the legs.  He also was found to

have left nephrolithiasis.  He had passed his renal stone according to the

patient's nurse.  He has no complaints of any chest pain or shortness of

breath.  He has no headaches or dizziness.  No nausea, no vomiting.



PHYSICAL EXAMINATION:

VITAL SIGNS:  Temperature is 98.1, pulse is 69, blood pressure 132/60,

respirations 20, and O2 saturations 96%.

GENERAL:  The patient is lying in bed, flat, comfortable.

HEENT:  No oral lesion.  Anicteric sclerae.  Moist mucosa.

NECK:  No JVD, adenopathy, or thyromegaly.

CARDIOVASCULAR:  S1 and S2, regular.  No murmurs, rubs, or gallops.

LUNGS:  Clear to auscultation bilaterally.  No wheeze, rales, or rhonchi.

ABDOMEN:  Bowel sounds are positive, soft, nontender and nondistended.

EXTREMITIES:  Right lower extremity, bilateral chronic skin changes.



ASSESSMENT:

1.  Left ureteral stone 5 mm.

2.  Hypertension.

3.  Morbid obesity with a BMI of 40.

4.  Atrial fibrillation, on anticoagulation.

5.  Dyslipidemia.

6.  Diabetes type 2.



PLAN:  The patient is on Actos for his diabetes, this will be continued. 

He is on Altace for his hypertension.  He is on Eliquis for his

anticoagulation.  The patient is on metformin for his diabetes as well.  He

is receiving Lasix daily.  The patient is on Neurontin for neuropathy.  He

is on Protonix for pain.  He is receiving antibiotics with vancomycin by

infectious disease.  We will have to see if the patient qualifies for the

transitional care unit.





__________________________________________

Delbert Corona MD





DD:  08/21/2017 8:42:49

DT:  08/22/2017 2:10:00

Job # [id number]

## 2017-08-22 NOTE — PN
DATE:  08/22/2017



SUBJECTIVE:  The patient has no complaints of any chest pain, shortness of

breath, headaches or dizziness.



PHYSICAL EXAMINATION

VITAL SIGNS:  Temperature is 98.1, pulse is 75, blood pressure 110/60 and

respirations 18.

GENERAL:  The patient is lying in bed, flat, comfortable.

HEENT:  No oral lesion.  Anicteric sclerae.  Moist mucosa.

NECK:  No JVD, adenopathy, or thyromegaly.

CARDIOVASCULAR:  S1 and S2, regular.  No murmurs, rubs, or gallops.

LUNGS:  Clear to auscultation bilaterally.  No wheeze, rales, or rhonchi.

ABDOMEN:  Bowel sounds are positive, soft, nontender and nondistended.

EXTREMITIES:  Chronic lower extremity skin changes.



ASSESSMENT:

1.  Nephrolithiasis.

2.  Cellulitis.

3.  Obesity with a BMI of 40.

4.  Atrial fibrillation, on anticoagulation.

5.  Hypertension.

6.  Dyslipidemia.



PLAN:  The patient is currently comfortable.  He has been brought into the

transitional care unit for rehab as well as for IV antibiotics.  He is on

Actos for his diabetes.  We want to continue the Eliquis for his

anticoagulation.  The patient is on metformin for his diabetes as well as

Glucotrol.  He is on Lipitor for his dyslipidemia.  He is on antibiotics,

cefepime and vancomycin.  He is on heart-healthy diet.  I did review the

initial H and P and I do agree with it.







__________________________________________

Delbert Corona MD





DD:  08/22/2017 6:07:47

DT:  08/22/2017 6:57:17

Job # [id number]

## 2017-08-22 NOTE — CP.PCM.CON
History of Present Illness





- History of Present Illness


History of Present Illness: 


57 year old male with PMH of right leg skin/skin structure infection with 

chronic leg ulcers, growing MRSA and sensitive Klebsiella in 2016, Bilateral 

lower extremity skin and skin structure infection (Enterobacter, Klebsiella 

Oxytoca and Group B Strep, as well as MRSA) which was treated 9/2015; MRSA and 

Pseudomonas cellulitis of left leg in Dec 2015, May and June 2016, and MRSA 

cellulitis in Sept. 2016, chronic lymphedema of the lower extremities, Morbid 

obesity with BMI 53, HTN, DM, history of Strep bacteremia, history of hernia 

surgery, Learning disability was initially admitted in [facility] 

because of leg swelling and oozing ulcers. He is being treated for right leg 

cellulitis and infected ulcers. He is doing well and is now transferred to [facility] 

for continued medical therapy and physical rehab. Infectious Diseases consult 

is requested to continue his antibiotics.





Review of Systems





- Review of Systems


Systems not reviewed;Unavailable: Other (learning disability)





Past Patient History





- Infectious Disease


Hx of Infectious Diseases: None, MRSA





- Tetanus Immunizations


Tetanus Immunization: Unknown





- Past Medical History & Family History


Past Medical History?: Yes





- Past Social History


Smoking Status: Former Smoker





- CARDIAC


Hx Cardiac Disorders: Yes (Afib)


Hx Congestive Heart Failure: Yes


Hx Hypertension: Yes





- PULMONARY


Hx Chronic Obstructive Pulmonary Disease (COPD): No





- NEUROLOGICAL


Other/Comment: Epilepsy





- HEENT


Hx HEENT Problems: Yes


Other/Comment: wears glasses





- RENAL


Hx Renal Failure: No





- ENDOCRINE/METABOLIC


Hx Diabetes Mellitus Type 2: Yes





- HEMATOLOGICAL/ONCOLOGICAL


Hx Blood Disorders: No


Hx AIDS: No


Hx Anemia: No


Hx Cancer: No


Hx Chemotherapy: No


Hx Cirrhosis: No


Hx Hepatitis A: No


Hx Hepatitis B: No


Hx Hepatitis C: No


Hx Metastesis: No


Hx Shingles: No


Hx Unexplained Bleeding: No


Other/Comment: blood infection





- INTEGUMENTARY


Hx Dermatological Problems: Yes


Hx Basil Cell: No


Hx Eczema: No


Hx Melanoma: No


Hx Psoriasis: No


Hx Squamous Cell: No


Other/Comment: multiple wounds on the rt leg, cellulitis in b/l extreminity. 

Left thigh cellulitis





- MUSCULOSKELETAL/RHEUMATOLOGICAL


Hx Falls: Yes





- GASTROINTESTINAL


Hx Gastrointestinal Disorders: No


Hx Colostomy: No


Hx Crohn's Disease: No


Hx Diverticulitis: No


Hx Gall Bladder Disease: No


Hx Gastroesophageal Reflux: No


Hx Ileostomy: No


Hx Liver Failure: No


Hx Pancreatitis: No


HX Swallowing Problems: No





- GENITOURINARY/GYNECOLOGICAL


Hx Reproductive Disorders: No





- PSYCHIATRIC


Hx Psychophysiologic Disorder: Yes


Hx Substance Use: No


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





- SURGICAL HISTORY


Hx Amputation: No


Hx Appendectomy: No


Hx Cholecystectomy: No


Hx Gastric Bypass Surgery: No


Hx Hysterectomy: No


Hx Joint Replacement: No


Hx Kidney Transplant: No


Hx Liver Transplant: No


Hx Mastectomy: No


Hx Musculoskeletal Surgery: No


Hx Open Heart Surgery: No


Hx Orthopedic Surgery: No


Hx Splenectomy: No


Hx Valve Replacement: No





- ANESTHESIA


Hx Anesthesia: Yes


Hx Anesthesia Reactions: No


Hx Malignant Hyperthermia: No





Meds


Allergies/Adverse Reactions: 


 Allergies











Allergy/AdvReac Type Severity Reaction Status Date / Time


 


No Known Allergies Allergy   Verified 08/21/17 16:25














- Medications


Medications: 


 Current Medications





Apixaban (Eliquis)  2.5 mg PO BID CYNDEE


   PRN Reason: Protocol


   Last Admin: 08/21/17 18:09 Dose:  2.5 mg


Atorvastatin Calcium (Lipitor)  10 mg PO DAILY CYNDEE


   PRN Reason: Protocol


Betamethasone/Clotrimazole (Lotrisone)  0 gm TOP BID CYNDEE


   PRN Reason: Protocol


   Last Admin: 08/21/17 18:10 Dose:  1 dose


Diltiazem HCl (Cardizem Cd)  120 mg PO DAILY CYNDEE


Furosemide (Lasix)  20 mg PO DAILY CYNDEE


   PRN Reason: Protocol


Gabapentin (Neurontin)  400 mg PO TID CYNDEE


   PRN Reason: Protocol


   Last Admin: 08/21/17 18:11 Dose:  400 mg


Glipizide (Glucotrol)  10 mg PO BID CYNDEE


   PRN Reason: Protocol


   Last Admin: 08/21/17 18:10 Dose:  10 mg


Cefepime HCl (Maxipime 1gm)  1 gm in 100 mls @ 100 mls/hr IVPB Q8 [facility]E


   PRN Reason: Protocol


Vancomycin HCl (Vancomycin 1gm)  1 gm in 250 mls @ 167 mls/hr IVPB Q12H [facility]


   PRN Reason: Protocol


   Last Admin: 08/21/17 18:00 Dose:  167 mls/hr


Insulin Human Regular (Humulin R Med)  0 units SC ACHS CYN[facility]E


   PRN Reason: Protocol


Metformin HCl (Glucophage)  1,000 mg PO BID CYNDEE


   PRN Reason: Protocol


   Last Admin: 08/21/17 18:09 Dose:  1,000 mg


Non-Formulary Medication (Ferrous Fum/Vit C/B12/Stomc [Hematogen Softgel])  1 

cap PO DAILY CYNDEE


Oxycodone/Acetaminophen (Percocet 5/325 Mg Tab)  1 tab PO Q4H PRN; Protocol


   PRN Reason: Pain, moderate (4-7)


   Stop: 08/24/17 16:49


Pantoprazole Sodium (Protonix Ec Tab)  40 mg PO 0600 CYNDEE


   PRN Reason: Protocol


Pioglitazone HCl (Actos)  45 mg PO DAILY CYNDEE


   PRN Reason: Protocol


Potassium Chloride (K-Dur 20 Meq Er Tab)  20 meq PO DAILY CYNDEE


   PRN Reason: Protocol


Ramipril (Altace)  10 mg PO DAILY [facility]


   PRN Reason: Protocol











Physical Exam





- Constitutional


Appears: Non-toxic, No Acute Distress





- Head Exam


Head Exam: NORMAL INSPECTION





- ENT Exam


ENT Exam: Mucous Membranes Moist





- Neck Exam


Neck exam: Negative for: Meningismus





- Respiratory Exam


Respiratory Exam: Decreased Breath Sounds.  absent: Rales





- Cardiovascular Exam


Cardiovascular Exam: +S1, +S2





- GI/Abdominal Exam


GI & Abdominal Exam: Soft.  absent: Tenderness





- Extremities Exam


Additional comments: 





both legs with dressings and bandages in place





Assessment & Plan





- Assessment and Plan (Free Text)


Plan: 





Assessment


consider right leg skin and skin structure infection associated with chronic 

leg ulcers, growing gram positive cocci and gram negative bacilli


history of sepsis secondary to right leg skin/skin structure infection with 

chronic leg ulcers, growing MRSA and sensitive Klebsiella 


history of MRSA cellulitis Sept. 2016


history of Bilateral lower extremity skin and skin structure infection (

Enterobacter, Klebsiella Oxytoca and Group B Strep, as well as MRSA) which was 

treated 9/2015; MRSA and Pseudomonas cellulitis of left leg in Dec 2015, May 

and June 2016


chronic lymphedema of the lower extremities


Morbid obesity with BMI 55


HTN


DM


history of Strep bacteremia


history of hernia surgery


Learning disability





Plan


continue Vancomycin and Cefepime (day 4) pending identification and 

sensitivities of the gram positive cocci and gram negative bacilli in the wound 

cx


will continue to monitor clinically

## 2017-08-22 NOTE — CP.PCM.PN
<Brenna Hastings - Last Filed: 08/22/17 13:16>





Subjective





- Date & Time of Evaluation


Date of Evaluation: 08/22/17


Time of Evaluation: 13:16





- Subjective


Subjective: 





57 year old male was seen this morning concerning ulcerations to right anterior 

leg and chronic bilateral lower extremity edema. Patient is resting comfortably 

in chair. Bilateral lower extremity bandages are clean dry and intact. No new 

complaints at this time. Patient denies any n/v/f/c/sob/cp. 





Objective





- Vital Signs/Intake and Output


Vital Signs (last 24 hours): 


 











Temp Pulse Resp BP Pulse Ox


 


 98.3 F   73   20   128/70   97 


 


 08/22/17 10:24  08/22/17 10:24  08/22/17 10:24  08/22/17 10:24  08/22/17 10:24











- Medications


Medications: 


 Current Medications





Apixaban (Eliquis)  2.5 mg PO BID CYNDEE


   PRN Reason: Protocol


   Last Admin: 08/22/17 09:42 Dose:  2.5 mg


Atorvastatin Calcium (Lipitor)  10 mg PO DAILY CYNDEE


   PRN Reason: Protocol


   Last Admin: 08/22/17 09:48 Dose:  10 mg


Betamethasone/Clotrimazole (Lotrisone)  0 gm TOP BID CYNDEE


   PRN Reason: Protocol


   Last Admin: 08/22/17 09:47 Dose:  1 dose


Diltiazem HCl (Cardizem Cd)  120 mg PO DAILY CYN[facility]E


   Last Admin: 08/22/17 09:44 Dose:  120 mg


Furosemide (Lasix)  20 mg PO DAILY CYNDEE


   PRN Reason: Protocol


   Last Admin: 08/22/17 09:47 Dose:  20 mg


Gabapentin (Neurontin)  400 mg PO TID CYNDEE


   PRN Reason: Protocol


   Last Admin: 08/22/17 09:48 Dose:  400 mg


Glipizide (Glucotrol)  10 mg PO BID CYNDEE


   PRN Reason: Protocol


   Last Admin: 08/22/17 09:45 Dose:  10 mg


Cefepime HCl (Maxipime 1gm)  1 gm in 100 mls @ 100 mls/hr IVPB Q8 [facility]E


   PRN Reason: Protocol


   Last Admin: 08/22/17 06:14 Dose:  100 mls/hr


Vancomycin HCl (Vancomycin 1gm)  1 gm in 250 mls @ 167 mls/hr IVPB Q12H [facility]E


   PRN Reason: Protocol


   Last Admin: 08/22/17 04:38 Dose:  167 mls/hr


Insulin Human Regular (Humulin R Med)  0 units SC ACHS CYNDEE


   PRN Reason: Protocol


   Last Admin: 08/22/17 12:49 Dose:  5 units


Metformin HCl (Glucophage)  1,000 mg PO BID CYNDEE


   PRN Reason: Protocol


   Last Admin: 08/22/17 09:45 Dose:  1,000 mg


Non-Formulary Medication (Ferrous Fum/Vit C/B12/Stomc [Hematogen Softgel])  1 

cap PO DAILY CYNDEE


   Last Admin: 08/22/17 11:02 Dose:  Not Given


Oxycodone/Acetaminophen (Percocet 5/325 Mg Tab)  1 tab PO Q4H PRN; Protocol


   PRN Reason: Pain, moderate (4-7)


   Stop: 08/24/17 16:49


Pantoprazole Sodium (Protonix Ec Tab)  40 mg PO 0600 CYNDEE


   PRN Reason: Protocol


   Last Admin: 08/22/17 06:13 Dose:  40 mg


Pioglitazone HCl (Actos)  45 mg PO DAILY CYNDEE


   PRN Reason: Protocol


   Last Admin: 08/22/17 09:41 Dose:  45 mg


Potassium Chloride (K-Dur 20 Meq Er Tab)  20 meq PO DAILY CYNDEE


   PRN Reason: Protocol


   Last Admin: 08/22/17 09:46 Dose:  20 meq


Ramipril (Altace)  10 mg PO DAILY CYNDEE


   PRN Reason: Protocol


   Last Admin: 08/22/17 09:43 Dose:  10 mg











- Constitutional


Appears: Well, Non-toxic





- Head Exam


Head Exam: ATRAUMATIC





- Extremities Exam


Additional comments: 








Right Lower extremity focused exam:





DERM: Open ulceration noted to right lower extremity measuring approximately 2 

cm by 1.5 cm by 0.1 cm with granular base at the anterior aspect of the leg 

with scant amount of sanguinous drainage, no purulence, no erythema, mild 

malodor. No sign of acute infection is noted





No open ulcerations noted to the left lower extremity.  Skin is diffusely 

xerotic with hyperpigmentation. Skin changes of chronic edema noted.





VASC: Diffuse edema of bilateral lower extremities. Pedal pulses non-palpable 

due to edema.  Skin temperature warm to warm from proximal to distal b/l





NEURO: Gross sensation diminished b/l





ORTHO: No tenderness on palpation to the lower extremities








- Neurological Exam


Neurological Exam: Alert, Awake





- Psychiatric Exam


Psychiatric exam: Normal Affect, Normal Mood





- Skin


Skin Exam: Normal Color, Warm





Assessment and Plan





- Assessment and Plan (Free Text)


Assessment: 





57 year old diabetic male patient with right leg ulceration and chronic lower 

extremity lymphedema bilaterally


Plan: 








Patient examined and evaluated at bedside 


Discussed with attending Dr. Hobbs


chart, labs, vitals reviewed- afebrile, no leukocytosis





Right leg WCX: MRSA


Left leg WCX: MRSA, Klebsiella





Lotrisone applied to legs b/l


Right LE dressed with xeroform, 4x4, ABD, ACE


Left LE dressed with ACE


Patient to continue IV abx per ID


Podiatry will continue to follow


Pt will f/u in the wound care center after DC 








<Manuel Hobbs - Last Filed: 08/25/17 12:06>





Objective





- Vital Signs/Intake and Output


Vital Signs (last 24 hours): 


 











Temp Pulse Resp BP Pulse Ox


 


 98.2 F   72   20   154/78 H  95 


 


 08/25/17 11:10  08/25/17 11:10  08/25/17 11:10  08/25/17 11:10  08/25/17 11:10











- Medications


Medications: 


 Current Medications





Apixaban (Eliquis)  2.5 mg PO BID CYNDEE


   PRN Reason: Protocol


   Last Admin: 08/25/17 10:12 Dose:  2.5 mg


Atorvastatin Calcium (Lipitor)  10 mg PO DAILY CYNDEE


   PRN Reason: Protocol


   Last Admin: 08/25/17 10:13 Dose:  10 mg


Betamethasone/Clotrimazole (Lotrisone)  0 gm TOP BID CYNDEE


   PRN Reason: Protocol


   Last Admin: 08/25/17 10:13 Dose:  1 applic


Diltiazem HCl (Cardizem Cd)  120 mg PO DAILY CYNDEE


   Last Admin: 08/25/17 10:11 Dose:  120 mg


Furosemide (Lasix)  20 mg PO DAILY CYNDEE


   PRN Reason: Protocol


   Last Admin: 08/25/17 10:13 Dose:  20 mg


Gabapentin (Neurontin)  400 mg PO TID CYNDEE


   PRN Reason: Protocol


   Last Admin: 08/25/17 10:10 Dose:  400 mg


Glipizide (Glucotrol)  10 mg PO BID CYNDEE


   PRN Reason: Protocol


   Last Admin: 08/25/17 10:12 Dose:  10 mg


Vancomycin HCl 1.5 gm/ Sodium (Chloride)  250 mls @ 167 mls/hr IVPB Q12H CYNDEE


   PRN Reason: Protocol


   Last Admin: 08/25/17 10:06 Dose:  167 mls/hr


Cefazolin Sodium (Ancef 1gm In Ns)  1 gm in 100 mls @ 100 mls/hr IVPB Q8 CYNDEE


   PRN Reason: Protocol


   Last Admin: 08/25/17 05:26 Dose:  100 mls/hr


Insulin Human Regular (Humulin R Med)  0 units SC ACHS CYNDEE


   PRN Reason: Protocol


   Last Admin: 08/25/17 07:02 Dose:  1 units


Metformin HCl (Glucophage)  1,000 mg PO BID CYNDEE


   PRN Reason: Protocol


   Last Admin: 08/25/17 10:12 Dose:  1,000 mg


Non-Formulary Medication (Ferrous Fum/Vit C/B12/Stomc [Hematogen Softgel])  1 

cap PO DAILY CYNDEE


   Last Admin: 08/25/17 10:12 Dose:  Not Given


Pantoprazole Sodium (Protonix Ec Tab)  40 mg PO 0600 CYNDEE


   PRN Reason: Protocol


   Last Admin: 08/25/17 05:27 Dose:  40 mg


Pioglitazone HCl (Actos)  45 mg PO DAILY CYNDEE


   PRN Reason: Protocol


   Last Admin: 08/25/17 10:10 Dose:  45 mg


Potassium Chloride (K-Dur 20 Meq Er Tab)  20 meq PO DAILY CYNDEE


   PRN Reason: Protocol


   Last Admin: 08/25/17 10:13 Dose:  20 meq


Ramipril (Altace)  10 mg PO DAILY CYNDEE


   PRN Reason: Protocol


   Last Admin: 08/25/17 10:10 Dose:  10 mg











Attending/Attestation





- Attestation


I have personally seen and examined this patient.: Yes


I have fully participated in the care of the patient.: Yes


I have reviewed all pertinent clinical information, including history, physical 

exam and plan: Yes

## 2017-08-23 PROCEDURE — F07Z8ZZ TRANSFER TRAINING TREATMENT: ICD-10-PCS | Performed by: INTERNAL MEDICINE

## 2017-08-23 PROCEDURE — F07L6ZZ THERAPEUTIC EXERCISE TREATMENT OF MUSCULOSKELETAL SYSTEM - LOWER BACK / LOWER EXTREMITY: ICD-10-PCS | Performed by: INTERNAL MEDICINE

## 2017-08-23 PROCEDURE — F07Z9ZZ GAIT TRAINING/FUNCTIONAL AMBULATION TREATMENT: ICD-10-PCS | Performed by: INTERNAL MEDICINE

## 2017-08-23 RX ADMIN — INSULIN HUMAN SCH: 100 INJECTION, SOLUTION PARENTERAL at 17:21

## 2017-08-23 RX ADMIN — CEFAZOLIN SCH MLS/HR: 330 INJECTION, POWDER, FOR SOLUTION INTRAMUSCULAR; INTRAVENOUS at 21:41

## 2017-08-23 RX ADMIN — PANTOPRAZOLE SODIUM SCH MG: 40 TABLET, DELAYED RELEASE ORAL at 05:42

## 2017-08-23 RX ADMIN — POTASSIUM CHLORIDE SCH MEQ: 20 TABLET, EXTENDED RELEASE ORAL at 10:30

## 2017-08-23 RX ADMIN — CEFEPIME SCH MLS/HR: 1 INJECTION, SOLUTION INTRAVENOUS at 06:44

## 2017-08-23 RX ADMIN — CEFAZOLIN SCH MLS/HR: 330 INJECTION, POWDER, FOR SOLUTION INTRAMUSCULAR; INTRAVENOUS at 13:55

## 2017-08-23 RX ADMIN — VANCOMYCIN HYDROCHLORIDE SCH MLS/HR: 1 INJECTION, POWDER, LYOPHILIZED, FOR SOLUTION INTRAVENOUS at 05:41

## 2017-08-23 RX ADMIN — DILTIAZEM HYDROCHLORIDE SCH MG: 120 CAPSULE, COATED, EXTENDED RELEASE ORAL at 10:40

## 2017-08-23 RX ADMIN — CLOTRIMAZOLE AND BETAMETHASONE DIPROPIONATE SCH DOSE: 10; .5 CREAM TOPICAL at 10:32

## 2017-08-23 RX ADMIN — CLOTRIMAZOLE AND BETAMETHASONE DIPROPIONATE SCH DOSE: 10; .5 CREAM TOPICAL at 17:22

## 2017-08-23 RX ADMIN — INSULIN HUMAN SCH: 100 INJECTION, SOLUTION PARENTERAL at 08:15

## 2017-08-23 RX ADMIN — INSULIN HUMAN SCH UNITS: 100 INJECTION, SOLUTION PARENTERAL at 13:55

## 2017-08-23 RX ADMIN — INSULIN HUMAN SCH: 100 INJECTION, SOLUTION PARENTERAL at 22:11

## 2017-08-23 NOTE — CP.PCM.PN
Subjective





- Date & Time of Evaluation


Date of Evaluation: 08/23/17


Time of Evaluation: 11:10





- Subjective


Subjective: 





Comfortable, not in distress, afebrile, no fevers.





Objective





- Vital Signs/Intake and Output


Vital Signs (last 24 hours): 


 











Temp Pulse Resp BP Pulse Ox


 


 98.7 F   77   20   120/61   99 


 


 08/22/17 16:16  08/22/17 16:16  08/22/17 16:16  08/22/17 16:16  08/22/17 16:16











- Medications


Medications: 


 Current Medications





Apixaban (Eliquis)  2.5 mg PO BID CYNDEE


   PRN Reason: Protocol


   Last Admin: 08/22/17 17:56 Dose:  2.5 mg


Atorvastatin Calcium (Lipitor)  10 mg PO DAILY CYNDEE


   PRN Reason: Protocol


   Last Admin: 08/22/17 09:48 Dose:  10 mg


Betamethasone/Clotrimazole (Lotrisone)  0 gm TOP BID CYNDEE


   PRN Reason: Protocol


   Last Admin: 08/22/17 19:00 Dose:  1 dose


Diltiazem HCl (Cardizem Cd)  120 mg PO DAILY CYNDEE


   Last Admin: 08/22/17 09:44 Dose:  120 mg


Furosemide (Lasix)  20 mg PO DAILY CYNDEE


   PRN Reason: Protocol


   Last Admin: 08/22/17 09:47 Dose:  20 mg


Gabapentin (Neurontin)  400 mg PO TID CYNDEE


   PRN Reason: Protocol


   Last Admin: 08/22/17 17:55 Dose:  400 mg


Glipizide (Glucotrol)  10 mg PO BID CYNDEE


   PRN Reason: Protocol


   Last Admin: 08/22/17 17:55 Dose:  10 mg


Vancomycin HCl (Vancomycin 1gm)  1 gm in 250 mls @ 167 mls/hr IVPB Q12H CYNDEE


   PRN Reason: Protocol


   Last Admin: 08/23/17 05:41 Dose:  167 mls/hr


Vancomycin HCl 1.5 gm/ Sodium (Chloride)  250 mls @ 167 mls/hr IVPB Q12H CYNDEE


   PRN Reason: Protocol


Insulin Human Regular (Humulin R Med)  0 units SC ACHS CYNDEE


   PRN Reason: Protocol


   Last Admin: 08/23/17 08:15 Dose:  Not Given


Metformin HCl (Glucophage)  1,000 mg PO BID CYNDEE


   PRN Reason: Protocol


   Last Admin: 08/22/17 17:55 Dose:  1,000 mg


Non-Formulary Medication (Ferrous Fum/Vit C/B12/Stomc [Hematogen Softgel])  1 

cap PO DAILY [facility]


   Last Admin: 08/22/17 11:02 Dose:  Not Given


Oxycodone/Acetaminophen (Percocet 5/325 Mg Tab)  1 tab PO Q4H PRN; Protocol


   PRN Reason: Pain, moderate (4-7)


   Stop: 08/24/17 16:49


Pantoprazole Sodium (Protonix Ec Tab)  40 mg PO 0600 CYNDEE


   PRN Reason: Protocol


   Last Admin: 08/23/17 05:42 Dose:  40 mg


Pioglitazone HCl (Actos)  45 mg PO DAILY CYNDEE


   PRN Reason: Protocol


   Last Admin: 08/22/17 09:41 Dose:  45 mg


Potassium Chloride (K-Dur 20 Meq Er Tab)  20 meq PO DAILY CYNDEE


   PRN Reason: Protocol


   Last Admin: 08/22/17 09:46 Dose:  20 meq


Ramipril (Altace)  10 mg PO DAILY CYNDEE


   PRN Reason: Protocol


   Last Admin: 08/22/17 09:43 Dose:  10 mg











- Constitutional


Appears: Non-toxic, No Acute Distress





- Head Exam


Head Exam: NORMAL INSPECTION





- Neck Exam


Neck Exam: absent: Meningismus





- Respiratory Exam


Respiratory Exam: Decreased Breath Sounds





- Cardiovascular Exam


Cardiovascular Exam: +S1, +S2





- GI/Abdominal Exam


GI & Abdominal Exam: Soft.  absent: Tenderness





- Extremities Exam


Additional comments: 





both legs with bandages in place





Assessment and Plan





- Assessment and Plan (Free Text)


Plan: 





Assessment


consider right leg skin and skin structure infection associated with chronic 

leg ulcers, MRSA and Klebsiella oxytoca


history of sepsis secondary to right leg skin/skin structure infection with 

chronic leg ulcers, growing MRSA and sensitive Klebsiella 


history of MRSA cellulitis Sept. 2016


history of Bilateral lower extremity skin and skin structure infection (

Enterobacter, Klebsiella Oxytoca and Group B Strep, as well as MRSA) which was 

treated 9/2015; MRSA and Pseudomonas cellulitis of left leg in Dec 2015, May 

and June 2016


chronic lymphedema of the lower extremities


Morbid obesity with BMI 55


HTN


DM


history of Strep bacteremia


history of hernia surgery


Learning disability





Plan


continue Vancomycin and changed Cefepime to Cefazolin (day 5) - complete 7-10 

days of therapy


will continue to monitor clinically

## 2017-08-23 NOTE — CP.PCM.PN
<Brenna Hastings - Last Filed: 08/23/17 12:35>





Subjective





- Date & Time of Evaluation


Date of Evaluation: 08/23/17


Time of Evaluation: 12:35





- Subjective


Subjective: 





57 year old male patient was seen this morning concerning ulcerations to right 

anterior leg and chronic bilateral lower extremity edema. Bilateral lower 

extremity bandages are clean dry and intact. Patient is resting comfortably in 

chair. Patient denies of any other pedal complaints at this time. Patient 

denies any n/v/f/c/sob/cp. 





Objective





- Vital Signs/Intake and Output


Vital Signs (last 24 hours): 


 











Temp Pulse Resp BP Pulse Ox


 


 98.4 F   71   20   145/72   94 L


 


 08/23/17 10:00  08/23/17 10:40  08/23/17 10:00  08/23/17 10:40  08/23/17 10:00











- Medications


Medications: 


 Current Medications





Apixaban (Eliquis)  2.5 mg PO BID CYNDEE


   PRN Reason: Protocol


   Last Admin: 08/23/17 10:41 Dose:  2.5 mg


Atorvastatin Calcium (Lipitor)  10 mg PO DAILY CYNDEE


   PRN Reason: Protocol


   Last Admin: 08/23/17 10:31 Dose:  10 mg


Betamethasone/Clotrimazole (Lotrisone)  0 gm TOP BID CYNDEE


   PRN Reason: Protocol


   Last Admin: 08/23/17 10:32 Dose:  1 dose


Diltiazem HCl (Cardizem Cd)  120 mg PO DAILY CYNDEE


   Last Admin: 08/23/17 10:40 Dose:  120 mg


Furosemide (Lasix)  20 mg PO DAILY CYNDEE


   PRN Reason: Protocol


   Last Admin: 08/23/17 10:30 Dose:  20 mg


Gabapentin (Neurontin)  400 mg PO TID CYNDEE


   PRN Reason: Protocol


   Last Admin: 08/23/17 10:30 Dose:  400 mg


Glipizide (Glucotrol)  10 mg PO BID CYNDEE


   PRN Reason: Protocol


   Last Admin: 08/23/17 10:31 Dose:  10 mg


Vancomycin HCl 1.5 gm/ Sodium (Chloride)  250 mls @ 167 mls/hr IVPB Q12H CYNDEE


   PRN Reason: Protocol


   Last Admin: 08/23/17 10:29 Dose:  167 mls/hr


Cefazolin Sodium (Ancef 1gm In Ns)  1 gm in 100 mls @ 100 mls/hr IVPB Q8 CYNDEE


   PRN Reason: Protocol


Insulin Human Regular (Humulin R Med)  0 units SC ACHS CYNDEE


   PRN Reason: Protocol


   Last Admin: 08/23/17 08:15 Dose:  Not Given


Metformin HCl (Glucophage)  1,000 mg PO BID CYNDEE


   PRN Reason: Protocol


   Last Admin: 08/23/17 10:31 Dose:  1,000 mg


Non-Formulary Medication (Ferrous Fum/Vit C/B12/Stomc [Hematogen Softgel])  1 

cap PO DAILY [facility]


   Last Admin: 08/23/17 10:41 Dose:  Not Given


Oxycodone/Acetaminophen (Percocet 5/325 Mg Tab)  1 tab PO Q4H PRN; Protocol


   PRN Reason: Pain, moderate (4-7)


   Stop: 08/24/17 16:49


Pantoprazole Sodium (Protonix Ec Tab)  40 mg PO 0600 CYNDEE


   PRN Reason: Protocol


   Last Admin: 08/23/17 05:42 Dose:  40 mg


Pioglitazone HCl (Actos)  45 mg PO DAILY CYNDEE


   PRN Reason: Protocol


   Last Admin: 08/23/17 10:33 Dose:  45 mg


Potassium Chloride (K-Dur 20 Meq Er Tab)  20 meq PO DAILY CYNDEE


   PRN Reason: Protocol


   Last Admin: 08/23/17 10:30 Dose:  20 meq


Ramipril (Altace)  10 mg PO DAILY CYNDEE


   PRN Reason: Protocol


   Last Admin: 08/23/17 10:33 Dose:  10 mg











- Constitutional


Appears: Well, Non-toxic, No Acute Distress





- Head Exam


Head Exam: ATRAUMATIC





- Extremities Exam


Additional comments: 





Right Lower extremity focused exam:





DERM: 


RIGHT: Open ulceration noted to right lower extremity measuring approximately 2 

cm by 1.5 cm by 0.1 cm with granular base at the anterior aspect of the leg 

with scant amount of sanguinous drainage, no purulence, no erythema, mild 

malodor. No sign of acute infection is noted





LEFT: No open ulcerations noted to the left lower extremity.  Skin is diffusely 

xerotic with hyperpigmentation. Skin changes of chronic edema noted.





VASC: Diffuse edema of bilateral lower extremities. Pedal pulses non-palpable 

due to edema.  Skin temperature warm to warm from proximal to distal b/l





NEURO: Gross sensation diminished b/l





ORTHO: No tenderness on palpation to the lower extremities








- Neurological Exam


Neurological Exam: Alert, Awake, Oriented x3





- Psychiatric Exam


Psychiatric exam: Normal Affect, Normal Mood





- Skin


Skin Exam: Normal Color, Warm





Assessment and Plan





- Assessment and Plan (Free Text)


Assessment: 





57 year old diabetic male patient with right leg ulceration and chronic lower 

extremity lymphedema bilaterally


Plan: 








Patient examined and evaluated at bedside 


Discussed with attending Dr. Ramirez


chart, labs, vitals reviewed- afebrile, no leukocytosis





Right leg WCX: MRSA


Left leg WCX: MRSA, Klebsiella





Lotrisone applied to legs b/l


Right LE cleansed with sterile normal saline. Dressed with xeroform, 4x4, ABD, 

ACE


Left LE dressed with ACE


Patient to continue IV abx per ID


Podiatry will continue to follow


Pt will f/u in the wound care center after DC 








<Leah Ramirez - Last Filed: 09/03/17 19:31>





Objective





- Vital Signs/Intake and Output


Vital Signs (last 24 hours): 


 











Temp Pulse Resp BP Pulse Ox


 


 99 F   78   18   147/83   92 L


 


 08/25/17 14:00  08/26/17 09:27  08/25/17 14:00  08/26/17 09:30  08/25/17 14:00











Attending/Attestation





- Attestation


I have personally seen and examined this patient.: Yes


I have fully participated in the care of the patient.: Yes


I have reviewed all pertinent clinical information, including history, physical 

exam and plan: Yes

## 2017-08-24 RX ADMIN — PANTOPRAZOLE SODIUM SCH MG: 40 TABLET, DELAYED RELEASE ORAL at 05:15

## 2017-08-24 RX ADMIN — CLOTRIMAZOLE AND BETAMETHASONE DIPROPIONATE SCH APPLIC: 10; .5 CREAM TOPICAL at 18:21

## 2017-08-24 RX ADMIN — INSULIN HUMAN SCH: 100 INJECTION, SOLUTION PARENTERAL at 16:46

## 2017-08-24 RX ADMIN — CEFAZOLIN SCH MLS/HR: 330 INJECTION, POWDER, FOR SOLUTION INTRAMUSCULAR; INTRAVENOUS at 14:15

## 2017-08-24 RX ADMIN — DILTIAZEM HYDROCHLORIDE SCH MG: 120 CAPSULE, COATED, EXTENDED RELEASE ORAL at 10:23

## 2017-08-24 RX ADMIN — CEFAZOLIN SCH MLS/HR: 330 INJECTION, POWDER, FOR SOLUTION INTRAMUSCULAR; INTRAVENOUS at 21:48

## 2017-08-24 RX ADMIN — INSULIN HUMAN SCH UNITS: 100 INJECTION, SOLUTION PARENTERAL at 06:59

## 2017-08-24 RX ADMIN — POTASSIUM CHLORIDE SCH MEQ: 20 TABLET, EXTENDED RELEASE ORAL at 10:17

## 2017-08-24 RX ADMIN — INSULIN HUMAN SCH UNITS: 100 INJECTION, SOLUTION PARENTERAL at 12:13

## 2017-08-24 RX ADMIN — CLOTRIMAZOLE AND BETAMETHASONE DIPROPIONATE SCH APPLIC: 10; .5 CREAM TOPICAL at 10:18

## 2017-08-24 RX ADMIN — INSULIN HUMAN SCH: 100 INJECTION, SOLUTION PARENTERAL at 22:00

## 2017-08-24 RX ADMIN — CEFAZOLIN SCH MLS/HR: 330 INJECTION, POWDER, FOR SOLUTION INTRAMUSCULAR; INTRAVENOUS at 05:15

## 2017-08-24 NOTE — CP.PCM.PN
Subjective





- Date & Time of Evaluation


Date of Evaluation: 08/24/17


Time of Evaluation: 10:15





- Subjective


Subjective: 





Comfortable on a chair, not in distress, afebrile.





Objective





- Vital Signs/Intake and Output


Vital Signs (last 24 hours): 


 











Temp Pulse Resp BP Pulse Ox


 


 98.4 F   85   18   136/82   95 


 


 08/24/17 10:00  08/24/17 10:23  08/24/17 10:00  08/24/17 10:23  08/24/17 10:00











- Medications


Medications: 


 Current Medications





Apixaban (Eliquis)  2.5 mg PO BID CYNDEE


   PRN Reason: Protocol


   Last Admin: 08/24/17 10:16 Dose:  2.5 mg


Atorvastatin Calcium (Lipitor)  10 mg PO DAILY CYNDEE


   PRN Reason: Protocol


   Last Admin: 08/24/17 10:17 Dose:  10 mg


Betamethasone/Clotrimazole (Lotrisone)  0 gm TOP BID CYNDEE


   PRN Reason: Protocol


   Last Admin: 08/24/17 10:18 Dose:  1 applic


Diltiazem HCl (Cardizem Cd)  120 mg PO DAILY CYNDEE


   Last Admin: 08/24/17 10:23 Dose:  120 mg


Furosemide (Lasix)  20 mg PO DAILY CYNDEE


   PRN Reason: Protocol


   Last Admin: 08/24/17 10:22 Dose:  20 mg


Gabapentin (Neurontin)  400 mg PO TID CYNDEE


   PRN Reason: Protocol


   Last Admin: 08/24/17 10:15 Dose:  400 mg


Glipizide (Glucotrol)  10 mg PO BID CYNDEE


   PRN Reason: Protocol


   Last Admin: 08/24/17 10:17 Dose:  10 mg


Vancomycin HCl 1.5 gm/ Sodium (Chloride)  250 mls @ 167 mls/hr IVPB Q12H CYNDEE


   PRN Reason: Protocol


   Last Admin: 08/24/17 08:44 Dose:  167 mls/hr


Cefazolin Sodium (Ancef 1gm In Ns)  1 gm in 100 mls @ 100 mls/hr IVPB Q8 CYNDEE


   PRN Reason: Protocol


   Last Admin: 08/24/17 05:15 Dose:  100 mls/hr


Insulin Human Regular (Humulin R Med)  0 units SC ACHS CYNDEE


   PRN Reason: Protocol


   Last Admin: 08/24/17 06:59 Dose:  3 units


Metformin HCl (Glucophage)  1,000 mg PO BID CYNDEE


   PRN Reason: Protocol


   Last Admin: 08/24/17 10:17 Dose:  1,000 mg


Non-Formulary Medication (Ferrous Fum/Vit C/B12/Stomc [Hematogen Softgel])  1 

cap PO DAILY [facility]


   Last Admin: 08/24/17 10:17 Dose:  Not Given


Oxycodone/Acetaminophen (Percocet 5/325 Mg Tab)  1 tab PO Q4H PRN; Protocol


   PRN Reason: Pain, moderate (4-7)


   Stop: 08/24/17 16:49


Pantoprazole Sodium (Protonix Ec Tab)  40 mg PO 0600 CYNDEE


   PRN Reason: Protocol


   Last Admin: 08/24/17 05:15 Dose:  40 mg


Pioglitazone HCl (Actos)  45 mg PO DAILY CYNDEE


   PRN Reason: Protocol


   Last Admin: 08/24/17 10:16 Dose:  45 mg


Potassium Chloride (K-Dur 20 Meq Er Tab)  20 meq PO DAILY CYNDEE


   PRN Reason: Protocol


   Last Admin: 08/24/17 10:17 Dose:  20 meq


Ramipril (Altace)  10 mg PO DAILY CYNDEE


   PRN Reason: Protocol


   Last Admin: 08/24/17 10:23 Dose:  10 mg











- Constitutional


Appears: Non-toxic, No Acute Distress





- Head Exam


Head Exam: NORMAL INSPECTION





- Neck Exam


Neck Exam: absent: Meningismus





- Respiratory Exam


Respiratory Exam: Decreased Breath Sounds





- Cardiovascular Exam


Cardiovascular Exam: +S1, +S2





- GI/Abdominal Exam


GI & Abdominal Exam: Soft.  absent: Tenderness





- Extremities Exam


Additional comments: 





both lower extremities with dry dressings in place





Assessment and Plan





- Assessment and Plan (Free Text)


Plan: 





Assessment


consider right leg skin and skin structure infection associated with chronic 

leg ulcers, MRSA and Klebsiella oxytoca


history of sepsis secondary to right leg skin/skin structure infection with 

chronic leg ulcers, growing MRSA and sensitive Klebsiella 


history of MRSA cellulitis Sept. 2016


history of Bilateral lower extremity skin and skin structure infection (

Enterobacter, Klebsiella Oxytoca and Group B Strep, as well as MRSA) which was 

treated 9/2015; MRSA and Pseudomonas cellulitis of left leg in Dec 2015, May 

and June 2016


chronic lymphedema of the lower extremities


Morbid obesity with BMI 55


HTN


DM


history of Strep bacteremia


history of hernia surgery


Learning disability





Plan


continue Vancomycin and changed Cefepime to Cefazolin (day 6) - complete 7-10 

days of therapy


will continue to follow clinically

## 2017-08-24 NOTE — CP.PCM.PN
<Brenna Hastings - Last Filed: 08/24/17 10:58>





Subjective





- Date & Time of Evaluation


Date of Evaluation: 08/24/17


Time of Evaluation: 10:59





- Subjective


Subjective: 





57 year old male patient was seen this morning concerning ulcerations to right 

anterior leg and chronic bilateral lower extremity edema. Bilateral lower 

extremity dressing are clean dry and intact. Patient is resting comfortably in 

chair. Patient denies of any other pedal complaints at this time. Patient 

denies any n/v/f/c/sob/cp. 








Objective





- Vital Signs/Intake and Output


Vital Signs (last 24 hours): 


 











Temp Pulse Resp BP Pulse Ox


 


 98.4 F   85   18   136/82   95 


 


 08/24/17 10:00  08/24/17 10:23  08/24/17 10:00  08/24/17 10:23  08/24/17 10:00











- Medications


Medications: 


 Current Medications





Apixaban (Eliquis)  2.5 mg PO BID CYNDEE


   PRN Reason: Protocol


   Last Admin: 08/24/17 10:16 Dose:  2.5 mg


Atorvastatin Calcium (Lipitor)  10 mg PO DAILY CYNDEE


   PRN Reason: Protocol


   Last Admin: 08/24/17 10:17 Dose:  10 mg


Betamethasone/Clotrimazole (Lotrisone)  0 gm TOP BID CYNDEE


   PRN Reason: Protocol


   Last Admin: 08/24/17 10:18 Dose:  1 applic


Diltiazem HCl (Cardizem Cd)  120 mg PO DAILY CYNDEE


   Last Admin: 08/24/17 10:23 Dose:  120 mg


Furosemide (Lasix)  20 mg PO DAILY CYNDEE


   PRN Reason: Protocol


   Last Admin: 08/24/17 10:22 Dose:  20 mg


Gabapentin (Neurontin)  400 mg PO TID CYNDEE


   PRN Reason: Protocol


   Last Admin: 08/24/17 10:15 Dose:  400 mg


Glipizide (Glucotrol)  10 mg PO BID CYNDEE


   PRN Reason: Protocol


   Last Admin: 08/24/17 10:17 Dose:  10 mg


Vancomycin HCl 1.5 gm/ Sodium (Chloride)  250 mls @ 167 mls/hr IVPB Q12H CYNDEE


   PRN Reason: Protocol


   Last Admin: 08/24/17 08:44 Dose:  167 mls/hr


Cefazolin Sodium (Ancef 1gm In Ns)  1 gm in 100 mls @ 100 mls/hr IVPB Q8 CYNDEE


   PRN Reason: Protocol


   Last Admin: 08/24/17 05:15 Dose:  100 mls/hr


Insulin Human Regular (Humulin R Med)  0 units SC ACHS CYNDEE


   PRN Reason: Protocol


   Last Admin: 08/24/17 06:59 Dose:  3 units


Metformin HCl (Glucophage)  1,000 mg PO BID CYNDEE


   PRN Reason: Protocol


   Last Admin: 08/24/17 10:17 Dose:  1,000 mg


Non-Formulary Medication (Ferrous Fum/Vit C/B12/Stomc [Hematogen Softgel])  1 

cap PO DAILY CYNDEE


   Last Admin: 08/24/17 10:17 Dose:  Not Given


Oxycodone/Acetaminophen (Percocet 5/325 Mg Tab)  1 tab PO Q4H PRN; Protocol


   PRN Reason: Pain, moderate (4-7)


   Stop: 08/24/17 16:49


Pantoprazole Sodium (Protonix Ec Tab)  40 mg PO 0600 CYNDEE


   PRN Reason: Protocol


   Last Admin: 08/24/17 05:15 Dose:  40 mg


Pioglitazone HCl (Actos)  45 mg PO DAILY CYNDEE


   PRN Reason: Protocol


   Last Admin: 08/24/17 10:16 Dose:  45 mg


Potassium Chloride (K-Dur 20 Meq Er Tab)  20 meq PO DAILY CYNDEE


   PRN Reason: Protocol


   Last Admin: 08/24/17 10:17 Dose:  20 meq


Ramipril (Altace)  10 mg PO DAILY CYNDEE


   PRN Reason: Protocol


   Last Admin: 08/24/17 10:23 Dose:  10 mg











- Constitutional


Appears: Well, Non-toxic, No Acute Distress





- Extremities Exam


Additional comments: 





Right Lower extremity focused exam:





DERM: 


RIGHT: Open ulceration noted to right lower extremity measuring approximately 2 

cm by 1.5 cm by 0.1 cm with granular base at the anterior aspect of the leg 

with scant amount of sanguinous drainage, no purulence, no erythema, mild 

malodor. No sign of acute infection is noted





LEFT: No open ulcerations noted to the left lower extremity.  Skin is diffusely 

xerotic with hyperpigmentation. Skin changes of chronic edema noted.





VASC: Diffuse edema of bilateral lower extremities. Pedal pulses non-palpable 

due to edema.  Skin temperature warm to warm from proximal to distal b/l





NEURO: Gross sensation diminished b/l





ORTHO: No tenderness on palpation to the lower extremities








Assessment and Plan





- Assessment and Plan (Free Text)


Assessment: 





57 year old diabetic male patient with right leg ulceration and chronic lower 

extremity lymphedema bilaterally


Plan: 





Patient examined and evaluated at bedside 


Discussed with attending Dr. Ramirez


chart, labs, vitals reviewed


Lotrisone applied to legs b/l


Right LE cleansed with sterile normal saline. Dressed with Maxorb, 4x4, ABD, ACE


Left LE dressed with ACE


Patient to continue IV abx per ID


Patient is stable from podiatry standpoint


Podiatry will continue to follow











<Leah Ramirez - Last Filed: 09/03/17 19:39>





Objective





- Vital Signs/Intake and Output


Vital Signs (last 24 hours): 


 











Temp Pulse Resp BP Pulse Ox


 


 99 F   78   18   147/83   92 L


 


 08/25/17 14:00  08/26/17 09:27  08/25/17 14:00  08/26/17 09:30  08/25/17 14:00











Attending/Attestation





- Attestation


I have personally seen and examined this patient.: Yes


I have fully participated in the care of the patient.: Yes


I have reviewed all pertinent clinical information, including history, physical 

exam and plan: Yes

## 2017-08-25 VITALS — TEMPERATURE: 99 F | OXYGEN SATURATION: 92 % | RESPIRATION RATE: 18 BRPM

## 2017-08-25 RX ADMIN — PANTOPRAZOLE SODIUM SCH MG: 40 TABLET, DELAYED RELEASE ORAL at 05:27

## 2017-08-25 RX ADMIN — CEFAZOLIN SCH MLS/HR: 330 INJECTION, POWDER, FOR SOLUTION INTRAMUSCULAR; INTRAVENOUS at 14:30

## 2017-08-25 RX ADMIN — CEFAZOLIN SCH MLS/HR: 330 INJECTION, POWDER, FOR SOLUTION INTRAMUSCULAR; INTRAVENOUS at 05:26

## 2017-08-25 RX ADMIN — INSULIN HUMAN SCH UNITS: 100 INJECTION, SOLUTION PARENTERAL at 07:02

## 2017-08-25 RX ADMIN — CLOTRIMAZOLE AND BETAMETHASONE DIPROPIONATE SCH APPLIC: 10; .5 CREAM TOPICAL at 17:23

## 2017-08-25 RX ADMIN — INSULIN HUMAN SCH: 100 INJECTION, SOLUTION PARENTERAL at 17:21

## 2017-08-25 RX ADMIN — CLOTRIMAZOLE AND BETAMETHASONE DIPROPIONATE SCH APPLIC: 10; .5 CREAM TOPICAL at 10:13

## 2017-08-25 RX ADMIN — DILTIAZEM HYDROCHLORIDE SCH MG: 120 CAPSULE, COATED, EXTENDED RELEASE ORAL at 10:11

## 2017-08-25 RX ADMIN — INSULIN HUMAN SCH UNITS: 100 INJECTION, SOLUTION PARENTERAL at 12:27

## 2017-08-25 RX ADMIN — INSULIN HUMAN SCH: 100 INJECTION, SOLUTION PARENTERAL at 21:28

## 2017-08-25 RX ADMIN — CEFAZOLIN SCH MLS/HR: 330 INJECTION, POWDER, FOR SOLUTION INTRAMUSCULAR; INTRAVENOUS at 23:07

## 2017-08-25 RX ADMIN — POTASSIUM CHLORIDE SCH MEQ: 20 TABLET, EXTENDED RELEASE ORAL at 10:13

## 2017-08-25 NOTE — CP.PCM.PN
<Brenna Hastings - Last Filed: 08/25/17 13:17>





Subjective





- Date & Time of Evaluation


Date of Evaluation: 08/25/17


Time of Evaluation: 10:55





- Subjective


Subjective: 





57 year old male patient was seen this morning concerning ulcerations to right 

anterior leg and chronic bilateral lower extremity edema. Patient is resting 

comfortably in chair. Dressing remains cdi. Patient denies of any other pedal 

complaints at this time. Patient denies any n/v/f/c/sob/cp. 





Objective





- Vital Signs/Intake and Output


Vital Signs (last 24 hours): 


 











Temp Pulse Resp BP Pulse Ox


 


 98.2 F   72   20   154/78 H  95 


 


 08/25/17 11:10  08/25/17 11:10  08/25/17 11:10  08/25/17 11:10  08/25/17 11:10











- Medications


Medications: 


 Current Medications





Apixaban (Eliquis)  2.5 mg PO BID CYNDEE


   PRN Reason: Protocol


   Last Admin: 08/25/17 10:12 Dose:  2.5 mg


Atorvastatin Calcium (Lipitor)  10 mg PO DAILY CYNDEE


   PRN Reason: Protocol


   Last Admin: 08/25/17 10:13 Dose:  10 mg


Betamethasone/Clotrimazole (Lotrisone)  0 gm TOP BID CYNDEE


   PRN Reason: Protocol


   Last Admin: 08/25/17 10:13 Dose:  1 applic


Diltiazem HCl (Cardizem Cd)  120 mg PO DAILY CYNDEE


   Last Admin: 08/25/17 10:11 Dose:  120 mg


Furosemide (Lasix)  20 mg PO DAILY CYNDEE


   PRN Reason: Protocol


   Last Admin: 08/25/17 10:13 Dose:  20 mg


Gabapentin (Neurontin)  400 mg PO TID CYNDEE


   PRN Reason: Protocol


   Last Admin: 08/25/17 10:10 Dose:  400 mg


Glipizide (Glucotrol)  10 mg PO BID CYNDEE


   PRN Reason: Protocol


   Last Admin: 08/25/17 10:12 Dose:  10 mg


Vancomycin HCl 1.5 gm/ Sodium (Chloride)  250 mls @ 167 mls/hr IVPB Q12H CYNDEE


   PRN Reason: Protocol


   Last Admin: 08/25/17 10:06 Dose:  167 mls/hr


Cefazolin Sodium (Ancef 1gm In Ns)  1 gm in 100 mls @ 100 mls/hr IVPB Q8 CYNDEE


   PRN Reason: Protocol


   Last Admin: 08/25/17 05:26 Dose:  100 mls/hr


Insulin Human Regular (Humulin R Med)  0 units SC ACHS CYNDEE


   PRN Reason: Protocol


   Last Admin: 08/25/17 12:27 Dose:  1 units


Metformin HCl (Glucophage)  1,000 mg PO BID CYNDEE


   PRN Reason: Protocol


   Last Admin: 08/25/17 10:12 Dose:  1,000 mg


Non-Formulary Medication (Ferrous Fum/Vit C/B12/Stomc [Hematogen Softgel])  1 

cap PO DAILY CYNDEE


   Last Admin: 08/25/17 10:12 Dose:  Not Given


Pantoprazole Sodium (Protonix Ec Tab)  40 mg PO 0600 CYNDEE


   PRN Reason: Protocol


   Last Admin: 08/25/17 05:27 Dose:  40 mg


Pioglitazone HCl (Actos)  45 mg PO DAILY CYNDEE


   PRN Reason: Protocol


   Last Admin: 08/25/17 10:10 Dose:  45 mg


Potassium Chloride (K-Dur 20 Meq Er Tab)  20 meq PO DAILY CYNDEE


   PRN Reason: Protocol


   Last Admin: 08/25/17 10:13 Dose:  20 meq


Ramipril (Altace)  10 mg PO DAILY CYNDEE


   PRN Reason: Protocol


   Last Admin: 08/25/17 10:10 Dose:  10 mg











- Constitutional


Appears: Well, Non-toxic, No Acute Distress





- Head Exam


Head Exam: ATRAUMATIC





- Extremities Exam


Additional comments: 





Right Lower extremity focused exam:





DERM: 


RIGHT: Open ulceration noted to right lower extremity measuring approximately 2 

cm by 1.5 cm by 0.1 cm with granular base at the anterior aspect of the leg 

with scant amount of sanguinous drainage, no purulence, no erythema, mild 

malodor. No sign of acute infection is noted





LEFT: No open ulcerations noted to the left lower extremity.  Skin is diffusely 

xerotic with hyperpigmentation. Skin changes of chronic edema noted.





VASC: Diffuse edema of bilateral lower extremities. Pedal pulses non-palpable 

due to edema.  Skin temperature warm to warm from proximal to distal b/l





NEURO: Gross sensation diminished b/l





ORTHO: No tenderness on palpation to the lower extremities








- Neurological Exam


Neurological Exam: Alert, Awake, Oriented x3





- Psychiatric Exam


Psychiatric exam: Depressed, Normal Affect, Normal Mood





- Skin


Skin Exam: Normal Color, Warm





Assessment and Plan





- Assessment and Plan (Free Text)


Assessment: 








57 year old diabetic male patient with right leg ulceration and chronic lower 

extremity lymphedema bilaterally





Plan: 





Patient examined and evaluated at bedside 


Discussed with attending Dr. Hobbs


chart, labs, vitals reviewed


Lotrisone applied to legs b/l


Right LE cleansed with sterile normal saline. Dressed with Maxorb, 4x4, ABD, ACE


Left LE dressed with ACE


Patient to continue IV abx per ID


Patient is stable from podiatry standpoint


Podiatry will continue to follow








<Manuel Hobbs - Last Filed: 08/25/17 16:33>





Objective





- Vital Signs/Intake and Output


Vital Signs (last 24 hours): 


 











Temp Pulse Resp BP Pulse Ox


 


 98.2 F   72   20   154/78 H  95 


 


 08/25/17 11:10  08/25/17 11:10  08/25/17 11:10  08/25/17 11:10  08/25/17 11:10











- Medications


Medications: 


 Current Medications





Apixaban (Eliquis)  2.5 mg PO BID CYNDEE


   PRN Reason: Protocol


   Last Admin: 08/25/17 10:12 Dose:  2.5 mg


Atorvastatin Calcium (Lipitor)  10 mg PO DAILY CYNDEE


   PRN Reason: Protocol


   Last Admin: 08/25/17 10:13 Dose:  10 mg


Betamethasone/Clotrimazole (Lotrisone)  0 gm TOP BID CYNDEE


   PRN Reason: Protocol


   Last Admin: 08/25/17 10:13 Dose:  1 applic


Diltiazem HCl (Cardizem Cd)  120 mg PO DAILY CYNDEE


   Last Admin: 08/25/17 10:11 Dose:  120 mg


Furosemide (Lasix)  20 mg PO DAILY CYNDEE


   PRN Reason: Protocol


   Last Admin: 08/25/17 10:13 Dose:  20 mg


Gabapentin (Neurontin)  400 mg PO TID CYNDEE


   PRN Reason: Protocol


   Last Admin: 08/25/17 10:10 Dose:  400 mg


Glipizide (Glucotrol)  10 mg PO BID CYNDEE


   PRN Reason: Protocol


   Last Admin: 08/25/17 10:12 Dose:  10 mg


Vancomycin HCl 1.5 gm/ Sodium (Chloride)  250 mls @ 167 mls/hr IVPB Q12H CYNDEE


   PRN Reason: Protocol


   Last Admin: 08/25/17 10:06 Dose:  167 mls/hr


Cefazolin Sodium (Ancef 1gm In Ns)  1 gm in 100 mls @ 100 mls/hr IVPB Q8 CYNDEE


   PRN Reason: Protocol


   Last Admin: 08/25/17 14:30 Dose:  100 mls/hr


Insulin Human Regular (Humulin R Med)  0 units SC ACHS CYNDEE


   PRN Reason: Protocol


   Last Admin: 08/25/17 12:27 Dose:  1 units


Metformin HCl (Glucophage)  1,000 mg PO BID CYNDEE


   PRN Reason: Protocol


   Last Admin: 08/25/17 10:12 Dose:  1,000 mg


Non-Formulary Medication (Ferrous Fum/Vit C/B12/Stomc [Hematogen Softgel])  1 

cap PO DAILY CYNDEE


   Last Admin: 08/25/17 10:12 Dose:  Not Given


Pantoprazole Sodium (Protonix Ec Tab)  40 mg PO 0600 CYNDEE


   PRN Reason: Protocol


   Last Admin: 08/25/17 05:27 Dose:  40 mg


Pioglitazone HCl (Actos)  45 mg PO DAILY CYNDEE


   PRN Reason: Protocol


   Last Admin: 08/25/17 10:10 Dose:  45 mg


Potassium Chloride (K-Dur 20 Meq Er Tab)  20 meq PO DAILY CYNDEE


   PRN Reason: Protocol


   Last Admin: 08/25/17 10:13 Dose:  20 meq


Ramipril (Altace)  10 mg PO DAILY CYNDEE


   PRN Reason: Protocol


   Last Admin: 08/25/17 10:10 Dose:  10 mg











Attending/Attestation





- Attestation


I have personally seen and examined this patient.: Yes


I have fully participated in the care of the patient.: Yes


I have reviewed all pertinent clinical information, including history, physical 

exam and plan: Yes

## 2017-08-25 NOTE — PN
SUBJECTIVE:  The patient has no complaints of any chest pain, no shortness

of breath, no headaches or dizziness.



PHYSICAL EXAMINATION:

VITAL SIGNS:  Temperature is 98.6, pulse is 73, blood pressure is 140/62

and respirations 18.

GENERAL:  The patient is lying in bed, flat, comfortable.

HEENT:  No oral lesion.  Anicteric sclerae.  Moist mucosa.

NECK:  No JVD, adenopathy, or thyromegaly.

CARDIOVASCULAR:  S1 and S2, regular.  No murmurs, rubs, or gallops.

LUNGS:  Clear to auscultation bilaterally.  No wheeze, rales, or rhonchi.

ABDOMEN:  Bowel sounds are positive, soft, nontender and nondistended.

EXTREMITIES:  No cyanosis, clubbing or edema.



ASSESSMENT:

1.  Nephrolithiasis.

2.  Cellulitis.

3.  Obesity with a BMI of 40.

4.  Atrial fibrillation, on anticoagulation.

5.  Hypertension.

6.  Dyslipidemia.



PLAN:  The patient is currently comfortable.  He is going to continue with

his Actos for his diabetes.  He is on ramipril for his hypertension.  He is

on Eliquis for his anticoagulation.  The patient is on metformin for his

diabetes as well.  He is receiving Lasix and potassium.  He is on

Neurontin.  He is on antibiotics with vancomycin.  He is being followed by

Dr. Quinn from infectious disease and podiatry.  He will continue the

antibiotics, now being his last day of antibiotics.







__________________________________________

Delbert Corona MD





DD:  08/24/2017 22:33:23

DT:  08/24/2017 23:23:18

Job # [id number]

## 2017-08-26 VITALS — DIASTOLIC BLOOD PRESSURE: 83 MMHG | SYSTOLIC BLOOD PRESSURE: 147 MMHG | HEART RATE: 78 BPM

## 2017-08-26 RX ADMIN — CLOTRIMAZOLE AND BETAMETHASONE DIPROPIONATE SCH APPLIC: 10; .5 CREAM TOPICAL at 09:31

## 2017-08-26 RX ADMIN — PANTOPRAZOLE SODIUM SCH MG: 40 TABLET, DELAYED RELEASE ORAL at 05:00

## 2017-08-26 RX ADMIN — DILTIAZEM HYDROCHLORIDE SCH MG: 120 CAPSULE, COATED, EXTENDED RELEASE ORAL at 09:27

## 2017-08-26 RX ADMIN — CEFAZOLIN SCH MLS/HR: 330 INJECTION, POWDER, FOR SOLUTION INTRAMUSCULAR; INTRAVENOUS at 05:00

## 2017-08-26 RX ADMIN — INSULIN HUMAN SCH UNITS: 100 INJECTION, SOLUTION PARENTERAL at 12:27

## 2017-08-26 RX ADMIN — POTASSIUM CHLORIDE SCH MEQ: 20 TABLET, EXTENDED RELEASE ORAL at 09:30

## 2017-08-26 RX ADMIN — INSULIN HUMAN SCH: 100 INJECTION, SOLUTION PARENTERAL at 06:37

## 2017-08-26 NOTE — CP.PCM.PN
<Brenna Hastings - Last Filed: 08/26/17 08:30>





Subjective





- Date & Time of Evaluation


Date of Evaluation: 08/26/17


Time of Evaluation: 08:30





- Subjective


Subjective: 





57 year old male patient was seen this morning concerning ulcerations to right 

anterior leg and chronic bilateral lower extremity edema. AAOx3. Patient is 

resting comfortably in chair. Dressing remains cdi. Patient denies of any other 

pedal complaints at this time. Patient denies any n/v/f/c/sob/cp. 








Objective





- Vital Signs/Intake and Output


Vital Signs (last 24 hours): 


 











Temp Pulse Resp BP Pulse Ox


 


 99 F   75   18   130/68   92 L


 


 08/25/17 14:00  08/25/17 14:00  08/25/17 14:00  08/25/17 14:00  08/25/17 14:00











- Medications


Medications: 


 Current Medications





Apixaban (Eliquis)  2.5 mg PO BID CYNDEE


   PRN Reason: Protocol


   Last Admin: 08/25/17 17:22 Dose:  2.5 mg


Atorvastatin Calcium (Lipitor)  10 mg PO DAILY CYNDEE


   PRN Reason: Protocol


   Last Admin: 08/25/17 10:13 Dose:  10 mg


Betamethasone/Clotrimazole (Lotrisone)  0 gm TOP BID CYNDEE


   PRN Reason: Protocol


   Last Admin: 08/25/17 17:23 Dose:  1 applic


Diltiazem HCl (Cardizem Cd)  120 mg PO DAILY CYNDEE


   Last Admin: 08/25/17 10:11 Dose:  120 mg


Furosemide (Lasix)  20 mg PO DAILY CYNDEE


   PRN Reason: Protocol


   Last Admin: 08/25/17 10:13 Dose:  20 mg


Gabapentin (Neurontin)  400 mg PO TID CYNDEE


   PRN Reason: Protocol


   Last Admin: 08/25/17 17:22 Dose:  400 mg


Glipizide (Glucotrol)  10 mg PO BID CYNDEE


   PRN Reason: Protocol


   Last Admin: 08/25/17 17:23 Dose:  10 mg


Vancomycin HCl 1.5 gm/ Sodium (Chloride)  250 mls @ 167 mls/hr IVPB Q12H CYNDEE


   PRN Reason: Protocol


   Last Admin: 08/25/17 21:28 Dose:  167 mls/hr


Cefazolin Sodium (Ancef 1gm In Ns)  1 gm in 100 mls @ 100 mls/hr IVPB Q8 CYNDEE


   PRN Reason: Protocol


   Last Admin: 08/26/17 05:00 Dose:  100 mls/hr


Insulin Human Regular (Humulin R Med)  0 units SC ACHS CYNDEE


   PRN Reason: Protocol


   Last Admin: 08/26/17 06:37 Dose:  Not Given


Metformin HCl (Glucophage)  1,000 mg PO BID CYNDEE


   PRN Reason: Protocol


   Last Admin: 08/25/17 17:23 Dose:  1,000 mg


Non-Formulary Medication (Ferrous Fum/Vit C/B12/Stomc [Hematogen Softgel])  1 

cap PO DAILY CYNDEE


   Last Admin: 08/25/17 10:12 Dose:  Not Given


Pantoprazole Sodium (Protonix Ec Tab)  40 mg PO 0600 CYNDEE


   PRN Reason: Protocol


   Last Admin: 08/26/17 05:00 Dose:  40 mg


Pioglitazone HCl (Actos)  45 mg PO DAILY CYNDEE


   PRN Reason: Protocol


   Last Admin: 08/25/17 10:10 Dose:  45 mg


Potassium Chloride (K-Dur 20 Meq Er Tab)  20 meq PO DAILY CYNDEE


   PRN Reason: Protocol


   Last Admin: 08/25/17 10:13 Dose:  20 meq


Ramipril (Altace)  10 mg PO DAILY CYNDEE


   PRN Reason: Protocol


   Last Admin: 08/25/17 10:10 Dose:  10 mg











- Constitutional


Appears: Well, Non-toxic, No Acute Distress





- Head Exam


Head Exam: ATRAUMATIC





- Extremities Exam


Additional comments: 








Right Lower extremity focused exam:





DERM: 


RIGHT: Open ulceration noted to right lower extremity measuring approximately 2 

cm by 1.5 cm by 0.1 cm with granular base at the anterior aspect of the leg 

with scant amount of sanguinous drainage, no purulence, no erythema, mild 

malodor. No sign of acute infection is noted





LEFT: No open ulcerations noted to the left lower extremity.  Skin is diffusely 

xerotic with hyperpigmentation. Skin changes of chronic edema noted.





VASC: Diffuse edema of bilateral lower extremities. Pedal pulses non-palpable 

due to edema.  Skin temperature warm to warm from proximal to distal b/l





NEURO: Gross sensation diminished b/l





ORTHO: No tenderness on palpation to the lower extremities











- Neurological Exam


Neurological Exam: Alert, Awake, Oriented x3





- Psychiatric Exam


Psychiatric exam: Normal Affect, Normal Mood





- Skin


Skin Exam: Normal Color, Warm





Assessment and Plan





- Assessment and Plan (Free Text)


Assessment: 





57 year old diabetic male patient with right leg ulceration and chronic lower 

extremity lymphedema bilaterally


Plan: 





Patient examined and evaluated at bedside 


Discussed with attending Dr. Hobbs


chart, labs, vitals reviewed


Lotrisone applied to legs b/l


Right LE cleansed with sterile normal saline. Dressed with Maxorb, 4x4, ABD, ACE


Left LE dressed with ACE


Patient to continue IV abx per ID


Patient is stable from podiatry standpoint


Podiatry will continue to follow








<Manuel Hobbs - Last Filed: 08/26/17 09:28>





Objective





- Vital Signs/Intake and Output


Vital Signs (last 24 hours): 


 











Temp Pulse Resp BP Pulse Ox


 


 99 F   75   18   130/68   92 L


 


 08/25/17 14:00  08/25/17 14:00  08/25/17 14:00  08/25/17 14:00  08/25/17 14:00











- Medications


Medications: 


 Current Medications





Apixaban (Eliquis)  2.5 mg PO BID CYNDEE


   PRN Reason: Protocol


   Last Admin: 08/25/17 17:22 Dose:  2.5 mg


Atorvastatin Calcium (Lipitor)  10 mg PO DAILY CYNDEE


   PRN Reason: Protocol


   Last Admin: 08/25/17 10:13 Dose:  10 mg


Betamethasone/Clotrimazole (Lotrisone)  0 gm TOP BID CYNDEE


   PRN Reason: Protocol


   Last Admin: 08/25/17 17:23 Dose:  1 applic


Diltiazem HCl (Cardizem Cd)  120 mg PO DAILY CYNDEE


   Last Admin: 08/25/17 10:11 Dose:  120 mg


Furosemide (Lasix)  20 mg PO DAILY CYNDEE


   PRN Reason: Protocol


   Last Admin: 08/25/17 10:13 Dose:  20 mg


Gabapentin (Neurontin)  400 mg PO TID CYNDEE


   PRN Reason: Protocol


   Last Admin: 08/25/17 17:22 Dose:  400 mg


Glipizide (Glucotrol)  10 mg PO BID CYNDEE


   PRN Reason: Protocol


   Last Admin: 08/25/17 17:23 Dose:  10 mg


Vancomycin HCl 1.5 gm/ Sodium (Chloride)  250 mls @ 167 mls/hr IVPB Q12H CYNDEE


   PRN Reason: Protocol


   Last Admin: 08/25/17 21:28 Dose:  167 mls/hr


Cefazolin Sodium (Ancef 1gm In Ns)  1 gm in 100 mls @ 100 mls/hr IVPB Q8 CYNDEE


   PRN Reason: Protocol


   Last Admin: 08/26/17 05:00 Dose:  100 mls/hr


Insulin Human Regular (Humulin R Med)  0 units SC ACHS CYNDEE


   PRN Reason: Protocol


   Last Admin: 08/26/17 06:37 Dose:  Not Given


Metformin HCl (Glucophage)  1,000 mg PO BID CYNDEE


   PRN Reason: Protocol


   Last Admin: 08/25/17 17:23 Dose:  1,000 mg


Non-Formulary Medication (Ferrous Fum/Vit C/B12/Stomc [Hematogen Softgel])  1 

cap PO DAILY CYNDEE


   Last Admin: 08/25/17 10:12 Dose:  Not Given


Pantoprazole Sodium (Protonix Ec Tab)  40 mg PO 0600 CYNDEE


   PRN Reason: Protocol


   Last Admin: 08/26/17 05:00 Dose:  40 mg


Pioglitazone HCl (Actos)  45 mg PO DAILY CYNDEE


   PRN Reason: Protocol


   Last Admin: 08/25/17 10:10 Dose:  45 mg


Potassium Chloride (K-Dur 20 Meq Er Tab)  20 meq PO DAILY CYNDEE


   PRN Reason: Protocol


   Last Admin: 08/25/17 10:13 Dose:  20 meq


Ramipril (Altace)  10 mg PO DAILY CYNDEE


   PRN Reason: Protocol


   Last Admin: 08/25/17 10:10 Dose:  10 mg











Attending/Attestation





- Attestation


I have personally seen and examined this patient.: Yes


I have fully participated in the care of the patient.: Yes


I have reviewed all pertinent clinical information, including history, physical 

exam and plan: Yes

## 2017-08-26 NOTE — PN
DATE:  08/26/2017



SUBJECTIVE:  Patient is in bed, in no acute distress.  Nontoxic.  He was

seen earlier.  No fevers.  No chills.



PHYSICAL EXAMINATION:

VITAL SIGNS:  Temperature is 98, blood pressure is 120/70 and respiratory

rate of 16.

HEENT:  Unremarkable.

NECK:  Supple.

LUNGS:  Decreased breath sounds.

HEART:  Normal S1 and S2.

ABDOMEN:  Soft and nontender.



LABORATORY DATA:  Reviewed.



ASSESSMENT AND PLAN:  This is a 57-year-old male with morbid obesity with

body mass index of 55 with right leg skin and skin soft tissue infection

and chronic leg ulcer, methicillin-resistant Staphylococcus aureus and

Klebsiella oxytoca, history of hypertension, diabetes, chronic lymphedema

lower extremities, history of group B strep infection and mentally

challenged.  Patient for discharge today.  No further antibiotics.  Legs

have resolved and case discussed with Dr. Boateng who states will discharge

the patient today.







__________________________________________

Gilbert Villegas MD





DD:  08/26/2017 12:18:47

DT:  08/26/2017 12:44:12

Job # [id number]

## 2017-08-26 NOTE — PN
DATE:  08/25/2017



SUBJECTIVE:  Patient seen in bed, in no acute distress.



PHYSICAL EXAMINATION:

VITAL SIGNS:  Temperature of 99, blood pressure is 130/60 and respiratory

rate of 18.

HEENT:  Unremarkable.

NECK:  Supple.

LUNGS:  Decreased breath sounds.

HEART:  Normal S1 and S2.

ABDOMEN:  Soft.



LABORATORY DATA:  Noted.  Examination of leg is also noted.



ASSESSMENT AND PLAN:  This is a 57-year-old male with morbid obesity with

body mass index of 55 with right leg skin and skin soft tissue infection

associated with chronic leg ulcer, MRSA and Klebsiella oxytoca, history of

sepsis, hypertension, diabetes, chronic lymphedema lower extremities,

history of group B strep infection and mentally challenged.  Currently, on

cefazolin day #7.  We will follow with you.  Patient is also on IV

vancomycin in addition to cefazolin.





__________________________________________

Gilbert Villegas MD





DD:  08/25/2017 22:14:00

DT:  08/26/2017 0:14:08

Job # [id number]

## 2017-08-26 NOTE — DS
HISTORY OF PRESENT ILLNESS:  This is a 57-year-old male who comes to the

hospital with chronic cellulitis.  He was given IV antibiotics and finished

7 days of antibiotics.  He is going to be discharged home tomorrow.  Follow

up with his primary care doctor Dr. Peraza.  He has no complaints of any

headaches or dizziness, and he is ambulating well.



PHYSICAL EXAMINATION:

VITAL SIGNS:  He has had a temperature of 98.2, pulse of 72, blood pressure

of 154/78, respiratory rate of 20, and O2 saturation 95%.

GENERAL:  The patient is lying in bed, flat, comfortable.

HEENT:  No oral lesion.  Anicteric sclerae.  Moist mucosa.

NECK:  No JVD, adenopathy, or thyromegaly.

CARDIOVASCULAR:  S1 and S2, regular.  No murmurs, rubs, or gallops.

LUNGS:  Clear to auscultation bilaterally.  No wheeze, rales, or rhonchi.

ABDOMEN:  Bowel sounds are positive, soft, nontender and nondistended.

EXTREMITIES:  Lower extremity, chronic skin changes.



ASSESSMENT:

1.  Cellulitis, x7 days of antibiotics.

2.  Nephrolithiasis.

3.  Obesity with a body mass index of 40.

4.  Atrial fibrillation, on anticoagulation.

5.  Hypertension.

6.  Dyslipidemia.



PLAN:  The patient is currently on Actos for his diabetes.  He is going to

continue on Eliquis for his anticoagulation.  He is on glipizide for his

diabetes and metformin as well.  He is receiving potassium replacement.  He

is on Protonix.  He is otherwise comfortable.  Condition is stable. 

Activity is increased as tolerated.







__________________________________________

Delbert Corona MD



DD:  08/25/2017 20:55:33

DT:  08/26/2017 3:01:15

Job # [id number]

## 2017-09-06 NOTE — DS
DATE OF SERVICE:  09/05/2017



NOTE:  This is a 57-year-old male who had left ureteral stone.  It was 5

mm.  He was also found to have lower extremity cellulitis.  He was treated

with IV antibiotics.  He had improvement of his symptoms.  He had passed. 

Please see the note dictated on 08/22/2017 for details.





__________________________________________

Delbert Corona MD





DD:  09/05/2017 18:29:57

DT:  09/06/2017 1:00:52

Job # [id number]

## 2017-12-07 NOTE — CP.PCM.CON
<Danae Iyer - Last Filed: 08/19/17 09:58>





History of Present Illness





- History of Present Illness


History of Present Illness: 


Podiatry consult note for Dr. Hobbs





57 year old male with PMHx including DM, CHF, hypertension, atrial fibrillation

, elephantiasis, dyslipidemia, was seen today with attending Dr. Hobbs 

regarding  right lower extremity ulcerations and bilateral lower extremity 

chronic edema. Patient is resting comfortably in bed. Bilateral lower extremity 

bandages are clean dry and intact. No new complaints at this time. Patient 

denies any n/v/f/c/sob/cp. 





Past Patient History





- Infectious Disease


Hx of Infectious Diseases: None, MRSA





- Tetanus Immunizations


Tetanus Immunization: Unknown





- Past Medical History & Family History


Past Medical History?: Yes





- Past Social History


Smoking Status: Former Smoker





- CARDIAC


Hx Cardiac Disorders: Yes (Afib)


Hx Congestive Heart Failure: Yes


Hx Hypertension: Yes





- PULMONARY


Hx Chronic Obstructive Pulmonary Disease (COPD): No





- NEUROLOGICAL


Other/Comment: Epilepsy





- HEENT


Hx HEENT Problems: Yes


Other/Comment: wears glasses





- RENAL


Hx Renal Failure: No





- ENDOCRINE/METABOLIC


Hx Diabetes Mellitus Type 2: Yes





- HEMATOLOGICAL/ONCOLOGICAL


Hx Blood Disorders: No


Hx AIDS: No


Hx Anemia: No


Hx Cancer: No


Hx Chemotherapy: No


Hx Cirrhosis: No


Hx Hepatitis A: No


Hx Hepatitis B: No


Hx Hepatitis C: No


Hx Metastesis: No


Hx Shingles: No


Hx Unexplained Bleeding: No


Other/Comment: blood infection





- INTEGUMENTARY


Hx Dermatological Problems: Yes


Hx Basil Cell: No


Hx Eczema: No


Hx Melanoma: No


Hx Psoriasis: No


Hx Squamous Cell: No


Other/Comment: multiple wounds on the rt leg, cellulitis in b/l extreminity. 

Left thigh cellulitis





- MUSCULOSKELETAL/RHEUMATOLOGICAL


Hx Falls: Yes





- GASTROINTESTINAL


Hx Gastrointestinal Disorders: No


Hx Colostomy: No


Hx Crohn's Disease: No


Hx Diverticulitis: No


Hx Gall Bladder Disease: No


Hx Gastroesophageal Reflux: No


Hx Ileostomy: No


Hx Liver Failure: No


Hx Pancreatitis: No


HX Swallowing Problems: No





- GENITOURINARY/GYNECOLOGICAL


Hx Reproductive Disorders: No





- PSYCHIATRIC


Hx Psychophysiologic Disorder: Yes


Hx Substance Use: No


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





- SURGICAL HISTORY


Hx Amputation: No


Hx Appendectomy: No


Hx Cholecystectomy: No


Hx Gastric Bypass Surgery: No


Hx Hysterectomy: No


Hx Joint Replacement: No


Hx Kidney Transplant: No


Hx Liver Transplant: No


Hx Mastectomy: No


Hx Musculoskeletal Surgery: No


Hx Open Heart Surgery: No


Hx Orthopedic Surgery: No


Hx Splenectomy: No


Hx Valve Replacement: No





- ANESTHESIA


Hx Anesthesia: Yes


Hx Anesthesia Reactions: No


Hx Malignant Hyperthermia: No





Meds


Allergies/Adverse Reactions: 


 Allergies











Allergy/AdvReac Type Severity Reaction Status Date / Time


 


No Known Allergies Allergy   Verified 08/21/17 16:25














- Medications


Medications: 


 Current Medications





Apixaban (Eliquis)  2.5 mg PO BID [facility]


   PRN Reason: Protocol


   Last Admin: 08/18/17 18:25 Dose:  2.5 mg


Atorvastatin Calcium (Lipitor)  10 mg PO DAILY [facility]


   Last Admin: 08/18/17 12:57 Dose:  10 mg


Diltiazem HCl (Cardizem Cd)  120 mg PO DAILY [facility]


   Last Admin: 08/18/17 12:56 Dose:  120 mg


Furosemide (Lasix)  20 mg PO DAILY [facility]


   Last Admin: 08/18/17 12:56 Dose:  20 mg


Gabapentin (Neurontin)  400 mg PO TID [facility]


   PRN Reason: Protocol


   Last Admin: 08/18/17 17:02 Dose:  400 mg


Glipizide (Glucotrol)  10 mg PO BID [facility]


   Last Admin: 08/18/17 17:02 Dose:  10 mg


Cefepime HCl (Maxipime 1gm)  1 gm in 100 mls @ 100 mls/hr IVPB Q8 [facility]


   PRN Reason: Protocol


   Last Admin: 08/19/17 05:46 Dose:  100 mls/hr


Vancomycin HCl (Vancomycin 1gm)  1 gm in 250 mls @ 167 mls/hr IVPB Q12 [facility]


   PRN Reason: Protocol


   Last Admin: 08/18/17 22:45 Dose:  167 mls/hr


Insulin Human Regular (Humulin R Med)  0 units SC ACHS [facility]


   PRN Reason: Protocol


   Last Admin: 08/19/17 09:09 Dose:  3 units


Metformin HCl (Glucophage)  1,000 mg PO BID [facility]


   Last Admin: 08/18/17 17:02 Dose:  1,000 mg


Pantoprazole Sodium (Protonix Ec Tab)  40 mg PO 0600 [facility]


   Last Admin: 08/19/17 05:46 Dose:  40 mg


Pioglitazone HCl (Actos)  45 mg PO DAILY [facility]


   Last Admin: 08/18/17 14:20 Dose:  45 mg


Potassium Chloride (K-Dur 20 Meq Er Tab)  20 meq PO DAILY [facility]


   Last Admin: 08/18/17 12:57 Dose:  20 meq


Ramipril (Altace)  10 mg PO DAILY [facility]


   Last Admin: 08/18/17 12:56 Dose:  10 mg











Physical Exam





- Constitutional


Appears: Non-toxic, No Acute Distress





- Extremities Exam


Additional comments: 


Lower extremity focused exam:





VASC: Diffuse edema of bilateral lower extremities. Pedal pulses non-palpable 

due to edema.  Skin temperature warm to warm from proximal to distal b/l





NEURO: Gross sensation diminished b/l





DERM: Open ulceration noted to right lower extremity measuring approximately 2 

cm by 1.5 cm by 0.2 cm with granular base at the lateral aspect of the leg with 

scant amount of sanguinous drainage, no purulence, no erythema, mild malodor. 

No open ulcerations noted to the left lower extremity.  Skin is diffusely 

xerotic with hyperpigmentation. Skin changes of chronic edema noted.





ORTHO: No tenderness on palpation to the lower extremities





- Neurological Exam


Neurological exam: Alert, Oriented x3





- Psychiatric Exam


Psychiatric exam: Normal Affect, Normal Mood





Results





- Vital Signs


Recent Vital Signs: 


 Last Vital Signs











Temp  98.5 F   08/19/17 08:03


 


Pulse  80   08/19/17 08:03


 


Resp  18   08/19/17 08:03


 


BP  133/67   08/19/17 08:03


 


Pulse Ox  95   08/19/17 08:03














- Labs


Result Diagrams: 


 08/18/17 08:25





 08/18/17 08:25


Labs: 


 Laboratory Results - last 24 hr











  08/18/17 08/18/17 08/18/17





  10:50 16:34 21:32


 


POC Glucose (mg/dL)  306 H  302 H  229 H














Assessment & Plan





- Assessment and Plan (Free Text)


Assessment: 


57 year old male with diabetes and chronic lower extremity lymphedema with 

right leg ulceration


Plan: 


Patient examined and evaluated at bedside with attending Dr. Hobbs


chart, labs, vitals reviewed- afebrile,WBC 14.4


Right leg wound culture obtained


Right LE dressed with xeroform, 4x4, ABD, ACE


Left LE dressed with ACE


Lotrisone ordered to be applied to legs daily


Patient to continue IV abx per ID


Podiatry will continue to follow


Pt will f/u in the wound care center after DC 





<Manuel Hobbs - Last Filed: 08/22/17 11:51>





Results





- Vital Signs


Recent Vital Signs: 


 Last Vital Signs











Temp  98.1 F   08/21/17 07:34


 


Pulse  69   08/21/17 10:37


 


Resp  20   08/21/17 07:34


 


BP  132/68   08/21/17 10:38


 


Pulse Ox  96   08/21/17 07:34














- Labs


Result Diagrams: 


 08/21/17 08:49





 08/21/17 08:49


Labs: 


 Laboratory Results - last 24 hr











  08/21/17 08/21/17 08/21/17





  11:09 16:23 16:59


 


POC Glucose (mg/dL)  286 H  111 H  167 H














Attending/Attestation





- Attestation


I have personally seen and examined this patient.: Yes


I have fully participated in the care of the patient.: Yes


I have reviewed all pertinent clinical information: Yes
History of Present Illness





- History of Present Illness


History of Present Illness: 





Patient is a 57 year old male PMH CAD, HTN, Epilepsy, and chronic leg ulcers 

[place] [facility] [place] with complaints of abdominal pain found to 

have renal and ureteral stones, and leg ulcers. History was obtained from 

patient's mother; patient unable to verbally communicate. According to mother, 

patient was diagnosed with Elephantiasis 10 years ago which extended from his 

lower legs bilaterally to his feet, after 5 years of treatment with Dr. Ramirez

, patient's condition improved on his legs but still pursued on his feet 

bilaterally due to patient no longer being able to obtain full length leg 

wrappings because of insurance. Patient also suffers from ulcerative wounds and 

skin growths on lower extremities bilaterally. As per patient's mother, wounds 

tend to both drain and bleed chronically. Patient wounds are taken care of 

three times a week at patient's home by both travel nurses and mother. Wounds 

are painful for patient. Patient is able to freely move extremities. 





PMH: See above


PMD: Dr. Peraza


PSHx: Inguinal hernia repair 


 





Past Patient History





- Infectious Disease


Hx of Infectious Diseases: None, MRSA





- Tetanus Immunizations


Tetanus Immunization: Unknown





- Past Medical History & Family History


Past Medical History?: Yes





- Past Social History


Smoking Status: Never Smoked





- CARDIAC


Hx Cardiac Disorders: Yes (Afib)


Hx Cardia Arrhythmia: Yes


Hx Circulatory Problems: Yes


Hx Congestive Heart Failure: Yes


Hx Hypercholesterolemia: Yes


Hx Hypertension: Yes


Hx Peripheral Edema: Yes


Hx Peripheral Vascular Disease: Yes





- PULMONARY


Hx Respiratory Disorders: No


Hx Chronic Obstructive Pulmonary Disease (COPD): No





- NEUROLOGICAL


Hx Neurological Disorder: Yes (NEUROPATHY)


Other/Comment: Epilepsy





- HEENT


Hx HEENT Problems: Yes


Other/Comment: wears glasses





- RENAL


Hx Chronic Kidney Disease: Yes (2 URETERAL STONE,L NEPHROLITHIASIS)


Hx Renal Failure: No





- ENDOCRINE/METABOLIC


Hx Endocrine Disorders: Yes


Hx Diabetes Mellitus Type 2: Yes





- HEMATOLOGICAL/ONCOLOGICAL


Hx Blood Disorders: Yes (MRSA 2016 LEG WOUND)


Hx AIDS: No


Hx Anemia: No


Hx Cancer: No


Hx Chemotherapy: No


Hx Cirrhosis: No


Hx Hepatitis A: No


Hx Hepatitis B: No


Hx Hepatitis C: No


Hx Metastesis: No


Hx Shingles: No


Hx Unexplained Bleeding: No


Other/Comment: blood infection





- INTEGUMENTARY


Hx Dermatological Problems: Yes


Hx Basil Cell: No


Hx Eczema: No


Hx Melanoma: No


Hx Psoriasis: No


Hx Squamous Cell: No


Other/Comment: multiple wounds on the rt leg, cellulitis in b/l extreminity. 

Left thigh cellulitis





- MUSCULOSKELETAL/RHEUMATOLOGICAL


Hx Musculoskeletal Disorders: Yes


Hx Falls: Yes


Hx Unsteady Gait: Yes





- GASTROINTESTINAL


Hx Gastrointestinal Disorders: Yes


Hx Colostomy: No


Hx Crohn's Disease: No


Hx Diverticulitis: No


Hx Gall Bladder Disease: No


Hx Gastroesophageal Reflux: Yes


Hx Ileostomy: No


Hx Liver Failure: No


Hx Pancreatitis: No


HX Swallowing Problems: No





- GENITOURINARY/GYNECOLOGICAL


Hx Genitourinary Disorders: No


Hx Hematuria: No


Hx Incontinence: No


Hx Prostate Problems: No


Hx Sexually Transmitted Disorders: No


Hx Urinary Tract Infection: No





- PSYCHIATRIC


Hx Psychophysiologic Disorder: Yes


Hx Substance Use: No


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





- SURGICAL HISTORY


Hx Surgeries:  (VEIN STRIPPED 2001)


Hx Amputation: No


Hx Appendectomy: No


Hx Cholecystectomy: No


Hx Gastric Bypass Surgery: No


Hx Hysterectomy: No


Hx Joint Replacement: No


Hx Kidney Transplant: No


Hx Liver Transplant: No


Hx Mastectomy: No


Hx Musculoskeletal Surgery: No


Hx Open Heart Surgery: No


Hx Orthopedic Surgery: No


Hx Splenectomy: No


Hx Valve Replacement: No





- ANESTHESIA


Hx Anesthesia: Yes


Hx Anesthesia Reactions: No


Hx Malignant Hyperthermia: No





Meds


Allergies/Adverse Reactions: 


 Allergies











Allergy/AdvReac Type Severity Reaction Status Date / Time


 


No Known Allergies Allergy   Verified 08/18/17 12:16














- Medications


Medications: 


 Current Medications





Apixaban (Eliquis)  2.5 mg PO BID [facility]


   PRN Reason: Protocol


Atorvastatin Calcium (Lipitor)  10 mg PO DAILY [facility]


   Last Admin: 08/18/17 12:57 Dose:  10 mg


Diltiazem HCl (Cardizem Cd)  120 mg PO DAILY [facility]


   Last Admin: 08/18/17 12:56 Dose:  120 mg


Furosemide (Lasix)  20 mg PO DAILY [facility]


   Last Admin: 08/18/17 12:56 Dose:  20 mg


Gabapentin (Neurontin)  400 mg PO TID [facility]


   PRN Reason: Protocol


Glipizide (Glucotrol)  10 mg PO BID [facility]


Insulin Human Regular (Humulin R Med)  0 units SC ACHS [facility]


   PRN Reason: Protocol


Metformin HCl (Glucophage)  1,000 mg PO BID [facility]


Pioglitazone HCl (Actos)  45 mg PO DAILY [facility]


   Last Admin: 08/18/17 14:20 Dose:  45 mg


Potassium Chloride (K-Dur 20 Meq Er Tab)  20 meq PO DAILY [facility]


   Last Admin: 08/18/17 12:57 Dose:  20 meq


Ramipril (Altace)  10 mg PO DAILY [facility]


   Last Admin: 08/18/17 12:56 Dose:  10 mg











Physical Exam





- Constitutional


Appears: Well





- Head Exam


Head Exam: ATRAUMATIC, NORMAL INSPECTION, NORMOCEPHALIC





- Eye Exam


Eye Exam: Normal appearance





- ENT Exam


ENT Exam: Mucous Membranes Moist, Normal Exam





- Neck Exam


Neck exam: Positive for: Normal Inspection





- Respiratory Exam


Respiratory Exam: Clear to Auscultation Bilateral, NORMAL BREATHING PATTERN





- Cardiovascular Exam


Cardiovascular Exam: REGULAR RHYTHM, +S1, +S2





- GI/Abdominal Exam


GI & Abdominal Exam: Normal Bowel Sounds, Soft





- Extremities Exam


Extremities exam: Positive for: joint swelling, tenderness


Additional comments: 


Lower extremities are dry and scale-like bilaterally, edematous, and draining. 








- Neurological Exam


Neurological exam: Alert, Oriented x3





- Skin


Additional comments: 


Chronic lymphatic and venous stasis bilaterally





Results





- Vital Signs


Recent Vital Signs: 


 Last Vital Signs











Temp  98.4 F   08/18/17 12:19


 


Pulse  78   08/18/17 12:56


 


Resp  18   08/18/17 12:19


 


BP  137/70   08/18/17 12:56


 


Pulse Ox  99   08/18/17 11:19














- Labs


Result Diagrams: 


 08/18/17 08:25





 08/18/17 08:25


Labs: 


 Laboratory Results - last 24 hr











  08/18/17





  10:50


 


POC Glucose (mg/dL)  306 H














Assessment & Plan





- Assessment and Plan (Free Text)


Assessment: 





Chronic lymphatic edematous and venous stasis changes


- Full knee wrappings applied


- Elevation of lower extremities


- Will continue to change dressings daily





Follow up with recs as per Dr. Marshall
medications

## 2018-04-11 ENCOUNTER — HOSPITAL ENCOUNTER (EMERGENCY)
Dept: HOSPITAL 42 - ED | Age: 58
Discharge: HOME | End: 2018-04-11
Payer: MEDICARE

## 2018-04-11 VITALS
TEMPERATURE: 98.1 F | OXYGEN SATURATION: 99 % | RESPIRATION RATE: 16 BRPM | DIASTOLIC BLOOD PRESSURE: 80 MMHG | SYSTOLIC BLOOD PRESSURE: 160 MMHG | HEART RATE: 78 BPM

## 2018-04-11 VITALS — BODY MASS INDEX: 38.4 KG/M2

## 2018-04-11 DIAGNOSIS — Z79.01: ICD-10-CM

## 2018-04-11 DIAGNOSIS — E11.65: Primary | ICD-10-CM

## 2018-04-11 DIAGNOSIS — Z87.891: ICD-10-CM

## 2018-04-11 DIAGNOSIS — I10: ICD-10-CM

## 2018-04-11 DIAGNOSIS — Z79.4: ICD-10-CM

## 2018-04-11 DIAGNOSIS — I48.91: ICD-10-CM

## 2018-04-11 LAB
ALBUMIN SERPL-MCNC: 4.4 G/DL (ref 3–4.8)
ALBUMIN/GLOB SERPL: 1.2 {RATIO} (ref 1.1–1.8)
ALT SERPL-CCNC: 37 U/L (ref 7–56)
APPEARANCE UR: CLEAR
APTT BLD: 33.6 SECONDS (ref 25.1–36.5)
ARTERIAL BLOOD GAS O2 SAT: 94.4 % (ref 95–98)
ARTERIAL BLOOD GAS PCO2: 40 MM/HG (ref 35–45)
ARTERIAL BLOOD GAS TCO2: 26 MMOL.L (ref 22–28)
AST SERPL-CCNC: 23 U/L (ref 17–59)
BASE EXCESS BLDV CALC-SCNC: 1.1 MMOL/L (ref 0–2)
BASOPHILS # BLD AUTO: 0.03 K/MM3 (ref 0–2)
BASOPHILS NFR BLD: 0.3 % (ref 0–3)
BILIRUB UR-MCNC: NEGATIVE MG/DL
BUN SERPL-MCNC: 20 MG/DL (ref 7–21)
CALCIUM SERPL-MCNC: 10.7 MG/DL (ref 8.4–10.5)
COLOR UR: (no result)
EOSINOPHIL # BLD: 0.1 10*3/UL (ref 0–0.7)
EOSINOPHIL NFR BLD: 1.4 % (ref 1.5–5)
ERYTHROCYTE [DISTWIDTH] IN BLOOD BY AUTOMATED COUNT: 12.7 % (ref 11.5–14.5)
GFR NON-AFRICAN AMERICAN: > 60
GLUCOSE UR STRIP-MCNC: >=1000 MG/DL
GRANULOCYTES # BLD: 7.55 10*3/UL (ref 1.4–6.5)
GRANULOCYTES NFR BLD: 74.7 % (ref 50–68)
HCO3 BLDA-SCNC: 24.8 MMOL/L (ref 21–28)
HGB BLD-MCNC: 14.7 G/DL (ref 14–18)
INHALED O2 CONCENTRATION: 21 %
INR PPP: 1.04 (ref 0.93–1.08)
LEUKOCYTE ESTERASE UR-ACNC: NEGATIVE LEU/UL
LYMPHOCYTES # BLD: 1.6 10*3/UL (ref 1.2–3.4)
LYMPHOCYTES NFR BLD AUTO: 15.9 % (ref 22–35)
MCH RBC QN AUTO: 29.6 PG (ref 25–35)
MCHC RBC AUTO-ENTMCNC: 33.6 G/DL (ref 31–37)
MCV RBC AUTO: 88.1 FL (ref 80–105)
MONOCYTES # BLD AUTO: 0.8 10*3/UL (ref 0.1–0.6)
MONOCYTES NFR BLD: 7.7 % (ref 1–6)
PH BLDA: 7.4 [PH] (ref 7.35–7.45)
PH BLDV: 7.32 [PH] (ref 7.32–7.43)
PH UR STRIP: 6 [PH] (ref 4.7–8)
PLATELET # BLD: 280 10^3/UL (ref 120–450)
PMV BLD AUTO: 10.2 FL (ref 7–11)
PO2 BLDA: 64 MM/HG (ref 80–100)
PROT UR STRIP-MCNC: NEGATIVE MG/DL
PROTHROMBIN TIME: 11.9 SECONDS (ref 9.4–12.5)
RBC # BLD AUTO: 4.96 10^6/UL (ref 3.5–6.1)
RBC # UR STRIP: NEGATIVE /UL
SP GR UR STRIP: 1.01 (ref 1–1.03)
TROPONIN I SERPL-MCNC: < 0.01 NG/ML
UROBILINOGEN UR STRIP-ACNC: 0.2 E.U./DL
VENOUS BLOOD FIO2: 21 %
VENOUS BLOOD GAS PCO2: 55 (ref 40–60)
VENOUS BLOOD GAS PO2: 36 MM/HG (ref 30–55)
WBC # BLD AUTO: 10.1 10^3/UL (ref 4.5–11)

## 2018-04-11 PROCEDURE — 85730 THROMBOPLASTIN TIME PARTIAL: CPT

## 2018-04-11 PROCEDURE — 85025 COMPLETE CBC W/AUTO DIFF WBC: CPT

## 2018-04-11 PROCEDURE — 93005 ELECTROCARDIOGRAM TRACING: CPT

## 2018-04-11 PROCEDURE — 82948 REAGENT STRIP/BLOOD GLUCOSE: CPT

## 2018-04-11 PROCEDURE — 82550 ASSAY OF CK (CPK): CPT

## 2018-04-11 PROCEDURE — 85610 PROTHROMBIN TIME: CPT

## 2018-04-11 PROCEDURE — 84484 ASSAY OF TROPONIN QUANT: CPT

## 2018-04-11 PROCEDURE — 82803 BLOOD GASES ANY COMBINATION: CPT

## 2018-04-11 PROCEDURE — 96360 HYDRATION IV INFUSION INIT: CPT

## 2018-04-11 PROCEDURE — 99283 EMERGENCY DEPT VISIT LOW MDM: CPT

## 2018-04-11 PROCEDURE — 96361 HYDRATE IV INFUSION ADD-ON: CPT

## 2018-04-11 PROCEDURE — 81003 URINALYSIS AUTO W/O SCOPE: CPT

## 2018-04-11 PROCEDURE — 80053 COMPREHEN METABOLIC PANEL: CPT

## 2018-04-11 PROCEDURE — 71045 X-RAY EXAM CHEST 1 VIEW: CPT

## 2018-04-11 PROCEDURE — 83615 LACTATE (LD) (LDH) ENZYME: CPT

## 2018-04-11 NOTE — ED PDOC
Arrival/HPI





- General


Historian: Patient, Parent


EM Caveat: Dementia





- History of Present Illness


Time/Duration: < week





<Sha Martinez - Last Filed: 04/11/18 16:46>





<Gilberto Fernandes - Last Filed: 04/11/18 22:30>





- General


Chief Complaint: High Blood Sugar


Time Seen by Provider: 04/11/18 13:18





- History of Present Illness


Narrative History of Present Illness (Text): 





04/11/18 14:25


59 yo M with PMH of insulin-dependant diabetes, epilepsy (with abscence seizures

), CAD, afib on eliquis and chronic leg ulcers who presents to [facility] ED with his 

mother, sent by his PMD for blood glucose reading in the office >500. Mom 

reports that for the past couple days, patient has been "listless" and 

urinating more than normal, but otherwise normal. No recent seizures that she's 

noticed, but she says she would not know. Denies cough, fever, chills, 

rhinorrhea, nausea, vomiting, diarrhea, constipation. Patient has wound care 

nurses who visit and change the dressings on his legs twice weekly, and he 

follows up with Dr. Garcia for wound care who he saw two weeks ago. He denies 

pain/ulcer/drainage from his legs. He denies dysuria, hematuria, headache, 

confusion. Patient has been compliant with his medications, takes 30u lantus 

nightly and sliding scale coverage three times daily. For the past two days, 

his premeal blood sugar has been consistently high, 300's-400's. (Sha Martinez

)





Past Medical History





- Provider Review


Nursing Documentation Reviewed: Yes





- Travel History


Have you recently traveled outside [place] w/in the past 3 mons?: No





- Infectious Disease


Hx of Infectious Diseases: MRSA





- Tetanus Immunization


Tetanus Immunization: Unknown





- Cardiac


Hx Cardiac Disorders: Yes


Hx Atrial Fibrillation: Yes


Hx Congestive Heart Failure: Yes


Hx Hypertension: Yes





- Pulmonary


Hx Chronic Obstructive Pulmonary Disease (COPD): No





- Neurological


Hx Seizures: Yes


Other/Comment: Epilepsy





- HEENT


Hx HEENT Disorder: Yes


Other/Comment: wears glasses





- Renal


Hx Renal Failure: No





- Endocrine/Metabolic


Hx Diabetes Mellitus Type 2: Yes





- Hematological/Oncological


Hx Blood Disorders: No


Other/Comment: blood infection





- Integumentary


Hx Dermatological Disorder: Yes


Other/Comment: multiple wounds on the rt leg, cellulitis in b/l extreminity. 

Left thigh cellulitis





- Musculoskeletal/Rheumatological


Hx Falls: Yes





- Gastrointestinal


Hx Gastrointestinal Disorders: Yes





- Genitourinary/Gynecological


Hx Genitourinary Disorders: No





- Psychiatric


Hx Psychophysiologic Disorder: Yes


Hx Substance Use: No


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





- Surgical History


Hx Amputation: No


Hx Appendectomy: No


Hx Cholecystectomy: No


Hx Gastric Bypass Surgery: No


Hx Hysterectomy: No


Hx Joint Replacement: No


Hx Kidney Transplant: No


Hx Liver Transplant: No


Hx Mastectomy: No


Hx Musculoskeletal Surgery: No


Hx Open Heart Surgery: No


Hx Orthopedic Surgery: No


Hx Splenectomy: No


Hx Valve Replacement: No





- Anesthesia


Hx Anesthesia: Yes


Hx Anesthesia Reactions: No


Hx Malignant Hyperthermia: No





- Suicidal Assessment


Feels Threatened In Home Enviroment: No





<Sha Martinez - Last Filed: 04/11/18 16:46>





<Gilberto Fernandes - Last Filed: 04/11/18 22:30>





- Patient History


Narrative Patient History: 





insulin-dependant diabetes, epilepsy (with abscence seizures), CAD, afib on 

eliquis and chronic leg ulcers (Sha Martinez)





Family/Social History





- Physician Review


Nursing Documentation Reviewed: Yes


Family/Social History: Unknown Family HX


Smoking Status: Former Smoker


Hx Alcohol Use: No


Hx Substance Use: No


Hx Substance Use Treatment: No





<Sha Martinez - Last Filed: 04/11/18 16:46>





Allergies/Home Meds





<Sha Martinez - Last Filed: 04/11/18 16:46>





<Gilberto Fernandes - Last Filed: 04/11/18 22:30>


Allergies/Adverse Reactions: 


Allergies





No Known Allergies Allergy (Verified 04/11/18 13:02)


 








Home Medications: 


 Home Meds











 Medication  Instructions  Recorded  Confirmed


 


Apixaban [Eliquis] 2.5 mg PO BID 08/18/17 08/21/17


 


Atorvastatin [Lipitor] 10 mg PO DAILY 08/18/17 08/21/17


 


Diltiazem HCl [Diltiazem ER] 120 mg PO DAILY 08/18/17 08/21/17


 


Ferrous Fum/Vit C/B12/Stomc [Ziks 1 cap PO DAILY 08/18/17 08/21/17





Hematogen]   


 


Furosemide [Lasix] 20 mg PO DAILY 08/18/17 08/21/17


 


Gabapentin [Neurontin] 400 mg PO TID 08/18/17 08/21/17


 


GlipiZIDE [Glipizide] 10 mg PO BID 08/18/17 08/21/17


 


Insulin Lispro [Humalog Kwikpen 1 unit SQ ACHS 08/18/17 08/21/17





U-100]   


 


Metformin HCl [Glucophage] 1,000 mg PO BID 08/18/17 08/21/17


 


Omeprazole 20 mg PO DAILY 08/18/17 08/21/17


 


Pioglitazone HCl 45 mg PO DAILY 08/18/17 08/21/17


 


Potassium Chloride [Klor-Con M20] 20 meq PO DAILY 08/18/17 08/21/17


 


Ramipril [Altace] 10 mg PO DAILY 08/18/17 08/21/17














Review of Systems





- Review of Systems


Systems not reviewed;Unavailable: Dementia


Constitutional: Normal


Eyes: Normal


ENT: Normal


Respiratory: Normal


Cardiovascular: Normal


Gastrointestinal: Normal


Genitourinary Male: Frequency


Musculoskeletal: Normal


Skin: Normal


Neurological: Normal


Endocrine: Normal


Hemo/Lymphatic: Normal


Psychiatric: Normal





<Sha Martinez - Last Filed: 04/11/18 16:46>





Physical Exam


Vital Signs Reviewed: Yes


Temperature: Afebrile


Blood Pressure: Hypertensive


Pulse: Regular


Respiratory Rate: Normal


Appearance: Positive for: Non-Toxic, Comfortable, Other (chronically ill)


Pain Distress: None


Mental Status: Positive for: Alert and Oriented X 3


Finger Stick Blood Glucose: 385





- Systems Exam


Head: Present: Atraumatic, Normocephalic


Pupils: Present: PERRL


Extroacular Muscles: Present: EOMI


Conjunctiva: Present: Normal


Mouth: Present: Other (Large tongue. Poor dentition. Dry mucous membranes)


Neck: Present: Normal Range of Motion


Respiratory/Chest: Present: Clear to Auscultation, Decreased Breath Sounds.  No

: Good Air Exchange, Accessory Muscle Use


Cardiovascular: Present: Regular Rate and Rhythm, Normal S1, S2


Abdomen: Present: Normal Bowel Sounds, Other (Large ventral hernia).  No: 

Tenderness, Distention, Peritoneal Signs


Upper Extremity: Present: Normal Inspection.  No: Cyanosis, Edema


Lower Extremity: Present: Other (Pressure dressing CDI toes to knees. Bilateral 

gross lymphedema posterior thighs)


Neurological: Present: GCS=15, CN II-XII Intact


Skin: Present: Warm, Dry, Normal Color


Psychiatric: Present: Alert, Oriented x 3





<Sha Martinez - Last Filed: 04/11/18 16:46>





Vital Signs











  Temp Pulse Resp BP Pulse Ox


 


 04/11/18 17:05  98.1 F  78  16  160/80 H  99


 


 04/11/18 13:27  98.4 F  95 H  17  142/87  97














Medical Decision Making





- Lab Interpretations


I have reviewed the lab results: Yes





<Sha Martinez - Last Filed: 04/11/18 16:46>





- EKG Interpretation


Interpreted by ED Physician: Yes


Type: 12 lead EKG





<DenaGilberto - Last Filed: 04/11/18 22:30>


ED Course and Treatment: 





04/11/18 14:38


Patient is hyperglycemic, with urinary frequency without dysuria. 


Ordered CBC, CMP, PT/PTT, Cardiac ISO, VBG with lactate, CXR, EKG, and UA


Ordered 500cc IVF bolus





04/11/18 16:07


Labs remarkable for persistent hyperglycemia. Ordered 500cc IVF bolus and 10u 

regular insulin 


Chest X-ray shows cardiomegaly, no inteval disease. VBG lactate elevated, but 

ABG lactate WNL. Urinalysis normal. EKG normal. Remainder of labs WNL





Accucheck after insulin and IVF improved to 300. Will discuss with PMD





04/11/18 16:46


Discussed with patient's PMD Dr. Peraza, who instructed patient to continue 

accucheck Q4 at home, and patient will follow up with him in two days, this 

Friday. Patient agreeable to go home. Discussed sliding scale and frequent 

accucheck at home. Instructed patient to return to Emergency department for any 

new or worsening concerns. (MichelleDimaasiya)








04/11/18 22:28


Patient seen and evaluated with medical resident. Patient denies chest pain or 

shortness of breath. No abdominal pain, no nausea or vomiting.


Patient with history of wound care to legs, on exam there is no acute 

cellulitis noted. NO respiratory symptoms. No vomiting. No diarrhea. Abdomen 

nontender. Tolerating po.


IV fluids given and insulin given.


Blood sugar improving.


Currently exam not consistent with DKA.


Case reviewed with PMD. As patient with no fever, no signs of sepsis, no change 

in mental status, no pain or discomfort, no dka, will d/c with instructions for 

blood sugar management discussed with mother, follow-up with Dr. Peraza. ([facility])





- Lab Interpretations


Lab Results: 











 04/11/18 13:10 





 04/11/18 13:10 





 Lab Results





04/11/18 15:53: POC Glucose (mg/dL) 301 H


04/11/18 15:25: POC Glucose (mg/dL) 285 H


04/11/18 13:55: pCO2 40, pO2 64.0 L, HCO3 24.8, ABG pH 7.40, ABG Total CO2 26.0

, ABG O2 Saturation 94.4 L, ABG Base Excess 0.0, ABG Potassium 3.9, Sodium 132.0

, Chloride 103.0, Glucose 467 H*, Lactate 1.7, FiO2 21.0, Arterial Blood 

Potassium 3.9


04/11/18 13:50: Urine Color Light yellow, Urine Appearance Clear, Urine pH 6.0, 

Ur Specific Gravity 1.010, Urine Protein Negative, Urine Glucose (UA) >=1000, 

Urine Ketones Negative, Urine Blood Negative, Urine Nitrate Negative, Urine 

Bilirubin Negative, Urine Urobilinogen 0.2, Ur Leukocyte Esterase Negative


04/11/18 13:10: Sodium 137, Chloride 96 L, Potassium 4.6, Carbon Dioxide 28, 

Anion Gap 17, BUN 20, Creatinine 0.8, Est GFR (African Amer) > 60, Est GFR (Non-

Af Amer) > 60, Random Glucose 540 H* D, Calcium 10.7 H, Total Bilirubin 0.8, 

AST 23, ALT 37, Alkaline Phosphatase 131 H, Lactate Dehydrogenase 429, Total 

Creatine Kinase 50, Troponin I < 0.01, Total Protein 8.1, Albumin 4.4, Globulin 

3.7, Albumin/Globulin Ratio 1.2


04/11/18 13:10: pO2 36, VBG pH 7.32, VBG pCO2 55.0, VBG HCO3 28.3 H, VBG Total 

CO2 30.0 H, VBG O2 Sat (Calc) 72.5 H, VBG Base Excess 1.1, VBG Potassium 4.4, 

Sodium 132.0, Chloride 99.0, Glucose 540 H* D, Lactate 2.4 H, FiO2 21.0, Venous 

Blood Potassium 4.4


04/11/18 13:10: PT 11.9, INR 1.04, APTT 33.6


04/11/18 13:10: WBC 10.1, RBC 4.96, Hgb 14.7, Hct 43.7, MCV 88.1, MCH 29.6, 

MCHC 33.6, RDW 12.7, Plt Count 280, MPV 10.2, Gran % 74.7 H, Lymph % (Auto) 

15.9 L, Mono % (Auto) 7.7 H, Eos % (Auto) 1.4 L, Baso % (Auto) 0.3, Gran # 7.55 

H, Lymph # (Auto) 1.6, Mono # (Auto) 0.8 H, Eos # (Auto) 0.1, Baso # (Auto) 0.03


04/11/18 13:06: POC Glucose (mg/dL) 385 H











- RAD Interpretation


Radiology Orders: 











04/11/18 13:34


CHEST PORTABLE [RAD] Stat 














- EKG Interpretation


EKG Interpretation (Text): 





EKG at 15:48 normal sinus rhythm rate of 91 with no acute st elevations


 (Gilberto Fernandes)





- Medication Orders


Current Medication Orders: 














Discontinued Medications





Sodium Chloride (Sodium Chloride 0.9%)  500 mls @ 1,000 mls/hr IV .Q30M STA


   Stop: 04/11/18 14:05


   Last Admin: 04/11/18 13:48  Dose: 1,000 mls/hr





eMAR Start Stop


 Document     04/11/18 13:48  LMC  (Rec: 04/11/18 13:48  LMC  [id number])


     Intravenous Solution


      Start Date                                 04/11/18


      Start Time                                 13:48


      End Date                                   04/11/18


      End time                                   14:20


      Total Infusion Time                        32





Sodium Chloride (Sodium Chloride 0.9%)  500 mls @ 999 mls/hr IV .Q31M STA


   Stop: 04/11/18 14:47


   Last Admin: 04/11/18 14:25  Dose: 999 mls/hr





eMAR Start Stop


 Document     04/11/18 14:25  LMC  (Rec: 04/11/18 14:25  LMC  [id number])


     Intravenous Solution


      Start Date                                 04/11/18


      Start Time                                 14:25


      End Date                                   04/11/18


      End time                                   15:00


      Total Infusion Time                        35





Insulin Human Regular (Humulin R)  10 units SC STAT STA


   Stop: 04/11/18 14:17


   Last Admin: 04/11/18 14:25  Dose: 10 units





MAR Blood Glucose


 Document     04/11/18 14:25  LMC  (Rec: 04/11/18 14:25  LMC  [id number])


     Blood Glucose


      Finger Stick Blood Glucose ()        385


Subcutaneous Administrations


 Document     04/11/18 14:25  LMC  (Rec: 04/11/18 14:25  LMC  [id number])


     Injection Site


      MAR Injection Site                         Left Arm


     Charges for Administration


      # of Subcutaneous Administrations          1














Disposition/Present on Arrival





- Present on Arrival


Any Indicators Present on Arrival: Yes


History of DVT/PE: No


History of Uncontrolled Diabetes: Yes


Urinary Catheter: No


History of Decub. Ulcer: No


History Surgical Site Infection Following: None





- Disposition


Have Diagnosis and Disposition been Completed?: Yes


Disposition Time: 16:51


Patient Plan: Discharge





<Sha Martinez - Last Filed: 04/11/18 16:46>





<Gilberto Fernandes - Last Filed: 04/11/18 22:30>





- Disposition


Diagnosis: 


 Hyperglycemia





Disposition: HOME/ ROUTINE


Condition: GUARDED


Discharge Instructions (ExitCare):  Hyperglycemia, Adult


Referrals: 


Dom Peraza MD [Primary Care Provider] - Follow up with primary


Forms:  wywy (English)

## 2018-04-11 NOTE — RAD
HISTORY:

hyperglycemia  



COMPARISON:

10/13/2016. 



FINDINGS:



LUNGS:

No active pulmonary disease.



PLEURA:

No significant pleural effusion identified, no pneumothorax apparent.



CARDIOVASCULAR:

Cardiomegaly.  No evidence of acute, significant cardiovascular 

disease. 



OSSEOUS STRUCTURES:

No significant abnormalities.



VISUALIZED UPPER ABDOMEN:

Normal.



OTHER FINDINGS:

None.



IMPRESSION:

No active disease. No significant interval change compared to the 

prior examination(s).

## 2018-04-12 NOTE — CARD
--------------- APPROVED REPORT --------------





EKG Measurement

Heart [id number]

SD [id number]

[id number]

[id number]

[id number]



<Conclusion>

Normal sinus rhythm

Normal ECG

No change

## 2018-05-08 ENCOUNTER — HOSPITAL ENCOUNTER (INPATIENT)
Dept: HOSPITAL 42 - ED | Age: 58
LOS: 3 days | Discharge: NURSING, REHAB OR LONG-TERM CARE | DRG: 872 | End: 2018-05-11
Attending: INTERNAL MEDICINE | Admitting: INTERNAL MEDICINE
Payer: MEDICARE

## 2018-05-08 VITALS — BODY MASS INDEX: 35.3 KG/M2

## 2018-05-08 DIAGNOSIS — L03.116: ICD-10-CM

## 2018-05-08 DIAGNOSIS — A41.9: Primary | ICD-10-CM

## 2018-05-08 DIAGNOSIS — I11.0: ICD-10-CM

## 2018-05-08 DIAGNOSIS — E78.5: ICD-10-CM

## 2018-05-08 DIAGNOSIS — F79: ICD-10-CM

## 2018-05-08 DIAGNOSIS — E11.42: ICD-10-CM

## 2018-05-08 DIAGNOSIS — I48.91: ICD-10-CM

## 2018-05-08 DIAGNOSIS — E11.65: ICD-10-CM

## 2018-05-08 DIAGNOSIS — E66.01: ICD-10-CM

## 2018-05-08 DIAGNOSIS — I50.9: ICD-10-CM

## 2018-05-08 DIAGNOSIS — Z79.01: ICD-10-CM

## 2018-05-08 DIAGNOSIS — L03.115: ICD-10-CM

## 2018-05-08 DIAGNOSIS — Z79.4: ICD-10-CM

## 2018-05-08 DIAGNOSIS — N20.0: ICD-10-CM

## 2018-05-08 DIAGNOSIS — I89.0: ICD-10-CM

## 2018-05-08 LAB
ALBUMIN SERPL-MCNC: 4.1 G/DL (ref 3–4.8)
ALBUMIN/GLOB SERPL: 1.3 {RATIO} (ref 1.1–1.8)
ALT SERPL-CCNC: 34 U/L (ref 7–56)
APPEARANCE UR: CLEAR
AST SERPL-CCNC: 30 U/L (ref 17–59)
BACTERIA #/AREA URNS HPF: (no result) /[HPF]
BASE EXCESS BLDV CALC-SCNC: -7.3 MMOL/L (ref 0–2)
BASE EXCESS BLDV CALC-SCNC: 1 MMOL/L (ref 0–2)
BILIRUB UR-MCNC: NEGATIVE MG/DL
BNP SERPL-MCNC: 105 PG/ML (ref 0–450)
BUN SERPL-MCNC: 17 MG/DL (ref 7–21)
BUN SERPL-MCNC: 26 MG/DL (ref 7–21)
CALCIUM SERPL-MCNC: 8.8 MG/DL (ref 8.4–10.5)
CALCIUM SERPL-MCNC: 9.6 MG/DL (ref 8.4–10.5)
COLOR UR: (no result)
EPI CELLS #/AREA URNS HPF: (no result) /HPF (ref 0–5)
ERYTHROCYTE [DISTWIDTH] IN BLOOD BY AUTOMATED COUNT: 12.7 % (ref 11.5–14.5)
GFR NON-AFRICAN AMERICAN: 48
GFR NON-AFRICAN AMERICAN: > 60
GLUCOSE UR STRIP-MCNC: >=1000 MG/DL
HGB BLD-MCNC: 13.7 G/DL (ref 14–18)
LEUKOCYTE ESTERASE UR-ACNC: NEGATIVE LEU/UL
MCH RBC QN AUTO: 29.8 PG (ref 25–35)
MCHC RBC AUTO-ENTMCNC: 33.8 G/DL (ref 31–37)
MCV RBC AUTO: 88.2 FL (ref 80–105)
PH BLDV: 7.32 [PH] (ref 7.32–7.43)
PH BLDV: 7.4 [PH] (ref 7.32–7.43)
PH UR STRIP: 6 [PH] (ref 4.7–8)
PLATELET # BLD: 271 10^3/UL (ref 120–450)
PMV BLD AUTO: 10.4 FL (ref 7–11)
PROT UR STRIP-MCNC: (no result) MG/DL
RBC # BLD AUTO: 4.59 10^6/UL (ref 3.5–6.1)
RBC # UR STRIP: NEGATIVE /UL
RBC #/AREA URNS HPF: NEGATIVE /HPF (ref 0–2)
SP GR UR STRIP: 1.01 (ref 1–1.03)
TROPONIN I SERPL-MCNC: < 0.01 NG/ML
UROBILINOGEN UR STRIP-ACNC: 0.2 E.U./DL
VENOUS BLOOD FIO2: 21 %
VENOUS BLOOD FIO2: 21 %
VENOUS BLOOD GAS PCO2: 35 (ref 40–60)
VENOUS BLOOD GAS PCO2: 42 (ref 40–60)
VENOUS BLOOD GAS PO2: 186 MM/HG (ref 30–55)
VENOUS BLOOD GAS PO2: 74 MM/HG (ref 30–55)
WBC # BLD AUTO: 24.1 10^3/UL (ref 4.5–11)
WBC #/AREA URNS HPF: (no result) /HPF (ref 0–6)

## 2018-05-08 RX ADMIN — INSULIN HUMAN SCH UNITS: 100 INJECTION, SOLUTION PARENTERAL at 07:58

## 2018-05-08 RX ADMIN — DILTIAZEM HYDROCHLORIDE SCH MG: 120 CAPSULE, COATED, EXTENDED RELEASE ORAL at 11:15

## 2018-05-08 RX ADMIN — INSULIN HUMAN SCH UNITS: 100 INJECTION, SOLUTION PARENTERAL at 11:16

## 2018-05-08 RX ADMIN — INSULIN HUMAN SCH UNITS: 100 INJECTION, SOLUTION PARENTERAL at 17:14

## 2018-05-08 RX ADMIN — INSULIN HUMAN SCH UNITS: 100 INJECTION, SOLUTION PARENTERAL at 22:19

## 2018-05-08 NOTE — CP.PCM.CON
History of Present Illness





- History of Present Illness


History of Present Illness: 


58 year old male with PMH of ight leg skin/skin structure infection with 

chronic leg ulcers, growing MRSA and sensitive Klebsiella in 2016, Bilateral 

lower extremity skin and skin structure infection (Enterobacter, Klebsiella 

Oxytoca and Group B Strep, as well as MRSA) which was treated 9/2015; MRSA and 

Pseudomonas cellulitis of left leg in Dec 2015, May and June 2016, and MRSA 

cellulitis in Sept. 2016, chronic lymphedema of the lower extremities, Morbid 

obesity with BMI 40, HTN, DM, history of Strep bacteremia, history of hernia 

surgery, Learning disability was brought in to [facility] by his mother because of 

increasing right leg pain and swelling. The patient also was noted to have 

fever and was looking a little lethargic. There was no note of vomiting, no 

diarrhea, no convulsions, no loss of consciousness. Full ROS is unobtainable 

because of the patient's learning ability. Infectious diseases consult is 

requested to further evaluate and manage.





Review of Systems





- Review of Systems


All systems: reviewed and no additional remarkable complaints except (as per HPI

)





Past Patient History





- Infectious Disease


Hx of Infectious Diseases: None





- Tetanus Immunizations


Tetanus Immunization: Unknown





- Past Medical History & Family History


Past Medical History?: Yes





- Past Social History


Smoking Status: Former Smoker





- CARDIAC


Hx Cardiac Disorders: Yes


Hx Atrial Fibrillation: Yes


Hx Congestive Heart Failure: Yes


Hx Hypertension: Yes





- PULMONARY


Hx Respiratory Disorders: No





- NEUROLOGICAL


Hx Neurological Disorder: Yes


Hx Seizures: Yes


Other/Comment: Epilepsy





- HEENT


Hx HEENT Problems: Yes


Other/Comment: wears glasses





- RENAL


Hx Chronic Kidney Disease: No





- ENDOCRINE/METABOLIC


Hx Endocrine Disorders: Yes


Hx Diabetes Mellitus Type 2: Yes





- HEMATOLOGICAL/ONCOLOGICAL


Hx Blood Disorders: No


Other/Comment: blood infection





- INTEGUMENTARY


Hx Dermatological Problems: Yes


Other/Comment: multiple wounds on the rt leg, cellulitis in b/l extreminity. 

Left thigh cellulitis





- MUSCULOSKELETAL/RHEUMATOLOGICAL


Hx Musculoskeletal Disorders: Yes


Hx Falls: Yes





- GASTROINTESTINAL


Hx Gastrointestinal Disorders: Yes





- GENITOURINARY/GYNECOLOGICAL


Hx Genitourinary Disorders: No





- PSYCHIATRIC


Hx Psychophysiologic Disorder: Yes


Hx Substance Use: No


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





- SURGICAL HISTORY


Hx Amputation: No


Hx Appendectomy: No


Hx Cholecystectomy: No


Hx Gastric Bypass Surgery: No


Hx Hysterectomy: No


Hx Joint Replacement: No


Hx Kidney Transplant: No


Hx Liver Transplant: No


Hx Mastectomy: No


Hx Musculoskeletal Surgery: No


Hx Open Heart Surgery: No


Hx Orthopedic Surgery: No


Hx Splenectomy: No


Hx Valve Replacement: No





- ANESTHESIA


Hx Anesthesia: Yes


Hx Anesthesia Reactions: No


Hx Malignant Hyperthermia: No





Meds


Allergies/Adverse Reactions: 


 Allergies











Allergy/AdvReac Type Severity Reaction Status Date / Time


 


No Known Allergies Allergy   Verified 05/08/18 11:42














- Medications


Medications: 


 Current Medications





Acetaminophen (Tylenol 325mg Tab)  650 mg PO Q4H PRN


   PRN Reason: Fever >100.4 F


   Stop: 05/08/18 12:00


Apixaban (Eliquis)  2.5 mg PO BID CYNDEE


   PRN Reason: Protocol


Atorvastatin Calcium (Lipitor)  10 mg PO HS CYNDEE


Diltiazem HCl (Cardizem Cd)  120 mg PO DAILY CYNDEE


Furosemide (Lasix)  20 mg PO DAILY CYNDEE


Gabapentin (Neurontin)  400 mg PO TID CYNDEE


   PRN Reason: Protocol


Glipizide (Glucotrol)  10 mg PO BID CYNDEE


Insulin Human Regular (Humulin R Med)  0 units SC ACHS CYNDEE


   PRN Reason: Protocol


Pioglitazone HCl (Actos)  45 mg PO DAILY CYNDEE


Ramipril (Altace)  10 mg PO DAILY CYNDEE











Physical Exam





- Constitutional


Appears: Chronically Ill





- Head Exam


Head Exam: NORMAL INSPECTION





- ENT Exam


ENT Exam: Mucous Membranes Moist





- Neck Exam


Neck exam: Negative for: Meningismus





- Respiratory Exam


Respiratory Exam: Decreased Breath Sounds





- Cardiovascular Exam


Cardiovascular Exam: +S1, +S2





- GI/Abdominal Exam


GI & Abdominal Exam: Soft.  absent: Tenderness





- Extremities Exam


Additional comments: 





both legs with dressings in place





Results





- Vital Signs


Recent Vital Signs: 


 Last Vital Signs











Temp  102.1 F H  05/08/18 03:31


 


Pulse  107 H  05/08/18 02:04


 


Resp  19   05/08/18 02:04


 


BP  134/53 L  05/08/18 02:04


 


Pulse Ox  95   05/08/18 02:04














- Labs


Result Diagrams: 


 05/08/18 02:35





 05/08/18 11:15





Assessment & Plan





- Assessment and Plan (Free Text)


Plan: 





Assessment


consider sepsis due to right leg skin and skin structure infection associated 

with chronic leg ulcers


history of MRSA and Klebsiella oxytoca right leg cellulitis


history of sepsis secondary to right leg skin/skin structure infection with 

chronic leg ulcers, growing MRSA and sensitive Klebsiella 


history of MRSA cellulitis Sept. 2016


history of Bilateral lower extremity skin and skin structure infection (

Enterobacter, Klebsiella Oxytoca and Group B Strep, as well as MRSA) which was 

treated 9/2015; MRSA and Pseudomonas cellulitis of left leg in Dec 2015, May 

and June 2016


chronic lymphedema of the lower extremities


Morbid obesity with BMI 55


HTN


DM


history of Strep bacteremia


history of hernia surgery


Learning disability





Plan


started Teflaro pending blood cx, wound cx; follow up Podiatry evaluation and 

recommendations


will monitor clinical response, trend WBC count and fever curve

## 2018-05-08 NOTE — RAD
HISTORY:

sob  



COMPARISON:

04/11/2018 



FINDINGS:



LUNGS:

No active pulmonary disease.



PLEURA:

No significant pleural effusion identified, no pneumothorax apparent.



CARDIOVASCULAR:

Moderate cardiomegaly



OSSEOUS STRUCTURES:

No significant abnormalities.



VISUALIZED UPPER ABDOMEN:

Normal.



OTHER FINDINGS:

None.



IMPRESSION:

No active disease.

## 2018-05-08 NOTE — CARD
--------------- APPROVED REPORT --------------





EKG Measurement

Heart [id number]

OH 116P48

[id number]

[id number]

[id number]



<Conclusion>

SVT at 150 BPM, possible sinus tacycardia

STTW changes c/w ischemia.

Prolonged QTc.

## 2018-05-08 NOTE — US
HISTORY:

Leg pain and swelling. Evaluate for DVT



PHYSICIAN(S):  Con Armstrong MD.



TECHNIQUE:

Duplex sonography and color-flow Doppler with graded compression were 

used to evaluate the deep venous systems of both lower extremities. 

The exam is very limited by body habitus edema and bandages below the 

knees.  The tibial veins are not evaluated



FINDINGS:

The visualized deep venous systems of both lower extremities are 

sonographically normal and compressible. Normal wave forms and 

augmentation are seen. There is no sonographic evidence for deep 

venous thrombosis in the visualized segments of both lower 

extremities.



IMPRESSION:

No sonographic evidence for deep venous thrombosis in the visualized 

segments of both lower extremities. 



Very limited study

## 2018-05-08 NOTE — CP.PCM.CON
History of Present Illness





- History of Present Illness


History of Present Illness: 





Surgical consult note for Dr. Marshall





59 yo M with PMH of CAD, HTN, CHF, IDDM, epilepsy, chronic lower extremity 

edema and elephantiasis presented to the ER complaining of fever, right leg pain

, and foul odor from the legs. He presents with his mother at bedside, who is 

providing some of the history. He denies any chest pain, shortness of breath, 

abdominal pain, nausea, vomiting. Mother reports that after he came to the ER, 

he had one episode of diarrhea. He has a history of chronic leg ulcers with 

recurrent MRSA infections. He is followed by Dr. Ramirez for his chronic lower 

extremity edema and leg ulcers.





PMH: See above


PMD: Dr. Peraza


PSHx: Inguinal hernia repair 





Past Patient History





- Infectious Disease


Hx of Infectious Diseases: None





- Tetanus Immunizations


Tetanus Immunization: Unknown





- Past Medical History & Family History


Past Medical History?: Yes





- Past Social History


Smoking Status: Former Smoker





- CARDIAC


Hx Cardiac Disorders: Yes


Hx Atrial Fibrillation: Yes


Hx Congestive Heart Failure: Yes


Hx Hypertension: Yes





- PULMONARY


Hx Respiratory Disorders: No





- NEUROLOGICAL


Hx Neurological Disorder: Yes


Hx Seizures: Yes


Other/Comment: Epilepsy





- HEENT


Hx HEENT Problems: Yes


Other/Comment: wears glasses





- RENAL


Hx Chronic Kidney Disease: No





- ENDOCRINE/METABOLIC


Hx Endocrine Disorders: Yes


Hx Diabetes Mellitus Type 2: Yes





- HEMATOLOGICAL/ONCOLOGICAL


Hx Blood Disorders: No


Other/Comment: blood infection





- INTEGUMENTARY


Hx Dermatological Problems: Yes


Other/Comment: multiple wounds on the rt leg, cellulitis in b/l extreminity. 

Left thigh cellulitis





- MUSCULOSKELETAL/RHEUMATOLOGICAL


Hx Musculoskeletal Disorders: Yes


Hx Falls: Yes





- GASTROINTESTINAL


Hx Gastrointestinal Disorders: Yes





- GENITOURINARY/GYNECOLOGICAL


Hx Genitourinary Disorders: No





- PSYCHIATRIC


Hx Psychophysiologic Disorder: Yes


Hx Substance Use: No


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





- SURGICAL HISTORY


Hx Amputation: No


Hx Appendectomy: No


Hx Cholecystectomy: No


Hx Gastric Bypass Surgery: No


Hx Hysterectomy: No


Hx Joint Replacement: No


Hx Kidney Transplant: No


Hx Liver Transplant: No


Hx Mastectomy: No


Hx Musculoskeletal Surgery: No


Hx Open Heart Surgery: No


Hx Orthopedic Surgery: No


Hx Splenectomy: No


Hx Valve Replacement: No





- ANESTHESIA


Hx Anesthesia: Yes


Hx Anesthesia Reactions: No


Hx Malignant Hyperthermia: No





Meds


Allergies/Adverse Reactions: 


 Allergies











Allergy/AdvReac Type Severity Reaction Status Date / Time


 


No Known Allergies Allergy   Verified 05/08/18 02:12














- Medications


Medications: 


 Current Medications





Acetaminophen (Tylenol 325mg Tab)  650 mg PO Q4H PRN


   PRN Reason: Fever >100.4 F


   Stop: 05/08/18 12:00


Apixaban (Eliquis)  2.5 mg PO BID CYNDEE


   PRN Reason: Protocol


Atorvastatin Calcium (Lipitor)  10 mg PO HS CYNDEE


Diltiazem HCl (Cardizem Cd)  120 mg PO DAILY CYNDEE


Furosemide (Lasix)  20 mg PO DAILY CYNDEE


Gabapentin (Neurontin)  400 mg PO TID CYNDEE


   PRN Reason: Protocol


Glipizide (Glucotrol)  10 mg PO BID CYNDEE


Ceftaroline Fosamil 400 mg/ (Sodium Chloride)  100 mls @ 100 mls/hr IVPB Q12 CYNDEE


   PRN Reason: Protocol


   Stop: 05/15/18 07:26


   Last Admin: 05/08/18 08:28 Dose:  100 mls/hr


Insulin Human Regular (Humulin R Med)  0 units SC ACHS CYNDEE


   PRN Reason: Protocol


   Last Admin: 05/08/18 07:58 Dose:  10 units


Pioglitazone HCl (Actos)  45 mg PO DAILY CYNDEE


Ramipril (Altace)  10 mg PO DAILY CYNDEE











Physical Exam





- Constitutional


Appears: Toxic, No Acute Distress, Older Than Stated Age, Chronically Ill





- Head Exam


Head Exam: ATRAUMATIC, NORMOCEPHALIC





- Eye Exam


Eye Exam: EOMI, Normal appearance





- ENT Exam


ENT Exam: Mucous Membranes Moist





- Neck Exam


Neck exam: Positive for: Normal Inspection





- Respiratory Exam


Respiratory Exam: NORMAL BREATHING PATTERN





- Cardiovascular Exam


Cardiovascular Exam: Tachycardia, REGULAR RHYTHM





- GI/Abdominal Exam


GI & Abdominal Exam: Soft.  absent: Distended, Guarding, Rigid, Tenderness





- Extremities Exam


Additional comments: 





Bilateral lower extremities dressed with compression wraps to ankles to knees. 

Unwrapped; chronic venous stasis changes. Small (3-4mm) excoriation, without 

bleeding or drainage on anterior aspect of right shin. No areas of erythema, 

fluctuance, drainage, or bleeding. Bilateral feet/toes with hyperkeratosis and 

acanthosis. Bilateral thigh swelling and lymphedema, chronic appearing, with 

mild erythema and warmth over right medial thigh. No crepitus, induration, or 

fluctuance over thighs or scrotum.





- Neurological Exam


Additional comments: 





Sleepy, easily arousable, responding to questions appropriately





- Skin


Additional comments: 


Diaphoretic


Normal, except for leg exam.





Results





- Vital Signs


Recent Vital Signs: 


 Last Vital Signs











Temp  98.9 F   05/08/18 07:05


 


Pulse  107 H  05/08/18 07:05


 


Resp  20   05/08/18 07:05


 


BP  107/39 L  05/08/18 07:05


 


Pulse Ox  95   05/08/18 07:05














- Labs


Result Diagrams: 


 05/08/18 02:35





 05/08/18 02:35


Labs: 


 Laboratory Results - last 24 hr











  05/08/18 05/08/18





  07:53 08:13


 


pO2   186 H


 


VBG pH   7.32


 


VBG pCO2   35.0 L


 


VBG HCO3   18.0 L


 


VBG Total CO2   19.1 L


 


VBG O2 Sat (Calc)   98.2 H


 


VBG Base Excess   -7.3 L


 


VBG Potassium   5.4 H


 


Sodium   129.0 L


 


Chloride   97.0 L


 


Glucose   590 H* D


 


Lactate   2.8 H


 


FiO2   21.0


 


POC Glucose (mg/dL)  466 H* 


 


Venous Blood Potassium   5.4 H














Assessment & Plan





- Assessment and Plan (Free Text)


Assessment: 





59 yo M with chronic lower extremity lymphedema and history of chronic 

ulcerations but no active ulcerations at this time, presenting with fever and 

leg pain; SIRS/Sepsis with unknown source


Plan: 


- No signs of abscess or focal infection in the legs, does not appear to be 

source of sepsis; no indication for surgical intervention at this time


- Follow up septic workup; ordered UA and UCx


- Recommend continuous leg elevation for chronic lower extremity edema


- Will continue to follow


- Discussed with Dr. Jennifer Dalton Amine PGY1

## 2018-05-08 NOTE — ED PDOC
Arrival/HPI





- General


Chief Complaint: Lower Extremity Problem/Injury


Time Seen by Provider: 05/08/18 01:57


Historian: Patient, Spouse





- History of Present Illness


Narrative History of Present Illness (Text): 





05/08/18 02:15


Patient is a 58 year old male who presents to the Emergency department 

complaining of right leg pain with associated leg infection.  Patient is 

present with wife who speaks for him.  She reports that patients right lower 

extremity has erythema and feels hot.  Patient is awake and alert.





Symptom Onset: Gradual


Context: Home





Past Medical History





- Provider Review


Nursing Documentation Reviewed: Yes





- Infectious Disease


Hx of Infectious Diseases: None





- Tetanus Immunization


Tetanus Immunization: Unknown





- Cardiac


Hx Cardiac Disorders: Yes


Hx Atrial Fibrillation: Yes


Hx Congestive Heart Failure: Yes


Hx Hypertension: Yes





- Pulmonary


Hx Respiratory Disorders: No





- Neurological


Hx Neurological Disorder: Yes


Hx Seizures: Yes


Other/Comment: Epilepsy





- HEENT


Hx HEENT Disorder: Yes


Other/Comment: wears glasses





- Renal


Hx Renal Disorder: No





- Endocrine/Metabolic


Hx Endocrine Disorders: Yes


Hx Diabetes Mellitus Type 2: Yes





- Hematological/Oncological


Hx Blood Disorders: No


Other/Comment: blood infection





- Integumentary


Hx Dermatological Disorder: Yes


Other/Comment: multiple wounds on the rt leg, cellulitis in b/l extreminity. 

Left thigh cellulitis





- Musculoskeletal/Rheumatological


Hx Musculoskeletal Disorders: Yes


Hx Falls: Yes





- Gastrointestinal


Hx Gastrointestinal Disorders: Yes





- Genitourinary/Gynecological


Hx Genitourinary Disorders: No





- Psychiatric


Hx Psychophysiologic Disorder: Yes


Hx Substance Use: No


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





- Surgical History


Hx Amputation: No


Hx Appendectomy: No


Hx Cholecystectomy: No


Hx Gastric Bypass Surgery: No


Hx Hysterectomy: No


Hx Joint Replacement: No


Hx Kidney Transplant: No


Hx Liver Transplant: No


Hx Mastectomy: No


Hx Musculoskeletal Surgery: No


Hx Open Heart Surgery: No


Hx Orthopedic Surgery: No


Hx Splenectomy: No


Hx Valve Replacement: No





- Anesthesia


Hx Anesthesia: Yes


Hx Anesthesia Reactions: No


Hx Malignant Hyperthermia: No





- Suicidal Assessment


Feels Threatened In Home Enviroment: No





Family/Social History





- Physician Review


Nursing Documentation Reviewed: Yes


Family/Social History: No Known Family HX


Smoking Status: Former Smoker


Hx Alcohol Use: No


Hx Substance Use: No


Hx Substance Use Treatment: No





Allergies/Home Meds


Allergies/Adverse Reactions: 


Allergies





No Known Allergies Allergy (Verified 05/08/18 11:42)


 








Home Medications: 


 Home Meds











 Medication  Instructions  Recorded  Confirmed


 


Apixaban [Eliquis] 2.5 mg PO BID 08/18/17 05/08/18


 


Atorvastatin [Lipitor] 10 mg PO DAILY 08/18/17 05/08/18


 


Diltiazem HCl [Diltiazem ER] 120 mg PO DAILY 08/18/17 05/08/18


 


Ferrous Fum/Vit C/B12/Stomc [Ziks 1 cap PO DAILY 08/18/17 05/08/18





Hematogen]   


 


Furosemide [Lasix] 20 mg PO DAILY 08/18/17 05/08/18


 


Gabapentin [Neurontin] 400 mg PO TID 08/18/17 05/08/18


 


GlipiZIDE [Glipizide] 10 mg PO BID 08/18/17 05/08/18


 


Insulin Lispro [Humalog Kwikpen 1 unit SQ ACHS 08/18/17 05/08/18





U-100]   


 


Metformin HCl [Glucophage] 1,000 mg PO BID 08/18/17 05/08/18


 


Omeprazole 20 mg PO DAILY 08/18/17 05/08/18


 


Pioglitazone HCl 45 mg PO DAILY 08/18/17 05/08/18


 


Potassium Chloride [Klor-Con M20] 20 meq PO DAILY 08/18/17 05/08/18


 


Ramipril [Altace] 10 mg PO DAILY 08/18/17 05/08/18














Review of Systems





- Physician Review


All systems were reviewed & negative as marked: Yes





- Review of Systems


Respiratory: absent: SOB


Cardiovascular: absent: Chest Pain


Musculoskeletal: Other (Right leg pain and erythema.)





Physical Exam


Vital Signs











  Temp Pulse Resp BP Pulse Ox


 


 05/08/18 11:15   96 H   133/66 


 


 05/08/18 11:13     133/66 


 


 05/08/18 10:50  98.9 F  98 H  20  133/66  94 L


 


 05/08/18 07:05  98.9 F  107 H  20  107/39 L  95


 


 05/08/18 06:53  98.9 F  110 H  18  103/50 L  95


 


 05/08/18 06:14   116 H  18  109/47 L  92 L


 


 05/08/18 03:31  102.1 F H    


 


 05/08/18 02:04  102.2 F H  107 H  19  134/53 L  95











Temperature: Febrile


Blood Pressure: Normal


Pulse: Tachycardic


Mental Status: Positive for: Alert and Oriented X 3





- Systems Exam


Head: Present: Atraumatic, Normocephalic


Pupils: Present: PERRL


Extroacular Muscles: Present: EOMI


Conjunctiva: Present: Normal


Mouth: Present: Moist Mucous Membranes


Neck: Present: Normal Range of Motion


Respiratory/Chest: Present: Clear to Auscultation, Good Air Exchange.  No: 

Respiratory Distress, Accessory Muscle Use


Cardiovascular: Present: Regular Rate and Rhythm, Normal S1, S2.  No: Murmurs


Abdomen: No: Tenderness, Distention, Peritoneal Signs


Back: Present: Normal Inspection


Upper Extremity: Present: Normal Inspection.  No: Cyanosis, Edema


Lower Extremity: Present: Normal Inspection, Erythema (erythema of right upper 

thigh.).  No: Edema


Neurological: Present: GCS=15, CN II-XII Intact, Speech Normal


Skin: Present: Warm, Dry, Normal Color.  No: Rashes


Psychiatric: Present: Alert, Oriented x 3, Normal Insight, Normal Concentration





Medical Decision Making


ED Course and Treatment: 





05/08/18 02:15


Impression:


Patient is a 58 year old male with right leg pain and possible infection.





Differential Diagnosis included but are not limited to:  Cellulitis vs. skin 

abscess vs. DVT








Plan:


--lab works


--blood culture


--Wound culture


--Lower extremity US


--Chest X-ray


--Tylenol


--Vancomycin


--Zosyn


-- Reassess and disposition





Prior Visits:


Notes and results from previous visits were reviewed.





Progress Notes:





05/08/18 03:42


Notified by nursing staff that patient became diaphoretic and short of breath 

during administration of IV antibiotic. Patient given 100% O2 by non-rebreather 

face mask and nebulizer treatment.





05/08/18 03:42


Lower extremity US report reviewed and was negative.





05/08/18 05:08


Chest X-ray shows no acute changes. Interpreted by me.





05/08/18 05:47


On reevaluation patient status has improved s/p bowel movement.





05/08/18 5:50


Case discussed with  who accepts patient to his service, and requests 

, , and  on consult.


05/08/18 05:57








- Lab Interpretations


Lab Results: 








 05/08/18 02:35 





 05/08/18 02:35 





 Lab Results





05/08/18 04:25: pO2 74 H, VBG pH 7.40, VBG pCO2 42.0, VBG HCO3 26.0, VBG Total 

CO2 27.3, VBG O2 Sat (Calc) 96.2 H, VBG Base Excess 1.0, VBG Potassium 5.0, 

Glucose 363 H, Lactate 2.0, FiO2 21.0, Sodium 131.0 L, Chloride 99.0, Venous 

Blood Potassium 5.0


05/08/18 02:35: Lactate Dehydrogenase 483, Total Creatine Kinase 39, Troponin I 

< 0.01, NT-Pro-B Natriuret Pep 105


05/08/18 02:35: WBC 24.1 H D, RBC 4.59, Hgb 13.7 L, Hct 40.5 L, MCV 88.2, MCH 

29.8, MCHC 33.8, RDW 12.7, Plt Count 271, MPV 10.4


05/08/18 02:35: Sodium 136, Potassium 4.5, Chloride 99, Carbon Dioxide 24, 

Anion Gap 18, BUN 17, Creatinine 0.8, Est GFR (African Amer) > 60, Est GFR (Non-

Af Amer) > 60, Random Glucose 334 H* D, Calcium 9.6, Total Bilirubin 0.7, AST 30

, ALT 34, Alkaline Phosphatase 107, Total Protein 7.3, Albumin 4.1, Globulin 3.2

, Albumin/Globulin Ratio 1.3








I have reviewed the lab results: Yes





- RAD Interpretation


Radiology Orders: 








05/08/18 02:15


DUPLEX LOWER EXTRM VEIN BILAT [US] Stat 





05/08/18 04:05


CHEST PORTABLE [RAD] Stat 














- Medication Orders


Current Medication Orders: 








Apixaban (Eliquis)  2.5 mg PO BID [facility]


   PRN Reason: Protocol


   Last Admin: 05/08/18 17:14  Dose: 2.5 mg





Atorvastatin Calcium (Lipitor)  10 mg PO HS [facility]


   Last Admin: 05/08/18 22:11  Dose: 10 mg





Diltiazem HCl (Cardizem Cd)  120 mg PO DAILY [facility]


   Last Admin: 05/08/18 11:15  Dose: 120 mg





MAR Pulse and Blood Pressure


 Document     05/08/18 11:15  SRE  (Rec: 05/08/18 11:15  SRE  [id number])


     Pulse


      Pulse Rate (60-90)                         96


     Blood Pressure


      Blood Pressure (100//90)             133/66





Furosemide (Lasix)  20 mg PO DAILY [facility]


   Last Admin: 05/08/18 11:13  Dose: 20 mg





MAR Blood Pressure


 Document     05/08/18 11:13  SRE  (Rec: 05/08/18 11:13  SRE  [id number])


     Blood Pressure


      Blood Pressure (100//90)             133/66





Gabapentin (Neurontin)  400 mg PO TID CYNDEE


   PRN Reason: Protocol


   Last Admin: 05/08/18 17:15  Dose: 400 mg





Behavioural


 Document     05/08/18 17:15  EP  (Rec: 05/08/18 17:15  EP  [id number][place]


     Maintenance


      Maintenance Dose                           Yes


     Nonmedicinal


      Nonmedicinal Interventions                 Redirect


     Behavior


      Behavior for Medication:                   Anxiety


Re-Assess: Reassess Psych Meds


 Document     05/08/18 18:15  EP  (Rec: 05/08/18 18:30  EP  [id number])


      Reassess Psych Med                         Effective





Glipizide (Glucotrol)  10 mg PO BID [facility]


   Last Admin: 05/08/18 17:14  Dose: 10 mg





Ceftaroline Fosamil 400 mg/ (Sodium Chloride)  100 mls @ 100 mls/hr IVPB Q12 CYNDEE


   PRN Reason: Protocol


   Stop: 05/15/18 07:26


   Last Admin: 05/08/18 21:44  Dose: 100 mls/hr





eMAR Start Stop


 Document     05/08/18 21:44  JK  (Rec: 05/08/18 21:45  [id number])


     Intravenous Solution


      Start Date                                 05/08/18


      Start Time                                 21:44


      End Date                                   05/08/18


      End time                                   22:45


      Total Infusion Time                        61





Sodium Chloride (Sodium Chloride 0.9%)  1,000 mls @ 100 mls/hr IV .Q10H [facility]


   Last Admin: 05/08/18 21:45  Dose: 100 mls/hr





eMAR Start Stop


 Document     05/08/18 21:45  MICHAEL  (Rec: 05/08/18 21:47  [facility]-[id number])


     Intravenous Solution


      Start Date                                 05/08/18


      Start Time                                 21:45


      End Date                                   05/08/18


      End time                                   08:45


      Total Infusion Time                        -780





Insulin Human Lispro (Humalog)  10 units SC AC CYNDEE


Insulin Human Regular (Humulin R High)  0 units SC ACHS CYNDEE


   PRN Reason: Protocol


   Last Admin: 05/08/18 22:19  Dose: 5 units





MAR Blood Glucose


 Document     05/08/18 22:19  MICHAEL  (Rec: 05/08/18 22:25  MICHAEL  [id number]


     Blood Glucose


      Finger Stick Blood Glucose ()        276


Subcutaneous Administrations


 Document     05/08/18 22:19  MICHAEL  (Rec: 05/08/18 22:25  MICHAEL  [id number]


     Injection Site


      MAR Injection Site                         Right Arm


     Charges for Administration


      # of Subcutaneous Administrations          1





Pioglitazone HCl (Actos)  30 mg PO DAILY CYNDEE


Pioglitazone HCl (Actos)  15 mg PO DAILY CYNDEE


Ramipril (Altace)  10 mg PO DAILY CYNDEE


   Last Admin: 05/08/18 11:16  Dose: 10 mg





Discontinued Medications





Acetaminophen (Tylenol 325mg Tab)  975 mg PO STAT STA


   Stop: 05/08/18 02:16


   Last Admin: 05/08/18 03:31  Dose: 975 mg





MAR Pain/Vitals


 Document     05/08/18 03:31  IT  (Rec: 05/08/18 03:31  IT  [id number])


     Vitals


      Temperature (97.6 F-99.6 F)                102.1 F


      Temperature Source                         Oral





Acetaminophen (Tylenol 325mg Tab)  650 mg PO Q4H PRN


   PRN Reason: Fever >100.4 F


   Stop: 05/08/18 12:00


Vancomycin HCl (Vancomycin 1gm)  1 gm in 250 mls @ 167 mls/hr IVPB STAT STA


   PRN Reason: Protocol


   Stop: 05/08/18 03:50


   Last Admin: 05/08/18 06:39  Dose: 167 mls/hr





eMAR Start Stop


 Document     05/08/18 06:39  IT  (Rec: 05/08/18 06:39  IT  [id number])


     Intravenous Solution


      Start Date                                 05/08/18


      Start Time                                 06:39





Piperacillin Sod/Tazobactam Sod (Zosyn 3.375 In Ns 100ml)  100 mls @ 200 mls/hr 

IV STAT STA


   PRN Reason: Protocol


   Stop: 05/08/18 02:47


   Last Admin: 05/08/18 03:31  Dose: 200 mls/hr





eMAR Start Stop


 Document     05/08/18 03:31  IT  (Rec: 05/08/18 03:31  IT  [id number])


     Intravenous Solution


      Start Date                                 05/08/18


      Start Time                                 03:31


      End Date                                   05/08/18





Sodium Chloride (Sodium Chloride 0.9%)  1,000 mls @ 999 mls/hr IV .Q1H1M STA


   Stop: 05/08/18 13:34


   Last Admin: 05/08/18 12:45  Dose: 999 mls/hr





eMAR Start Stop


 Document     05/08/18 12:45  EP  (Rec: 05/08/18 12:45  EP  [id number])


     Intravenous Solution


      Start Date                                 05/08/18


      Start Time                                 12:45





Insulin Human Regular (Humulin R Med)  0 units SC ACHS CYNDEE


   PRN Reason: Protocol


   Last Admin: 05/08/18 11:16  Dose: 10 units





MAR Blood Glucose


 Document     05/08/18 11:16  SRE  (Rec: 05/08/18 11:17  SRE  [id number])


     Blood Glucose


      Finger Stick Blood Glucose ()        500


Subcutaneous Administrations


 Document     05/08/18 11:16  SRE  (Rec: 05/08/18 11:17  SRE  [id number])


     Injection Site


      MAR Injection Site                         Left Arm


     Charges for Administration


      # of Subcutaneous Administrations          1





Insulin Human Regular (Humulin R)  15 units SC STAT STA


   Stop: 05/08/18 12:35


   Last Admin: 05/08/18 13:52  Dose: 15 units


   Comments: per MD, give after BS recheck.





MAR Blood Glucose


 Document     05/08/18 13:52  EP  (Rec: 05/08/18 13:53  EP  [id number])


     Blood Glucose


      Finger Stick Blood Glucose ()        500


Subcutaneous Administrations


 Document     05/08/18 13:52  EP  (Rec: 05/08/18 13:53  EP  [id number])


     Charges for Administration


      # of Subcutaneous Administrations          1





Pioglitazone HCl (Actos)  45 mg PO DAILY CYNDEE


Pneumococcal Polyvalent Vaccine (Pneumovax 23 Vaccine)  0.5 ml IM .ONCE ONE


   Stop: 05/08/18 16:25











- Scribe Statement


The provider has reviewed the documentation as recorded by the Scribdaniella Watts





Provider Scribe Attestation:


All medical record entries made by the Scribe were at my direction and 

personally dictated by me. I have reviewed the chart and agree that the record 

accurately reflects my personal performance of the history, physical exam, 

medical decision making, and the department course for this patient. I have 

also personally directed, reviewed, and agree with the discharge instructions 

and disposition








Disposition/Present on Arrival





- Present on Arrival


Any Indicators Present on Arrival: No


History of DVT/PE: No


History of Uncontrolled Diabetes: Yes


Urinary Catheter: No


History of Decub. Ulcer: Yes (Right Leg)


History Surgical Site Infection Following: None





- Disposition


Have Diagnosis and Disposition been Completed?: Yes


Diagnosis: 


 Cellulitis





Disposition: HOME/ ROUTINE


Disposition Time: 05:55


Condition: STABLE

## 2018-05-08 NOTE — HP
HISTORY OF PRESENT ILLNESS:  This is a 58-year-old male, who is coming to

the hospital complaining of leg pain.  The patient's mom is at the bedside.

She says that he is not being feeling well, he had decrease in his

appetite, was not watching TV, which is unlike him.  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

issues since birth.  The patient does not answer much questions, although

he does not talk much from my previous experience with him.  The patient is

alert and awake.  He does have a history of chronic lower extremity

cellulitis with chronic lymphedema.  He has been getting this wrapped.  He

follows with Dr. Ramirez and Dr. Marshall.  He has had chronic wounds in his

life in the past and so this is what brought him to the hospital.  She was

worried that he might have another infection.  The patient was seen by

Surgery.  I did speak to them.



REVIEW OF SYMPTOMS:  Limited, because of the patient's underlying cognitive

issues, not able to give a full history and full review of symptoms.



PAST MEDICAL HISTORY:  Morbid obesity, chronic cellulitis of the legs,

atrial fibrillation on anticoagulation, hypertension, dyslipidemia,

nephrolithiasis.



SOCIAL HISTORY:  The patient does not smoke, drink or use drugs.  He lives

with his mother.  He is unemployed.



FAMILY HISTORY:  Noncontributory.



PHYSICAL EXAMINATION:

VITAL SIGNS:  T-max is 102.2, pulse of 107, blood pressure 134/53,

respirations 19, O2 saturation 95%, height is 5 feet 6, weight is 249

pounds, BMI is 40.2.

GENERAL:  The patient lying in bed, uncomfortable, and in no acute

distress.

HEENT:  Atraumatic and normocephalic.  Anicteric sclerae.  Moist mucosa.

Pink conjunctivae.  No oral lesions.

NECK:  No JVD, anterior and posterior adenopathy, thyromegaly, or bruits.

CARDIOVASCULAR:  S1 and S2 regular.  No murmur, rubs, or gallop.

LUNGS:  Clear to auscultation bilaterally.  No wheezes, rales, or rhonchi.

ABDOMEN:  Bowel sounds are positive.  Soft, nontender and nondistended.  No

hepatosplenomegaly. No rebound and no guarding

EXTREMITIES:  There are chronic skin changes of the legs.  Chronic

lymphedema with chronic changes in the feet as well as the legs after the

thighs bilaterally, 1 to 2 cm small ulcer in the right leg.

NEUROLOGIC:  The patient is not able to speak, but able to follow commands

and direction during this exam.  He is essentially normal from compared to

his previous exam.

PSYCHIATRIC: He is awake, alert and oriented x3.  No anxiety or depression.

He has normal affect.

GENITOURINARY:  No CVA tenderness.

VASCULAR:  2+ pulses in the carotid pulses and pedal pulses.

SKIN:  No erythema or nodules

SPINE:  Shows normal curvature.



LABORATORY DATA:  Have been reviewed.  White count 24.1.  Chemistry shows

sodium 136, potassium 4.5, troponin 0.01, sugar is 334.  ABG done shows a

pH of 7.32, PaO2 of 186.

His chest x-ray shows no infiltrates.



ASSESSMENT:

1.  Sepsis.

2.  Nephrolithiasis.

3.  Obesity with the body mass index of 40.

4.  Atrial fibrillation on Eliquis.

5.  Hypertension.

6.  Dyslipidemia.

7.  Diabetes type 2.



PLAN:  The patient is currently admitted to the hospital.  He is going to

be continuing.  He has elevated white count.  He has chronic skin changes

with possible underlying ulcer in his right leg.  The patient is going to

be on his Cardizem for his atrial fibrillation.  He is going to be on

Eliquis.  I will get Dr. Villegas for consultation.  The patient was

given Zosyn, but he became tachycardic.  He has had penicillin in the past

and has not had any reactions.  Not sure if this from his underlying

infections.  The patient is on Glucotrol for his diabetes, I will place him

on insulin sliding scale.  He is going to be on Lipitor for his

dyslipidemia.  He is on Neurontin for his peripheral neuropathy.  The

patient is going to need evaluation by Dr. Marshall and Dr. Ramirez.  Wound

cultures and blood cultures have been ordered.  I will place him on

carbohydrate consistent diet.  We will continue to follow closely but admit

him to the medical floor.





__________________________________________

Delbert Corona MD







DD:  05/08/2018 9:10:48

DT:  05/08/2018 13:28:34

Job # [id number]

## 2018-05-08 NOTE — CP.PCM.CON
<Annelise Spencer - Last Filed: 05/08/18 15:54>





History of Present Illness





- History of Present Illness


History of Present Illness: 





57 y/o male seen at bedside after consultation for bilateral lower extremity 

edema with cellulitic skin changes. Pt is known to the [facility] by 

Dr. Ramirez and Dr. Hobbs. At present, pt's legs wrapped in bilateral leg 

dressings with compression. Pt is nonverbal. Pt denies pain to the lower 

extremities. Denies F/C/N/V/CP/SOB





Review of Systems





- Review of Systems


All systems: reviewed and no additional remarkable complaints except (per HPI)





Past Patient History





- Infectious Disease


Hx of Infectious Diseases: None





- Tetanus Immunizations


Tetanus Immunization: Unknown





- Past Medical History & Family History


Past Medical History?: Yes





- Past Social History


Smoking Status: Former Smoker





- CARDIAC


Hx Cardiac Disorders: Yes


Hx Atrial Fibrillation: Yes


Hx Congestive Heart Failure: Yes


Hx Hypertension: Yes





- PULMONARY


Hx Respiratory Disorders: No





- NEUROLOGICAL


Hx Neurological Disorder: Yes


Hx Seizures: Yes


Other/Comment: Epilepsy





- HEENT


Hx HEENT Problems: Yes


Other/Comment: wears glasses





- RENAL


Hx Chronic Kidney Disease: No





- ENDOCRINE/METABOLIC


Hx Endocrine Disorders: Yes


Hx Diabetes Mellitus Type 2: Yes





- HEMATOLOGICAL/ONCOLOGICAL


Hx Blood Disorders: No


Other/Comment: blood infection





- INTEGUMENTARY


Hx Dermatological Problems: Yes


Other/Comment: multiple wounds on the rt leg, cellulitis in b/l extreminity. 

Left thigh cellulitis





- MUSCULOSKELETAL/RHEUMATOLOGICAL


Hx Musculoskeletal Disorders: Yes


Hx Falls: Yes





- GASTROINTESTINAL


Hx Gastrointestinal Disorders: Yes





- GENITOURINARY/GYNECOLOGICAL


Hx Genitourinary Disorders: No





- PSYCHIATRIC


Hx Psychophysiologic Disorder: Yes


Hx Substance Use: No


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





- SURGICAL HISTORY


Hx Amputation: No


Hx Appendectomy: No


Hx Cholecystectomy: No


Hx Gastric Bypass Surgery: No


Hx Hysterectomy: No


Hx Joint Replacement: No


Hx Kidney Transplant: No


Hx Liver Transplant: No


Hx Mastectomy: No


Hx Musculoskeletal Surgery: No


Hx Open Heart Surgery: No


Hx Orthopedic Surgery: No


Hx Splenectomy: No


Hx Valve Replacement: No





- ANESTHESIA


Hx Anesthesia: Yes


Hx Anesthesia Reactions: No


Hx Malignant Hyperthermia: No





Meds


Allergies/Adverse Reactions: 


 Allergies











Allergy/AdvReac Type Severity Reaction Status Date / Time


 


No Known Allergies Allergy   Verified 05/08/18 11:42














- Medications


Medications: 


 Current Medications





Apixaban (Eliquis)  2.5 mg PO BID [facility]


   PRN Reason: Protocol


   Last Admin: 05/08/18 11:15 Dose:  2.5 mg


Atorvastatin Calcium (Lipitor)  10 mg PO HS [facility]


Diltiazem HCl (Cardizem Cd)  120 mg PO DAILY [facility]


   Last Admin: 05/08/18 11:15 Dose:  120 mg


Furosemide (Lasix)  20 mg PO DAILY [facility]


   Last Admin: 05/08/18 11:13 Dose:  20 mg


Gabapentin (Neurontin)  400 mg PO TID [facility]


   PRN Reason: Protocol


   Last Admin: 05/08/18 13:53 Dose:  400 mg


Glipizide (Glucotrol)  10 mg PO BID [facility]


   Last Admin: 05/08/18 11:15 Dose:  10 mg


Ceftaroline Fosamil 400 mg/ (Sodium Chloride)  100 mls @ 100 mls/hr IVPB Q12 [facility]


   PRN Reason: Protocol


   Stop: 05/15/18 07:26


   Last Admin: 05/08/18 08:28 Dose:  100 mls/hr


Sodium Chloride (Sodium Chloride 0.9%)  1,000 mls @ 100 mls/hr IV .Q10H [facility]


   Last Admin: 05/08/18 09:37 Dose:  100 mls/hr


Insulin Human Regular (Humulin R High)  0 units SC ACHS [facility]


   PRN Reason: Protocol


Pioglitazone HCl (Actos)  30 mg PO DAILY [facility]


Pioglitazone HCl (Actos)  15 mg PO DAILY [facility]


Ramipril (Altace)  10 mg PO DAILY [facility]


   Last Admin: 05/08/18 11:16 Dose:  10 mg











Physical Exam





- Constitutional


Appears: Well, Non-toxic, No Acute Distress





- Extremities Exam


Additional comments: 





Lower extremity focused exam:


Vasc: DP/PT pulses non palpable secondary to edema. Temperature gradient warm 

to cool. CFT < 3 sec to all digits. Diffuse non pitting pedal edema noted B/L 

from tibial tuberosity to digits


Derm: Diffuse elephantiasis skin changes noted to dorsum of foot B/L. Mild 

preulcerative lesion noted to anterior mid right leg with minimal dry 

sanguinous drainage - no purulence, no malodor, no fluctuance. No other open 

lesions or clinical suspicion for infection


Neuro: Protective sensation grossly intact


Ortho: No tenderness to posterior calf B/L. No tenderness to palpation of B/L 

lower extremities





- Neurological Exam


Neurological exam: Alert, Oriented x3





- Psychiatric Exam


Psychiatric exam: Normal Affect, Normal Mood





Results





- Vital Signs


Recent Vital Signs: 


 Last Vital Signs











Temp  98.9 F   05/08/18 10:50


 


Pulse  96 H  05/08/18 11:15


 


Resp  20   05/08/18 10:50


 


BP  133/66   05/08/18 11:15


 


Pulse Ox  94 L  05/08/18 10:50














- Labs


Result Diagrams: 


 05/08/18 02:35





 05/08/18 11:15


Labs: 


 Laboratory Results - last 24 hr











  05/08/18 05/08/18 05/08/18





  07:53 08:13 10:52


 


pO2   186 H 


 


VBG pH   7.32 


 


VBG pCO2   35.0 L 


 


VBG HCO3   18.0 L 


 


VBG Total CO2   19.1 L 


 


VBG O2 Sat (Calc)   98.2 H 


 


VBG Base Excess   -7.3 L 


 


VBG Potassium   5.4 H 


 


Sodium   129.0 L 


 


Chloride   97.0 L 


 


Glucose   590 H* D 


 


Lactate   2.8 H 


 


FiO2   21.0 


 


Potassium   


 


Carbon Dioxide   


 


Anion Gap   


 


BUN   


 


Creatinine   


 


Est GFR ( Amer)   


 


Est GFR (Non-Af Amer)   


 


POC Glucose (mg/dL)  466 H*   > 500 H*


 


Random Glucose   


 


Calcium   


 


Venous Blood Potassium   5.4 H 


 


Urine Color   


 


Urine Appearance   


 


Urine pH   


 


Ur Specific Gravity   


 


Urine Protein   


 


Urine Glucose (UA)   


 


Urine Ketones   


 


Urine Blood   


 


Urine Nitrate   


 


Urine Bilirubin   


 


Urine Urobilinogen   


 


Ur Leukocyte Esterase   


 


Urine RBC   


 


Urine WBC   


 


Ur Epithelial Cells   


 


Urine Bacteria   














  05/08/18 05/08/18 05/08/18





  11:15 12:16 13:26


 


pO2   


 


VBG pH   


 


VBG pCO2   


 


VBG HCO3   


 


VBG Total CO2   


 


VBG O2 Sat (Calc)   


 


VBG Base Excess   


 


VBG Potassium   


 


Sodium  136  


 


Chloride  103  


 


Glucose   


 


Lactate   


 


FiO2   


 


Potassium  4.5  


 


Carbon Dioxide  18 L  


 


Anion Gap  19  


 


BUN  26 H  


 


Creatinine  1.5  


 


Est GFR ( Amer)  59  


 


Est GFR (Non-Af Amer)  48  


 


POC Glucose (mg/dL)   458 H*  > 500 H*


 


Random Glucose  599 H* D  


 


Calcium  8.8  


 


Venous Blood Potassium   


 


Urine Color   


 


Urine Appearance   


 


Urine pH   


 


Ur Specific Gravity   


 


Urine Protein   


 


Urine Glucose (UA)   


 


Urine Ketones   


 


Urine Blood   


 


Urine Nitrate   


 


Urine Bilirubin   


 


Urine Urobilinogen   


 


Ur Leukocyte Esterase   


 


Urine RBC   


 


Urine WBC   


 


Ur Epithelial Cells   


 


Urine Bacteria   














  05/08/18 05/08/18





  14:15 15:46


 


pO2  


 


VBG pH  


 


VBG pCO2  


 


VBG HCO3  


 


VBG Total CO2  


 


VBG O2 Sat (Calc)  


 


VBG Base Excess  


 


VBG Potassium  


 


Sodium  


 


Chloride  


 


Glucose  


 


Lactate  


 


FiO2  


 


Potassium  


 


Carbon Dioxide  


 


Anion Gap  


 


BUN  


 


Creatinine  


 


Est GFR ( Amer)  


 


Est GFR (Non-Af Amer)  


 


POC Glucose (mg/dL)   441 H*


 


Random Glucose  


 


Calcium  


 


Venous Blood Potassium  


 


Urine Color  Light yellow 


 


Urine Appearance  Clear 


 


Urine pH  6.0 


 


Ur Specific Gravity  1.010 


 


Urine Protein  Trace H 


 


Urine Glucose (UA)  >=1000 


 


Urine Ketones  Negative 


 


Urine Blood  Negative 


 


Urine Nitrate  Negative 


 


Urine Bilirubin  Negative 


 


Urine Urobilinogen  0.2 


 


Ur Leukocyte Esterase  Negative 


 


Urine RBC  Negative 


 


Urine WBC  0 - 2 


 


Ur Epithelial Cells  None 


 


Urine Bacteria  Few 














Assessment & Plan





- Assessment and Plan (Free Text)


Assessment: 





58M with bilateral chronic lower extremity edema with elephantiasis to B/L feet


Plan: 





Pt seen and evaluated at bedside with attending Dr. Hobbs


Labs and vitals reviewed- afebrile, WBC 24.1


Legs cleaned with saline and RLE preulcerative lesion dressed with xeroform DSD


B/L ACE compression applied


Will continue to follow pt while in house and perform routine care





<Manuel Hobbs - Last Filed: 05/09/18 13:16>





Meds





- Medications


Medications: 


 Current Medications





Apixaban (Eliquis)  2.5 mg PO BID [facility]


   PRN Reason: Protocol


   Last Admin: 05/09/18 10:06 Dose:  2.5 mg


Atorvastatin Calcium (Lipitor)  10 mg PO [facility]


   Last Admin: 05/08/18 22:11 Dose:  10 mg


Betamethasone/Clotrimazole (Lotrisone)  0 ml TOP BID [facility]


Diltiazem HCl (Cardizem Cd)  120 mg PO DAILY [facility]


   Last Admin: 05/09/18 10:07 Dose:  120 mg


Furosemide (Lasix)  20 mg PO DAILY [facility]


   Last Admin: 05/09/18 10:07 Dose:  20 mg


Gabapentin (Neurontin)  400 mg PO TID [facility]


   PRN Reason: Protocol


   Last Admin: 05/09/18 10:06 Dose:  400 mg


Glipizide (Glucotrol)  10 mg PO BID [facility]


   Last Admin: 05/09/18 10:06 Dose:  10 mg


Ceftaroline Fosamil 400 mg/ (Sodium Chloride)  100 mls @ 100 mls/hr IVPB Q12 [facility]


   PRN Reason: Protocol


   Stop: 05/15/18 07:26


   Last Admin: 05/09/18 10:05 Dose:  100 mls/hr


Sodium Chloride (Sodium Chloride 0.9%)  1,000 mls @ 100 mls/hr IV .Q10H [facility]


   Last Admin: 05/08/18 21:45 Dose:  100 mls/hr


Insulin Human Lispro (Humalog)  10 units SC AC [facility]


   Last Admin: 05/09/18 12:11 Dose:  10 units


Insulin Human Regular (Humulin R High)  0 units SC ACHS [facility]


   PRN Reason: Protocol


   Last Admin: 05/09/18 12:10 Dose:  7 units


Nystatin (Nystop Topical Powder)  0 gm TOP BID [facility]


Pioglitazone HCl (Actos)  30 mg PO DAILY [facility]


   Last Admin: 05/09/18 10:05 Dose:  30 mg


Pioglitazone HCl (Actos)  15 mg PO DAILY [facility]


   Last Admin: 05/09/18 10:06 Dose:  15 mg


Ramipril (Altace)  10 mg PO DAILY [facility]


   Last Admin: 05/09/18 10:07 Dose:  10 mg











Results





- Vital Signs


Recent Vital Signs: 


 Last Vital Signs











Temp  100 F H  05/09/18 08:20


 


Pulse  88   05/09/18 08:20


 


Resp  20   05/09/18 08:20


 


BP  127/70   05/09/18 10:07


 


Pulse Ox  96   05/09/18 08:20














- Labs


Result Diagrams: 


 05/09/18 06:30





 05/09/18 06:30


Labs: 


 Laboratory Results - last 24 hr











  05/08/18 05/08/18 05/08/18





  13:26 14:15 15:46


 


WBC   


 


RBC   


 


Hgb   


 


Hct   


 


MCV   


 


MCH   


 


MCHC   


 


RDW   


 


Plt Count   


 


MPV   


 


Sodium   


 


Potassium   


 


Chloride   


 


Carbon Dioxide   


 


Anion Gap   


 


BUN   


 


Creatinine   


 


Est GFR ( Amer)   


 


Est GFR (Non-Af Amer)   


 


POC Glucose (mg/dL)  > 500 H*   441 H*


 


Random Glucose   


 


Calcium   


 


Total Bilirubin   


 


AST   


 


ALT   


 


Alkaline Phosphatase   


 


Total Protein   


 


Albumin   


 


Globulin   


 


Albumin/Globulin Ratio   


 


Urine Color   Light yellow 


 


Urine Appearance   Clear 


 


Urine pH   6.0 


 


Ur Specific Gravity   1.010 


 


Urine Protein   Trace H 


 


Urine Glucose (UA)   >=1000 


 


Urine Ketones   Negative 


 


Urine Blood   Negative 


 


Urine Nitrate   Negative 


 


Urine Bilirubin   Negative 


 


Urine Urobilinogen   0.2 


 


Ur Leukocyte Esterase   Negative 


 


Urine RBC   Negative 


 


Urine WBC   0 - 2 


 


Ur Epithelial Cells   None 


 


Urine Bacteria   Few 














  05/08/18 05/09/18 05/09/18





  22:07 06:30 06:30


 


WBC   11.7 H D 


 


RBC   4.43 


 


Hgb   12.8 L 


 


Hct   39.1 L 


 


MCV   88.3 


 


MCH   28.9 


 


MCHC   32.7 


 


RDW   13.0 


 


Plt Count   230 


 


MPV   10.1 


 


Sodium    141


 


Potassium    3.9


 


Chloride    107


 


Carbon Dioxide    21


 


Anion Gap    17


 


BUN    28 H


 


Creatinine    0.9


 


Est GFR ( Amer)    > 60


 


Est GFR (Non-Af Amer)    > 60


 


POC Glucose (mg/dL)  276 H  


 


Random Glucose    254 H


 


Calcium    8.6


 


Total Bilirubin    0.5


 


AST    21


 


ALT    28


 


Alkaline Phosphatase    79


 


Total Protein    6.3


 


Albumin    3.4


 


Globulin    2.9


 


Albumin/Globulin Ratio    1.2


 


Urine Color   


 


Urine Appearance   


 


Urine pH   


 


Ur Specific Gravity   


 


Urine Protein   


 


Urine Glucose (UA)   


 


Urine Ketones   


 


Urine Blood   


 


Urine Nitrate   


 


Urine Bilirubin   


 


Urine Urobilinogen   


 


Ur Leukocyte Esterase   


 


Urine RBC   


 


Urine WBC   


 


Ur Epithelial Cells   


 


Urine Bacteria   














Attending/Attestation





- Attestation


I have personally seen and examined this patient.: Yes


I have fully participated in the care of the patient.: Yes


I have reviewed all pertinent clinical information: Yes

## 2018-05-09 LAB
ALBUMIN SERPL-MCNC: 3.4 G/DL (ref 3–4.8)
ALBUMIN/GLOB SERPL: 1.2 {RATIO} (ref 1.1–1.8)
ALT SERPL-CCNC: 28 U/L (ref 7–56)
AST SERPL-CCNC: 21 U/L (ref 17–59)
BUN SERPL-MCNC: 28 MG/DL (ref 7–21)
CALCIUM SERPL-MCNC: 8.6 MG/DL (ref 8.4–10.5)
ERYTHROCYTE [DISTWIDTH] IN BLOOD BY AUTOMATED COUNT: 13 % (ref 11.5–14.5)
GFR NON-AFRICAN AMERICAN: > 60
HGB BLD-MCNC: 12.8 G/DL (ref 14–18)
MCH RBC QN AUTO: 28.9 PG (ref 25–35)
MCHC RBC AUTO-ENTMCNC: 32.7 G/DL (ref 31–37)
MCV RBC AUTO: 88.3 FL (ref 80–105)
PLATELET # BLD: 230 10^3/UL (ref 120–450)
PMV BLD AUTO: 10.1 FL (ref 7–11)
RBC # BLD AUTO: 4.43 10^6/UL (ref 3.5–6.1)
WBC # BLD AUTO: 11.7 10^3/UL (ref 4.5–11)

## 2018-05-09 RX ADMIN — DILTIAZEM HYDROCHLORIDE SCH MG: 120 CAPSULE, COATED, EXTENDED RELEASE ORAL at 10:07

## 2018-05-09 RX ADMIN — NYSTATIN SCH GM: 100000 POWDER TOPICAL at 17:38

## 2018-05-09 RX ADMIN — INSULIN LISPRO SCH UNITS: 100 INJECTION, SOLUTION INTRAVENOUS; SUBCUTANEOUS at 12:11

## 2018-05-09 RX ADMIN — INSULIN HUMAN SCH: 100 INJECTION, SOLUTION PARENTERAL at 23:24

## 2018-05-09 RX ADMIN — INSULIN HUMAN SCH UNITS: 100 INJECTION, SOLUTION PARENTERAL at 12:10

## 2018-05-09 RX ADMIN — INSULIN LISPRO SCH UNITS: 100 INJECTION, SOLUTION INTRAVENOUS; SUBCUTANEOUS at 17:37

## 2018-05-09 RX ADMIN — CLOTRIMAZOLE AND BETAMETHASONE DIPROPIONATE SCH GM: 10; .5 LOTION TOPICAL at 17:38

## 2018-05-09 RX ADMIN — INSULIN HUMAN SCH UNITS: 100 INJECTION, SOLUTION PARENTERAL at 17:37

## 2018-05-09 RX ADMIN — INSULIN LISPRO SCH UNITS: 100 INJECTION, SOLUTION INTRAVENOUS; SUBCUTANEOUS at 08:33

## 2018-05-09 RX ADMIN — INSULIN HUMAN SCH UNITS: 100 INJECTION, SOLUTION PARENTERAL at 08:32

## 2018-05-09 NOTE — CP.PCM.PN
Subjective





- Date & Time of Evaluation


Date of Evaluation: 05/09/18


Time of Evaluation: 12:30





- Subjective


Subjective: 





Pt well known to me for chronic elephantitis and on and ian ulcers to the legs 

- seen at bedside - pt is aphasic but understands when you speak to him; pt was 

seen last week in the wound care center for a healed ulcer to the right LE -- 

we keep the legs wrapped to control the severe edema which in turns controls 

the ulcerations pt gets; apparently according to the ER note pt presented with 

fever and pain in the right LE 





Objective





- Vital Signs/Intake and Output


Vital Signs (last 24 hours): 


 











Temp Pulse Resp BP Pulse Ox


 


 100 F H  88   20   127/70   96 


 


 05/09/18 08:20  05/09/18 08:20  05/09/18 08:20  05/09/18 10:07  05/09/18 08:20








Intake and Output: 


 











 05/09/18 05/09/18





 06:59 18:59


 


Intake Total 780 360


 


Output Total 801 700


 


Balance -21 -340














- Medications


Medications: 


 Current Medications





Apixaban (Eliquis)  2.5 mg PO BID [facility]


   PRN Reason: Protocol


   Last Admin: 05/09/18 10:06 Dose:  2.5 mg


Atorvastatin Calcium (Lipitor)  10 mg PO HS [facility]


   Last Admin: 05/08/18 22:11 Dose:  10 mg


Diltiazem HCl (Cardizem Cd)  120 mg PO DAILY [facility]


   Last Admin: 05/09/18 10:07 Dose:  120 mg


Furosemide (Lasix)  20 mg PO DAILY [facility]


   Last Admin: 05/09/18 10:07 Dose:  20 mg


Gabapentin (Neurontin)  400 mg PO TID [facility]


   PRN Reason: Protocol


   Last Admin: 05/09/18 10:06 Dose:  400 mg


Glipizide (Glucotrol)  10 mg PO BID [facility]


   Last Admin: 05/09/18 10:06 Dose:  10 mg


Ceftaroline Fosamil 400 mg/ (Sodium Chloride)  100 mls @ 100 mls/hr IVPB Q12 [facility]


   PRN Reason: Protocol


   Stop: 05/15/18 07:26


   Last Admin: 05/09/18 10:05 Dose:  100 mls/hr


Sodium Chloride (Sodium Chloride 0.9%)  1,000 mls @ 100 mls/hr IV .Q10H [facility]


   Last Admin: 05/08/18 21:45 Dose:  100 mls/hr


Insulin Human Lispro (Humalog)  10 units SC AC [facility]


   Last Admin: 05/09/18 12:11 Dose:  10 units


Insulin Human Regular (Humulin R High)  0 units SC ACHS [facility]


   PRN Reason: Protocol


   Last Admin: 05/09/18 12:10 Dose:  7 units


Pioglitazone HCl (Actos)  30 mg PO DAILY [facility]


   Last Admin: 05/09/18 10:05 Dose:  30 mg


Pioglitazone HCl (Actos)  15 mg PO DAILY [facility]


   Last Admin: 05/09/18 10:06 Dose:  15 mg


Ramipril (Altace)  10 mg PO DAILY [facility]


   Last Admin: 05/09/18 10:07 Dose:  10 mg











- Labs


Labs: 


 





 05/09/18 06:30 





 05/09/18 06:30 











- Constitutional


Appears: No Acute Distress





- Extremities Exam


Extremities Exam: Normal Capillary Refill, Pedal Edema.  absent: Calf Tenderness


Additional comments: 





VASCULAR:  NP pedal pulses - CFT 3 sec x 10 - TG reversed 


NEUROLOGICAL LE - sensation intact to both feet and legs


SKIN EXAM:  both LE with chronic skin changes from chronic edema; there is 

brawny discoloration secondary scaling and verricoformis lesions; acanthosis 

noted on the feet and toes bilateral; the right leg has a healed wound with no 

signs of infection;





Right thigh is with mild calor and redness - the fold of the right thigh is 

macerated with some malodor but no openings - mild pain on palpation of the 

right thigh medial aspect





Assessment and Plan





- Assessment and Plan (Free Text)


Assessment: 





Diabetes


Lymphedema


Cellulitis to the right thigh


Plan: 





lotrisone cream to feet and legs bilateral bid


nystatin powder between folds of legs bid


may leave dressings off legs at present time since his legs are wrapped 24/7 - 

this will give them some time to air out


discuss with pt the importance of leg elevation

## 2018-05-09 NOTE — CP.PCM.PN
Subjective





- Date & Time of Evaluation


Date of Evaluation: 05/09/18


Time of Evaluation: 09:30





- Subjective


Subjective: 





Surgery: [facility]





Patient seen and examined at bedside. Per nursing staff, no acute events 

overnight. Patient reports feeling slightly better than yesterday. No longer 

complaining of leg pain. Denies fevers, chills, chest pain, abdominal pain, 

cough. ROS limited by patient's baseline mental status.





Objective





- Vital Signs/Intake and Output


Vital Signs (last 24 hours): 


 











Temp Pulse Resp BP Pulse Ox


 


 100 F H  88   20   127/70   96 


 


 05/09/18 08:20  05/09/18 08:20  05/09/18 08:20  05/09/18 10:07  05/09/18 08:20








Intake and Output: 


 











 05/09/18 05/09/18





 06:59 18:59


 


Intake Total 780 


 


Output Total 801 


 


Balance -21 














- Medications


Medications: 


 Current Medications





Apixaban (Eliquis)  2.5 mg PO BID [facility]


   PRN Reason: Protocol


   Last Admin: 05/09/18 10:06 Dose:  2.5 mg


Atorvastatin Calcium (Lipitor)  10 mg PO HS [facility]


   Last Admin: 05/08/18 22:11 Dose:  10 mg


Diltiazem HCl (Cardizem Cd)  120 mg PO DAILY [facility]


   Last Admin: 05/09/18 10:07 Dose:  120 mg


Furosemide (Lasix)  20 mg PO DAILY [facility]


   Last Admin: 05/09/18 10:07 Dose:  20 mg


Gabapentin (Neurontin)  400 mg PO TID [facility]


   PRN Reason: Protocol


   Last Admin: 05/09/18 10:06 Dose:  400 mg


Glipizide (Glucotrol)  10 mg PO BID [facility]


   Last Admin: 05/09/18 10:06 Dose:  10 mg


Ceftaroline Fosamil 400 mg/ (Sodium Chloride)  100 mls @ 100 mls/hr IVPB Q12 [facility]


   PRN Reason: Protocol


   Stop: 05/15/18 07:26


   Last Admin: 05/09/18 10:05 Dose:  100 mls/hr


Sodium Chloride (Sodium Chloride 0.9%)  1,000 mls @ 100 mls/hr IV .Q10H [facility]


   Last Admin: 05/08/18 21:45 Dose:  100 mls/hr


Insulin Human Lispro (Humalog)  10 units SC AC [facility]


   Last Admin: 05/09/18 08:33 Dose:  10 units


Insulin Human Regular (Humulin R High)  0 units SC ACHS [facility]


   PRN Reason: Protocol


   Last Admin: 05/09/18 08:32 Dose:  4 units


Pioglitazone HCl (Actos)  30 mg PO DAILY [facility]


   Last Admin: 05/09/18 10:05 Dose:  30 mg


Pioglitazone HCl (Actos)  15 mg PO DAILY [facility]


   Last Admin: 05/09/18 10:06 Dose:  15 mg


Ramipril (Altace)  10 mg PO DAILY [facility]


   Last Admin: 05/09/18 10:07 Dose:  10 mg











- Labs


Labs: 


 





 05/09/18 06:30 





 05/09/18 06:30 











- Constitutional


Appears: Non-toxic, No Acute Distress, Chronically Ill





- Head Exam


Head Exam: ATRAUMATIC, NORMOCEPHALIC





- Eye Exam


Eye Exam: Normal appearance





- ENT Exam


ENT Exam: Mucous Membranes Moist





- Neck Exam


Neck Exam: Normal Inspection





- Respiratory Exam


Respiratory Exam: NORMAL BREATHING PATTERN





- Cardiovascular Exam


Cardiovascular Exam: REGULAR RHYTHM





- GI/Abdominal Exam


GI & Abdominal Exam: Soft.  absent: Distended, Firm, Guarding, Rigid, Tenderness





- Extremities Exam


Additional comments: 





Bilateral legs with ace compression wraps toes to knees. Bilateral thighs with 

swelling, lymphedema. No focal induration, ulceration, or drainage.





- Neurological Exam


Neurological Exam: Alert, Awake





Assessment and Plan





- Assessment and Plan (Free Text)


Assessment: 





59 yo M with chronic lower extremity lymphedema and history of chronic 

ulcerations but no active ulcerations at this time, presenting with fever and 

leg pain; SIRS/Sepsis with unknown source


Plan: 


- f/u septic workup; leukocytosis improving; preliminary cultures negative, 

pending final results


- Continue with leg elevation


- Continue with compression wraps


- Ordered air mattress


- Will discuss with Dr. Rolando Martinez PGY1

## 2018-05-09 NOTE — CP.PCM.PN
Subjective





- Date & Time of Evaluation


Date of Evaluation: 05/09/18


Time of Evaluation: 11:30





- Subjective


Subjective: 





Patient is looking better, but still with low grade fevers (but temperatures 

are lower compared to on admission). 





Objective





- Vital Signs/Intake and Output


Vital Signs (last 24 hours): 


 











Temp Pulse Resp BP Pulse Ox


 


 100 F H  88   20   127/70   96 


 


 05/09/18 08:20  05/09/18 08:20  05/09/18 08:20  05/09/18 08:20  05/09/18 08:20








Intake and Output: 


 











 05/09/18 05/09/18





 06:59 18:59


 


Intake Total 780 


 


Output Total 801 


 


Balance -21 














- Medications


Medications: 


 Current Medications





Apixaban (Eliquis)  2.5 mg PO BID [facility]


   PRN Reason: Protocol


   Last Admin: 05/08/18 17:14 Dose:  2.5 mg


Atorvastatin Calcium (Lipitor)  10 mg PO HS [facility]


   Last Admin: 05/08/18 22:11 Dose:  10 mg


Diltiazem HCl (Cardizem Cd)  120 mg PO DAILY [facility]


   Last Admin: 05/08/18 11:15 Dose:  120 mg


Furosemide (Lasix)  20 mg PO DAILY [facility]


   Last Admin: 05/08/18 11:13 Dose:  20 mg


Gabapentin (Neurontin)  400 mg PO TID [facility]


   PRN Reason: Protocol


   Last Admin: 05/08/18 17:15 Dose:  400 mg


Glipizide (Glucotrol)  10 mg PO BID [facility]


   Last Admin: 05/08/18 17:14 Dose:  10 mg


Ceftaroline Fosamil 400 mg/ (Sodium Chloride)  100 mls @ 100 mls/hr IVPB Q12 [facility]


   PRN Reason: Protocol


   Stop: 05/15/18 07:26


   Last Admin: 05/08/18 21:44 Dose:  100 mls/hr


Sodium Chloride (Sodium Chloride 0.9%)  1,000 mls @ 100 mls/hr IV .Q10H [facility]


   Last Admin: 05/08/18 21:45 Dose:  100 mls/hr


Insulin Human Lispro (Humalog)  10 units SC AC [facility]


   Last Admin: 05/09/18 08:33 Dose:  10 units


Insulin Human Regular (Humulin R High)  0 units SC ACHS [facility]


   PRN Reason: Protocol


   Last Admin: 05/09/18 08:32 Dose:  4 units


Pioglitazone HCl (Actos)  30 mg PO DAILY [facility]


Pioglitazone HCl (Actos)  15 mg PO DAILY [facility]


Ramipril (Altace)  10 mg PO DAILY [facility]


   Last Admin: 05/08/18 11:16 Dose:  10 mg











- Labs


Labs: 


 





 05/09/18 06:30 





 05/09/18 06:30 











- Constitutional


Appears: Non-toxic, Chronically Ill





- Head Exam


Head Exam: NORMAL INSPECTION





- ENT Exam


ENT Exam: Mucous Membranes Moist





- Neck Exam


Neck Exam: absent: Meningismus





- Respiratory Exam


Respiratory Exam: Decreased Breath Sounds





- Cardiovascular Exam


Cardiovascular Exam: +S1, +S2





- GI/Abdominal Exam


GI & Abdominal Exam: Soft.  absent: Tenderness





- Extremities Exam


Additional comments: 





both lower extremities with dry dressings in place





Assessment and Plan





- Assessment and Plan (Free Text)


Plan: 





Assessment


consider sepsis due to right leg skin and skin structure infection associated 

with chronic leg ulcers,. slowly improving


history of MRSA and Klebsiella oxytoca right leg cellulitis


history of sepsis secondary to right leg skin/skin structure infection with 

chronic leg ulcers, growing MRSA and sensitive Klebsiella 


history of MRSA cellulitis Sept. 2016


history of Bilateral lower extremity skin and skin structure infection (

Enterobacter, Klebsiella Oxytoca and Group B Strep, as well as MRSA) which was 

treated 9/2015; MRSA and Pseudomonas cellulitis of left leg in Dec 2015, May 

and June 2016


chronic lymphedema of the lower extremities


Morbid obesity with BMI 55


HTN


DM


history of Strep bacteremia


history of hernia surgery


Learning disability





Plan


continue Teflaro day 2; pending wound cx; blood cx are negative; follow up 

further Podiatry evaluation and recommendations


will continue monitor clinical response, trend WBC count and fever curve (both 

improving)

## 2018-05-09 NOTE — PN
DATE:  05/09/2018



SUBJECTIVE:  The patient has no complaints of any chest pain, shortness of

breath.  No headaches or dizziness.



OBJECTIVE:

VITAL SIGNS:  Temperature is 100, pulse of 88, blood pressure 127/70,

respirations 20.

GENERAL:  The patient is lying in bed, flat, comfortable.

HEENT:  No oral lesion.  Anicteric sclerae.  Moist mucosa.

NECK:  No JVD, adenopathy, or thyromegaly.

CARDIOVASCULAR:  S1 and S2, regular.  No murmurs, rubs, or gallops.

LUNGS:  Clear to auscultation bilaterally.  No wheeze, rales, or rhonchi.

ABDOMEN:  Bowel sounds are positive.  Soft, nontender and nondistended.

EXTREMITIES:  No cyanosis, clubbing, chronic skin changes



DATA:  White count of 11.7, hemoglobin 12.8, creatinine 0.9.



ASSESSMENT:

1.  Sepsis.

2.  Nephrolithiasis.

3.  Lymphedema of the legs.

4.  Obesity with a body mass index of 40.

5.  Atrial fibrillation, on Eliquis.

6.  Hypertension.

7.  Dyslipidemia.

8.  Diabetes type 2, uncontrolled.



PLAN:  The patient is on Actos.  He is going to continue on Altace.  Blood

cultures have been negative.  The patient's white count has improved, it is

11.7 this morning.  The patient had sugars that were uncontrolled, he is

currently on insulin.  He is on Glucotrol for his diabetes.  The patient is

on Lasix daily.  He is on Lipitor for dyslipidemia, is on Neurontin for his

neuropathy.  He is going to continue with Teflaro for antibiotics.  He is

currently comfortable.  He is on fingersticks.  He is being followed by ID

and Surgery.







__________________________________________

Delbert Corona MD



DD:  05/09/2018 9:21:51

DT:  05/09/2018 9:24:17

Job # [id number]



[email or web address]

## 2018-05-10 RX ADMIN — INSULIN HUMAN SCH UNITS: 100 INJECTION, SOLUTION PARENTERAL at 12:42

## 2018-05-10 RX ADMIN — INSULIN HUMAN SCH: 100 INJECTION, SOLUTION PARENTERAL at 17:23

## 2018-05-10 RX ADMIN — CLOTRIMAZOLE AND BETAMETHASONE DIPROPIONATE SCH GM: 10; .5 LOTION TOPICAL at 10:04

## 2018-05-10 RX ADMIN — NYSTATIN SCH GM: 100000 POWDER TOPICAL at 17:29

## 2018-05-10 RX ADMIN — INSULIN LISPRO SCH UNITS: 100 INJECTION, SOLUTION INTRAVENOUS; SUBCUTANEOUS at 12:42

## 2018-05-10 RX ADMIN — DILTIAZEM HYDROCHLORIDE SCH MG: 120 CAPSULE, COATED, EXTENDED RELEASE ORAL at 10:03

## 2018-05-10 RX ADMIN — NYSTATIN SCH GM: 100000 POWDER TOPICAL at 10:03

## 2018-05-10 RX ADMIN — INSULIN LISPRO SCH UNITS: 100 INJECTION, SOLUTION INTRAVENOUS; SUBCUTANEOUS at 17:28

## 2018-05-10 RX ADMIN — INSULIN HUMAN SCH: 100 INJECTION, SOLUTION PARENTERAL at 22:48

## 2018-05-10 RX ADMIN — INSULIN LISPRO SCH UNITS: 100 INJECTION, SOLUTION INTRAVENOUS; SUBCUTANEOUS at 08:24

## 2018-05-10 RX ADMIN — INSULIN HUMAN SCH UNITS: 100 INJECTION, SOLUTION PARENTERAL at 08:24

## 2018-05-10 RX ADMIN — CLOTRIMAZOLE AND BETAMETHASONE DIPROPIONATE SCH GM: 10; .5 LOTION TOPICAL at 17:29

## 2018-05-10 NOTE — CP.PCM.PN
Subjective





- Date & Time of Evaluation


Date of Evaluation: 05/10/18


Time of Evaluation: 22:02





- Subjective


Subjective: 


57 y/o non verbal male seen at bedside for chronic elephantitis and lower 

extremity edema with intermittent ulcerations to B/L lower extremities. Pt 

aphasic but understands when you speak to him. Pt denies any events overnight 

and denies any pain to his LE. Denies F/C/N/V/CP/SOB

















Objective





- Vital Signs/Intake and Output


Vital Signs (last 24 hours): 


 











Temp Pulse Resp BP Pulse Ox


 


 97.7 F   70   20   132/67   97 


 


 05/10/18 14:35  05/10/18 14:35  05/10/18 14:35  05/10/18 14:35  05/10/18 14:35











- Medications


Medications: 


 Current Medications





Apixaban (Eliquis)  2.5 mg PO BID [facility]


   PRN Reason: Protocol


   Last Admin: 05/10/18 17:28 Dose:  2.5 mg


Atorvastatin Calcium (Lipitor)  10 mg PO HS [facility]


   Last Admin: 05/09/18 21:35 Dose:  10 mg


Betamethasone/Clotrimazole (Lotrisone)  0 ml TOP BID [facility]


   Last Admin: 05/10/18 17:29 Dose:  1 gm


Diltiazem HCl (Cardizem Cd)  120 mg PO DAILY [facility]


   Last Admin: 05/10/18 10:03 Dose:  120 mg


Furosemide (Lasix)  20 mg PO DAILY [facility]


   Last Admin: 05/10/18 10:03 Dose:  20 mg


Gabapentin (Neurontin)  400 mg PO TID [facility]


   PRN Reason: Protocol


   Last Admin: 05/10/18 17:28 Dose:  400 mg


Glipizide (Glucotrol)  10 mg PO BID [facility]


   Last Admin: 05/10/18 17:28 Dose:  10 mg


Ceftaroline Fosamil 400 mg/ (Sodium Chloride)  100 mls @ 100 mls/hr IVPB Q12 [facility]


   PRN Reason: Protocol


   Stop: 05/15/18 07:26


   Last Admin: 05/10/18 10:02 Dose:  100 mls/hr


Sodium Chloride (Sodium Chloride 0.9%)  1,000 mls @ 100 mls/hr IV .Q10H [facility]


   Last Admin: 05/10/18 17:27 Dose:  100 mls/hr


Insulin Human Lispro (Humalog)  10 units SC AC [facility]


   Last Admin: 05/10/18 17:28 Dose:  10 units


Insulin Human Regular (Humulin R High)  0 units SC ACHS [facility]


   PRN Reason: Protocol


   Last Admin: 05/10/18 17:23 Dose:  Not Given


Nystatin (Nystop Topical Powder)  0 gm TOP BID [facility]


   Last Admin: 05/10/18 17:29 Dose:  1 gm


Pioglitazone HCl (Actos)  45 mg PO DAILY [facility]


   Last Admin: 05/10/18 10:08 Dose:  45 mg


Ramipril (Altace)  10 mg PO DAILY [facility]


   Last Admin: 05/10/18 10:03 Dose:  10 mg











- Labs


Labs: 


 





 05/09/18 06:30 





 05/09/18 06:30 











- Constitutional


Appears: Well, Non-toxic, No Acute Distress





- Extremities Exam


Additional comments: 


Lower extremity focused exam:


Vasc: DP/PT pulses non palpable secondary to edema. Temperature gradient 

reversed. CFT < 3 sec to all digits. Diffuse non pitting pedal edema noted B/L 

from tibial tuberosity to digits


Derm: Diffuse elephantiasis skin changes noted to dorsum of foot with secondary 

scaling B/L. Healed ulcerative lesion noted to anterior mid right leg with dry 

sanguinous drainage - no purulence, no malodor, no fluctuance. No other open 

lesions or clinical suspicion for infection. Mild calor and rubor noted to 

right thigh. Right thigh folds are macerated with no fissures or breaks in skin.


Neuro: Protective sensation grossly intact


Ortho: No tenderness to posterior calf B/L. No tenderness to palpation of B/L 

lower extremities

















- Neurological Exam


Neurological Exam: Alert, Awake, Oriented x3





- Psychiatric Exam


Psychiatric exam: Normal Affect, Normal Mood





Assessment and Plan





- Assessment and Plan (Free Text)


Assessment: 





57 y/o diabetic male with 1) bilateral lower extremity lymphedema and 2) 

cellulitis to right thigh








Plan:


Pt seen and evaluated at bedside


Labs and vitals reviewed- afebrile, leukocytosis resolving (WBC 11.7 from 24.1)


Continue IV abx per ID 


Lotrisone cream to feet and legs bilateral BID


Nystatin powder between folds of legs BID


ACE compression dressings to B/L lower extremities


Will continue to follow while in house

## 2018-05-10 NOTE — CP.PCM.PN
Subjective





- Date & Time of Evaluation


Date of Evaluation: 05/10/18


Time of Evaluation: 11:05





- Subjective


Subjective: 





Comfortable in bed, no fevers, not in distress.





Objective





- Vital Signs/Intake and Output


Vital Signs (last 24 hours): 


 











Temp Pulse Resp BP Pulse Ox


 


 98.7 F   83   20   153/84 H  96 


 


 05/10/18 08:15  05/10/18 08:15  05/10/18 08:15  05/10/18 08:15  05/10/18 08:15








Intake and Output: 


 











 05/10/18 05/10/18





 06:59 18:59


 


Intake Total 180 


 


Output Total 500 


 


Balance -320 














- Medications


Medications: 


 Current Medications





Apixaban (Eliquis)  2.5 mg PO BID [facility]


   PRN Reason: Protocol


   Last Admin: 05/09/18 17:38 Dose:  2.5 mg


Atorvastatin Calcium (Lipitor)  10 mg PO HS [facility]


   Last Admin: 05/09/18 21:35 Dose:  10 mg


Betamethasone/Clotrimazole (Lotrisone)  0 ml TOP BID [facility]


   Last Admin: 05/09/18 17:38 Dose:  1 gm


Diltiazem HCl (Cardizem Cd)  120 mg PO DAILY [facility]


   Last Admin: 05/09/18 10:07 Dose:  120 mg


Furosemide (Lasix)  20 mg PO DAILY [facility]


   Last Admin: 05/09/18 10:07 Dose:  20 mg


Gabapentin (Neurontin)  400 mg PO TID [facility]


   PRN Reason: Protocol


   Last Admin: 05/09/18 17:38 Dose:  400 mg


Glipizide (Glucotrol)  10 mg PO BID [facility]


   Last Admin: 05/09/18 17:38 Dose:  10 mg


Ceftaroline Fosamil 400 mg/ (Sodium Chloride)  100 mls @ 100 mls/hr IVPB Q12 [facility]


   PRN Reason: Protocol


   Stop: 05/15/18 07:26


   Last Admin: 05/09/18 21:35 Dose:  100 mls/hr


Sodium Chloride (Sodium Chloride 0.9%)  1,000 mls @ 100 mls/hr IV .Q10H [facility]


   Last Admin: 05/09/18 17:39 Dose:  100 mls/hr


Insulin Human Lispro (Humalog)  10 units SC AC [facility]


   Last Admin: 05/10/18 08:24 Dose:  10 units


Insulin Human Regular (Humulin R High)  0 units SC ACHS [facility]


   PRN Reason: Protocol


   Last Admin: 05/10/18 08:24 Dose:  2 units


Nystatin (Nystop Topical Powder)  0 gm TOP BID [facility]


   Last Admin: 05/09/18 17:38 Dose:  1 gm


Pioglitazone HCl (Actos)  30 mg PO DAILY [facility]


   Last Admin: 05/09/18 10:05 Dose:  30 mg


Pioglitazone HCl (Actos)  15 mg PO DAILY [facility]


   Last Admin: 05/09/18 10:06 Dose:  15 mg


Ramipril (Altace)  10 mg PO DAILY [facility]


   Last Admin: 05/09/18 10:07 Dose:  10 mg











- Labs


Labs: 


 





 05/09/18 06:30 





 05/09/18 06:30 











- Constitutional


Appears: Chronically Ill





- Head Exam


Head Exam: NORMAL INSPECTION





- Neck Exam


Neck Exam: absent: Meningismus





- Respiratory Exam


Respiratory Exam: Decreased Breath Sounds





- Cardiovascular Exam


Cardiovascular Exam: +S1, +S2





- GI/Abdominal Exam


GI & Abdominal Exam: Soft.  absent: Tenderness





- Extremities Exam


Additional comments: 





both lower extremities with dry dressings in place





Assessment and Plan





- Assessment and Plan (Free Text)


Plan: 





Assessment


consider sepsis due to right leg skin and skin structure infection associated 

with chronic leg ulcers, slowly improving


history of MRSA and Klebsiella oxytoca right leg cellulitis


history of sepsis secondary to right leg skin/skin structure infection with 

chronic leg ulcers, growing MRSA and sensitive Klebsiella 


history of MRSA cellulitis Sept. 2016


history of Bilateral lower extremity skin and skin structure infection (

Enterobacter, Klebsiella Oxytoca and Group B Strep, as well as MRSA) which was 

treated 9/2015; MRSA and Pseudomonas cellulitis of left leg in Dec 2015, May 

and June 2016


chronic lymphedema of the lower extremities


Morbid obesity with BMI 55


HTN


DM


history of Strep bacteremia


history of hernia surgery


Learning disability





Plan


continue Teflaro day 3, may switch to Zyvox and Rocephin to complete 7-10 days 

of therapy; cultures have been negative

## 2018-05-10 NOTE — CP.PCM.PN
Subjective





- Date & Time of Evaluation


Date of Evaluation: 05/10/18


Time of Evaluation: 07:30





- Subjective


Subjective: 





Surgery: [facility]





Patient seen and examined at bedside. Per nursing staff, no acute events 

overnight. Patient has legs partially wrapped. Denies any pain. Denies any 

further fever or chills since admission. Feels better overall. ROS limited by 

patient's baseline mental status.





Objective





- Vital Signs/Intake and Output


Vital Signs (last 24 hours): 


 











Temp Pulse Resp BP Pulse Ox


 


 97.7 F   84   18   139/58 L  97 


 


 05/09/18 22:00  05/09/18 22:00  05/09/18 22:00  05/09/18 22:00  05/09/18 22:00








Intake and Output: 


 











 05/10/18 05/10/18





 06:59 18:59


 


Intake Total 180 


 


Output Total 500 


 


Balance -320 














- Medications


Medications: 


 Current Medications





Apixaban (Eliquis)  2.5 mg PO BID [facility]


   PRN Reason: Protocol


   Last Admin: 05/09/18 17:38 Dose:  2.5 mg


Atorvastatin Calcium (Lipitor)  10 mg PO HS [facility]


   Last Admin: 05/09/18 21:35 Dose:  10 mg


Betamethasone/Clotrimazole (Lotrisone)  0 ml TOP BID [facility]


   Last Admin: 05/09/18 17:38 Dose:  1 gm


Diltiazem HCl (Cardizem Cd)  120 mg PO DAILY [facility]


   Last Admin: 05/09/18 10:07 Dose:  120 mg


Furosemide (Lasix)  20 mg PO DAILY [facility]


   Last Admin: 05/09/18 10:07 Dose:  20 mg


Gabapentin (Neurontin)  400 mg PO TID [facility]


   PRN Reason: Protocol


   Last Admin: 05/09/18 17:38 Dose:  400 mg


Glipizide (Glucotrol)  10 mg PO BID [facility]


   Last Admin: 05/09/18 17:38 Dose:  10 mg


Ceftaroline Fosamil 400 mg/ (Sodium Chloride)  100 mls @ 100 mls/hr IVPB Q12 [facility]


   PRN Reason: Protocol


   Stop: 05/15/18 07:26


   Last Admin: 05/09/18 21:35 Dose:  100 mls/hr


Sodium Chloride (Sodium Chloride 0.9%)  1,000 mls @ 100 mls/hr IV .Q10H [facility]


   Last Admin: 05/09/18 17:39 Dose:  100 mls/hr


Insulin Human Lispro (Humalog)  10 units SC AC [facility]


   Last Admin: 05/09/18 17:37 Dose:  10 units


Insulin Human Regular (Humulin R High)  0 units SC ACHS [facility]


   PRN Reason: Protocol


   Last Admin: 05/09/18 23:24 Dose:  Not Given


Nystatin (Nystop Topical Powder)  0 gm TOP BID [facility]


   Last Admin: 05/09/18 17:38 Dose:  1 gm


Pioglitazone HCl (Actos)  30 mg PO DAILY [facility]


   Last Admin: 05/09/18 10:05 Dose:  30 mg


Pioglitazone HCl (Actos)  15 mg PO DAILY [facility]


   Last Admin: 05/09/18 10:06 Dose:  15 mg


Ramipril (Altace)  10 mg PO DAILY [facility]


   Last Admin: 05/09/18 10:07 Dose:  10 mg











- Labs


Labs: 


 





 05/09/18 06:30 





 05/09/18 06:30 











- Constitutional


Appears: Non-toxic, No Acute Distress, Chronically Ill





- Head Exam


Head Exam: ATRAUMATIC, NORMOCEPHALIC





- Eye Exam


Eye Exam: Normal appearance





- ENT Exam


ENT Exam: Mucous Membranes Moist





- Respiratory Exam


Respiratory Exam: NORMAL BREATHING PATTERN





- Extremities Exam


Additional comments: 





Bilateral legs wrapped to thighs with ace wraps and abdominal binders over both 

thighs. Upon unwrapping, bilateral legs with chronic venous stasis changes. No 

active ulceration noted; no drainage or bleeding. 





- Neurological Exam


Neurological Exam: Alert, Awake





Assessment and Plan





- Assessment and Plan (Free Text)


Assessment: 





57 yo M with chronic lower extremity lymphedema and history of chronic 

ulcerations but no active ulcerations at this time, presenting with fever and 

leg pain; SIRS/Sepsis with unknown source


Plan: 


- f/u septic workup; ID recs


- Rewrapped legs; abdominal binders to each thigh and ace compression wraps 

from feet, to thighs, overlapping with abdominal binders


- Continue with leg elevation


- No surgical intervention indicated at this time


- Will discuss with Dr. Rolando Martinez PGY1

## 2018-05-10 NOTE — PN
DATE:  05/10/2018



SUBJECTIVE:  The patient has no complaints of any chest pain, no shortness

of breath.  No headaches or dizziness.



PHYSICAL EXAMINATION:

VITAL SIGNS:  Temperature is 97.7, pulse of 84, blood pressure 139/58,

respirations 18.

GENERAL:  The patient is lying in bed, flat, comfortable.

HEENT:  No oral lesion.  Anicteric sclerae.  Moist mucosa.

NECK:  No JVD, adenopathy, or thyromegaly.

CARDIOVASCULAR:  S1 and S2, regular.  No murmurs, rubs, or gallops.

LUNGS:  Clear to auscultation bilaterally.  No wheeze, rales, or rhonchi.

ABDOMEN:  Bowel sounds are positive, soft, nontender and nondistended.

EXTREMITIES:  In lower extremities, there are chronic skin changes,

lymphedema.



ASSESSMENT:

1.  Sepsis.

2.  Nephrolithiasis.

3.  Lymphedema of the legs.

4.  Obesity with a body mass index of 40.

5.  Atrial fibrillation on Eliquis.

6.  Hypertension.

7.  Dyslipidemia.

8.  Diabetes type 2.



PLAN:  The patient is currently comfortable.  His temperature, he is

afebrile.  He is on Teflaro.  He is on Glucotrol for his diabetes.  He is

going to continue with ramipril for his hypertension.  The patient is going

to be on Actos for his diabetes.  He is on Cardizem for his atrial

fibrillation.  He is going to continue with Neurontin for his neuropathy. 

The patient is on a carbohydrate consistent diet.  His glucose has improved

significantly.  His elevated fingersticks were mostly likely secondary to

sepsis.  We will see if the patient qualifies for the Transitional Care

Unit.





__________________________________________

Delbert Corona MD



DD:  05/10/2018 6:03:23

DT:  05/10/2018 7:22:23

Job # [id number]

## 2018-05-10 NOTE — CON
DATE:



HISTORY OF PRESENT ILLNESS:  I saw Mr. Cotton on 05/09/2018 having seen him

multiple times over multiple years.  He was admitted for cellulitis in both

legs, mostly in the right, being treated appropriately with antibiotics

covered by Dr. Villegas.



PHYSICAL EXAMINATION:

VITAL SIGNS:  His vital signs are normal.  Blood pressure is a little bit

high, 150/80.  His weight is reported as 219 pounds, I doubt that.

GENERAL:  He is morbidly obese.

CHEST:  Unremarkable.

HEART:  Unremarkable.

EXTREMITIES:  The swelling in his legs is marked with lymphedema

bilaterally, right greater than left, with marked changes below the legs. 

However, in the last several months and years, both legs were markedly

smaller than before.  I would estimate the size were about 50% of what they

were a year ago and below the knees are well controlled with very little

swelling, but there is lymphedema and chronic changes in the feet.  There

are no open ulcers at the moment.  Cardiovascular is otherwise intact.



PLAN:  The plan will be to continue conservative compression measures,

antibiotics and I will periodically see Jayme.  Right now, I have ordered

elevation, Ace bandages, and thigh abdominal binders.  Plan will be

continue with the antibiotics, followed by both myself and Dr. Ramirez.





__________________________________________

Amor Marshall MD



DD:  05/10/2018 14:49:33

DT:  05/10/2018 20:08:49

Job # [id number]

## 2018-05-11 ENCOUNTER — HOSPITAL ENCOUNTER (INPATIENT)
Dept: HOSPITAL 42 - TRCU | Age: 58
LOS: 8 days | Discharge: HOME HEALTH | DRG: 872 | End: 2018-05-19
Attending: INTERNAL MEDICINE | Admitting: INTERNAL MEDICINE
Payer: COMMERCIAL

## 2018-05-11 VITALS
SYSTOLIC BLOOD PRESSURE: 128 MMHG | DIASTOLIC BLOOD PRESSURE: 68 MMHG | HEART RATE: 74 BPM | TEMPERATURE: 98 F | OXYGEN SATURATION: 96 %

## 2018-05-11 VITALS — RESPIRATION RATE: 20 BRPM

## 2018-05-11 VITALS — BODY MASS INDEX: 35.3 KG/M2

## 2018-05-11 DIAGNOSIS — I87.2: ICD-10-CM

## 2018-05-11 DIAGNOSIS — E11.622: ICD-10-CM

## 2018-05-11 DIAGNOSIS — F79: ICD-10-CM

## 2018-05-11 DIAGNOSIS — I48.91: ICD-10-CM

## 2018-05-11 DIAGNOSIS — I11.0: ICD-10-CM

## 2018-05-11 DIAGNOSIS — I50.9: ICD-10-CM

## 2018-05-11 DIAGNOSIS — L97.919: ICD-10-CM

## 2018-05-11 DIAGNOSIS — N20.0: ICD-10-CM

## 2018-05-11 DIAGNOSIS — I87.8: ICD-10-CM

## 2018-05-11 DIAGNOSIS — E66.01: ICD-10-CM

## 2018-05-11 DIAGNOSIS — F81.9: ICD-10-CM

## 2018-05-11 DIAGNOSIS — Z86.14: ICD-10-CM

## 2018-05-11 DIAGNOSIS — R47.01: ICD-10-CM

## 2018-05-11 DIAGNOSIS — L03.115: ICD-10-CM

## 2018-05-11 DIAGNOSIS — G40.909: ICD-10-CM

## 2018-05-11 DIAGNOSIS — A41.9: Primary | ICD-10-CM

## 2018-05-11 DIAGNOSIS — L03.116: ICD-10-CM

## 2018-05-11 DIAGNOSIS — I89.0: ICD-10-CM

## 2018-05-11 DIAGNOSIS — E78.5: ICD-10-CM

## 2018-05-11 DIAGNOSIS — Z87.891: ICD-10-CM

## 2018-05-11 RX ADMIN — CLOTRIMAZOLE AND BETAMETHASONE DIPROPIONATE SCH APPLIC: 10; .5 CREAM TOPICAL at 17:56

## 2018-05-11 RX ADMIN — INSULIN HUMAN SCH: 100 INJECTION, SOLUTION PARENTERAL at 21:50

## 2018-05-11 RX ADMIN — NYSTATIN SCH GM: 100000 POWDER TOPICAL at 09:30

## 2018-05-11 RX ADMIN — NYSTATIN SCH APPLIC: 100000 POWDER TOPICAL at 17:56

## 2018-05-11 RX ADMIN — INSULIN HUMAN SCH UNITS: 100 INJECTION, SOLUTION PARENTERAL at 12:11

## 2018-05-11 RX ADMIN — CLOTRIMAZOLE AND BETAMETHASONE DIPROPIONATE SCH GM: 10; .5 LOTION TOPICAL at 09:30

## 2018-05-11 RX ADMIN — INSULIN HUMAN SCH UNITS: 100 INJECTION, SOLUTION PARENTERAL at 08:23

## 2018-05-11 RX ADMIN — INSULIN HUMAN SCH: 100 INJECTION, SOLUTION PARENTERAL at 17:20

## 2018-05-11 RX ADMIN — DILTIAZEM HYDROCHLORIDE SCH MG: 120 CAPSULE, COATED, EXTENDED RELEASE ORAL at 09:29

## 2018-05-11 NOTE — CP.PCM.PN
Subjective





- Date & Time of Evaluation


Date of Evaluation: 05/11/18


Time of Evaluation: 11:10





- Subjective


Subjective: 





Patient is resting comfortably in bed, no fevers, no diarrhea. No vomiting.





Objective





- Vital Signs/Intake and Output


Vital Signs (last 24 hours): 


 











Temp Pulse Resp BP Pulse Ox


 


 98.4 F   66   20   155/82 H  95 


 


 05/11/18 07:00  05/11/18 07:00  05/11/18 07:00  05/11/18 07:00  05/11/18 07:00








Intake and Output: 


 











 05/11/18 05/11/18





 06:59 18:59


 


Intake Total  240


 


Output Total 1550 500


 


Balance -1550 -260














- Medications


Medications: 


 Current Medications





Apixaban (Eliquis)  2.5 mg PO BID [facility]


   PRN Reason: Protocol


   Last Admin: 05/10/18 17:28 Dose:  2.5 mg


Atorvastatin Calcium (Lipitor)  10 mg PO HS [facility]


   Last Admin: 05/10/18 22:25 Dose:  10 mg


Betamethasone/Clotrimazole (Lotrisone)  0 ml TOP BID [facility]


   Last Admin: 05/10/18 17:29 Dose:  1 gm


Diltiazem HCl (Cardizem Cd)  120 mg PO DAILY [facility]


   Last Admin: 05/10/18 10:03 Dose:  120 mg


Furosemide (Lasix)  20 mg PO DAILY [facility]


   Last Admin: 05/10/18 10:03 Dose:  20 mg


Gabapentin (Neurontin)  400 mg PO TID [facility]


   PRN Reason: Protocol


   Last Admin: 05/10/18 17:28 Dose:  400 mg


Glipizide (Glucotrol)  10 mg PO BID [facility]


   Last Admin: 05/10/18 17:28 Dose:  10 mg


Ceftaroline Fosamil 400 mg/ (Sodium Chloride)  100 mls @ 100 mls/hr IVPB Q12 [facility]


   PRN Reason: Protocol


   Stop: 05/15/18 07:26


   Last Admin: 05/10/18 22:25 Dose:  100 mls/hr


Insulin Human Regular (Humulin R High)  0 units SC ACHS [facility]


   PRN Reason: Protocol


   Last Admin: 05/11/18 08:23 Dose:  4 units


Metformin HCl (Glucophage)  1,000 mg PO BID [facility]


Nystatin (Nystop Topical Powder)  0 gm TOP BID [facility]


   Last Admin: 05/10/18 17:29 Dose:  1 gm


Pioglitazone HCl (Actos)  45 mg PO DAILY [facility]


   Last Admin: 05/10/18 10:08 Dose:  45 mg


Ramipril (Altace)  10 mg PO DAILY [facility]


   Last Admin: 05/10/18 10:03 Dose:  10 mg











- Labs


Labs: 


 





 05/09/18 06:30 





 05/09/18 06:30 











- Constitutional


Appears: Non-toxic, Chronically Ill





- Head Exam


Head Exam: NORMAL INSPECTION





- Neck Exam


Neck Exam: absent: Meningismus





- Respiratory Exam


Respiratory Exam: Decreased Breath Sounds





- Cardiovascular Exam


Cardiovascular Exam: +S1, +S2





- GI/Abdominal Exam


GI & Abdominal Exam: Soft.  absent: Tenderness





Assessment and Plan





- Assessment and Plan (Free Text)


Plan: 





Assessment


consider sepsis due to right leg skin and skin structure infection associated 

with chronic leg ulcers, slowly improving


history of MRSA and Klebsiella oxytoca right leg cellulitis


history of sepsis secondary to right leg skin/skin structure infection with 

chronic leg ulcers, growing MRSA and sensitive Klebsiella 


history of MRSA cellulitis Sept. 2016


history of Bilateral lower extremity skin and skin structure infection (

Enterobacter, Klebsiella Oxytoca and Group B Strep, as well as MRSA) which was 

treated 9/2015; MRSA and Pseudomonas cellulitis of left leg in Dec 2015, May 

and June 2016


chronic lymphedema of the lower extremities


Morbid obesity with BMI 55


HTN


DM


history of Strep bacteremia


history of hernia surgery


Learning disability





Plan


switched Teflaro to switch to Zyvox and Rocephin (day 4 total today) to 

complete 7-10 days of therapy; cultures have been negative

## 2018-05-11 NOTE — CP.PCM.PN
<Annelise Spencer - Last Filed: 05/11/18 12:33>





Subjective





- Date & Time of Evaluation


Date of Evaluation: 05/11/18


Time of Evaluation: 12:33





- Subjective


Subjective: 


57 y/o non verbal male seen at bedside this morning with attending Dr. Hobbs 

for chronic lower extremity edema with intermittent ulcerations to B/L lower 

extremities. Pt aphasic but understands when you speak to him. Pt denies any 

events overnight and denies any pain to his LE. Denies F/C/N/V/CP/SOB














Objective





- Vital Signs/Intake and Output


Vital Signs (last 24 hours): 


 











Temp Pulse Resp BP Pulse Ox


 


 98.4 F   66   20   155/82 H  95 


 


 05/11/18 07:00  05/11/18 09:29  05/11/18 07:00  05/11/18 09:29  05/11/18 07:00








Intake and Output: 


 











 05/11/18 05/11/18





 06:59 18:59


 


Intake Total  240


 


Output Total 1550 500


 


Balance -1550 -260














- Medications


Medications: 


 Current Medications





Apixaban (Eliquis)  2.5 mg PO BID [facility]


   PRN Reason: Protocol


   Last Admin: 05/11/18 09:29 Dose:  2.5 mg


Atorvastatin Calcium (Lipitor)  10 mg PO HS [facility]


   Last Admin: 05/10/18 22:25 Dose:  10 mg


Betamethasone/Clotrimazole (Lotrisone)  0 ml TOP BID [facility]


   Last Admin: 05/11/18 09:30 Dose:  1 gm


Diltiazem HCl (Cardizem Cd)  120 mg PO DAILY [facility]


   Last Admin: 05/11/18 09:29 Dose:  120 mg


Furosemide (Lasix)  20 mg PO DAILY [facility]


   Last Admin: 05/11/18 09:29 Dose:  20 mg


Gabapentin (Neurontin)  400 mg PO TID [facility]


   PRN Reason: Protocol


   Last Admin: 05/11/18 09:30 Dose:  400 mg


Glipizide (Glucotrol)  10 mg PO BID [facility]


   Last Admin: 05/11/18 09:29 Dose:  10 mg


Ceftriaxone Sodium (Rocephin 1 Gram Ivpb)  1 gm in 100 mls @ 100 mls/hr IVPB 

DAILY [facility]


   PRN Reason: Protocol


   Last Admin: 05/11/18 10:06 Dose:  100 mls/hr


Insulin Human Regular (Humulin R High)  0 units SC ACHS [facility]


   PRN Reason: Protocol


   Last Admin: 05/11/18 12:11 Dose:  1 units


Linezolid (Zyvox)  600 mg PO BID [facility]


   PRN Reason: Protocol


   Last Admin: 05/11/18 10:06 Dose:  600 mg


Metformin HCl (Glucophage)  1,000 mg PO BID [facility]


   Last Admin: 05/11/18 09:30 Dose:  1,000 mg


Nystatin (Nystop Topical Powder)  0 gm TOP BID [facility]


   Last Admin: 05/11/18 09:30 Dose:  1 gm


Pioglitazone HCl (Actos)  45 mg PO DAILY [facility]


   Last Admin: 05/11/18 09:29 Dose:  45 mg


Ramipril (Altace)  10 mg PO DAILY [facility]


   Last Admin: 05/11/18 09:29 Dose:  10 mg











- Labs


Labs: 


 





 05/09/18 06:30 





 05/09/18 06:30 











- Constitutional


Appears: Well, Non-toxic, No Acute Distress





- Extremities Exam


Additional comments: 





Lower extremity focused exam:


Vasc: DP/PT pulses non palpable secondary to edema. Temperature gradient 

reversed. CFT < 3 sec to all digits. Diffuse non pitting pedal edema noted B/L 

from tibial tuberosity to digits, significantly improving


Derm: Diffuse elephantiasis skin changes noted to dorsum of foot with secondary 

scaling B/L. Healed ulcerative lesion noted to anterior mid right leg with dry 

sanguinous drainage - no purulence, no malodor, no fluctuance. No other open 

lesions or clinical suspicion for infection. Mild calor and rubor noted to 

right thigh. Right thigh folds are macerated with no fissures or breaks in skin.


Neuro: Protective sensation grossly intact


Ortho: No tenderness to posterior calf B/L. No tenderness to palpation of B/L 

lower extremities














- Neurological Exam


Neurological Exam: Alert, Awake, Oriented x3





- Psychiatric Exam


Psychiatric exam: Normal Affect, Normal Mood





Assessment and Plan





- Assessment and Plan (Free Text)


Assessment: 





57 y/o diabetic male with 1) bilateral lower extremity lymphedema and 2) 

cellulitis to right thigh








Plan:


Pt seen and evaluated at bedside with Dr. Hobbs


Labs and vitals reviewed- afebrile, leukocytosis resolving (WBC 11.7 from 24.1)


Continue IV abx per ID 


Lotrisone cream applied to feet and legs bilateral BID


Nystatin powder between folds of legs BID


ACE compression dressings to B/L lower extremities left off at this time due to 

good edema control - leave open to air


Will continue to follow while in house





<Manuel Hobbs - Last Filed: 05/12/18 07:19>





Objective





- Vital Signs/Intake and Output


Vital Signs (last 24 hours): 


 











Temp Pulse Resp BP Pulse Ox


 


 98 F   74   20   128/68   96 


 


 05/11/18 15:24  05/11/18 15:24  05/11/18 15:24  05/11/18 15:24  05/11/18 15:24











- Labs


Labs: 


 





 05/09/18 06:30 





 05/09/18 06:30 











Attending/Attestation





- Attestation


I have personally seen and examined this patient.: Yes


I have fully participated in the care of the patient.: Yes


I have reviewed all pertinent clinical information, including history, physical 

exam and plan: Yes

## 2018-05-11 NOTE — CP.PCM.PN
Subjective





- Date & Time of Evaluation


Date of Evaluation: 05/11/18


Time of Evaluation: 07:30





- Subjective


Subjective: 


Surgery: Marshall





Patient seen and examined at bedside. Per nursing staff, no acute events 

overnight. Patient denies pain in his legs, fever, chills, nausea. 





Objective





- Vital Signs/Intake and Output


Vital Signs (last 24 hours): 


 











Temp Pulse Resp BP Pulse Ox


 


 98.4 F   66   20   155/82 H  95 


 


 05/11/18 07:00  05/11/18 09:29  05/11/18 07:00  05/11/18 09:29  05/11/18 07:00








Intake and Output: 


 











 05/11/18 05/11/18





 06:59 18:59


 


Intake Total  240


 


Output Total 1550 500


 


Balance -1550 -260














- Medications


Medications: 


 Current Medications





Apixaban (Eliquis)  2.5 mg PO BID [facility]


   PRN Reason: Protocol


   Last Admin: 05/11/18 09:29 Dose:  2.5 mg


Atorvastatin Calcium (Lipitor)  10 mg PO HS [facility]


   Last Admin: 05/10/18 22:25 Dose:  10 mg


Betamethasone/Clotrimazole (Lotrisone)  0 ml TOP BID [facility]


   Last Admin: 05/11/18 09:30 Dose:  1 gm


Diltiazem HCl (Cardizem Cd)  120 mg PO DAILY [facility]


   Last Admin: 05/11/18 09:29 Dose:  120 mg


Furosemide (Lasix)  20 mg PO DAILY [facility]


   Last Admin: 05/11/18 09:29 Dose:  20 mg


Gabapentin (Neurontin)  400 mg PO TID [facility]


   PRN Reason: Protocol


   Last Admin: 05/11/18 09:30 Dose:  400 mg


Glipizide (Glucotrol)  10 mg PO BID [facility]


   Last Admin: 05/11/18 09:29 Dose:  10 mg


Ceftriaxone Sodium (Rocephin 1 Gram Ivpb)  1 gm in 100 mls @ 100 mls/hr IVPB 

DAILY [facility]


   PRN Reason: Protocol


   Last Admin: 05/11/18 10:06 Dose:  100 mls/hr


Insulin Human Regular (Humulin R High)  0 units SC ACHS [facility]


   PRN Reason: Protocol


   Last Admin: 05/11/18 08:23 Dose:  4 units


Linezolid (Zyvox)  600 mg PO BID [facility]


   PRN Reason: Protocol


   Last Admin: 05/11/18 10:06 Dose:  600 mg


Metformin HCl (Glucophage)  1,000 mg PO BID [facility]


   Last Admin: 05/11/18 09:30 Dose:  1,000 mg


Nystatin (Nystop Topical Powder)  0 gm TOP BID [facility]


   Last Admin: 05/11/18 09:30 Dose:  1 gm


Pioglitazone HCl (Actos)  45 mg PO DAILY [facility]


   Last Admin: 05/11/18 09:29 Dose:  45 mg


Ramipril (Altace)  10 mg PO DAILY [facility]


   Last Admin: 05/11/18 09:29 Dose:  10 mg











- Labs


Labs: 


 





 05/09/18 06:30 





 05/09/18 06:30 











- Constitutional


Appears: Non-toxic, No Acute Distress, Chronically Ill





- Eye Exam


Eye Exam: Normal appearance





- ENT Exam


ENT Exam: Mucous Membranes Moist





- Respiratory Exam


Respiratory Exam: NORMAL BREATHING PATTERN





- GI/Abdominal Exam


GI & Abdominal Exam: Soft.  absent: Tenderness





- Extremities Exam


Additional comments: 





Bilateral legs with compression ace wraps to knees and abdominal binders on 

bilateral thighs. Upon unwrapping, no active ulceration, bleeding, or drainage.





- Neurological Exam


Neurological Exam: Alert, Awake





Assessment and Plan





- Assessment and Plan (Free Text)


Assessment: 





59 yo M with chronic lower extremity lymphedema and history of chronic 

ulcerations but no active ulcerations at this time; Presenting with sepsis, 

SIRS resolved on IV Abx


Plan: 


- Continue IV antibiotics; follow up ID recs


- Continue compression wraps; abdominal binders to each thigh and ace 

compression wraps from feet, to thighs, overlapping with abdominal binders


- Continue with leg elevation


- No surgical intervention indicated at this time


- Will discuss with Dr. Rolando Martinez PGY1

## 2018-05-12 PROCEDURE — F08Z1ZZ DRESSING TECHNIQUES TREATMENT: ICD-10-PCS | Performed by: INTERNAL MEDICINE

## 2018-05-12 PROCEDURE — F07M6ZZ THERAPEUTIC EXERCISE TREATMENT OF MUSCULOSKELETAL SYSTEM - WHOLE BODY: ICD-10-PCS | Performed by: INTERNAL MEDICINE

## 2018-05-12 PROCEDURE — F08Z2ZZ GROOMING/PERSONAL HYGIENE TREATMENT: ICD-10-PCS | Performed by: INTERNAL MEDICINE

## 2018-05-12 PROCEDURE — F07Z9FZ GAIT TRAINING/FUNCTIONAL AMBULATION TREATMENT USING ASSISTIVE, ADAPTIVE, SUPPORTIVE OR PROTECTIVE EQUIPMENT: ICD-10-PCS | Performed by: INTERNAL MEDICINE

## 2018-05-12 RX ADMIN — NYSTATIN SCH APPLIC: 100000 POWDER TOPICAL at 09:54

## 2018-05-12 RX ADMIN — INSULIN HUMAN SCH: 100 INJECTION, SOLUTION PARENTERAL at 21:48

## 2018-05-12 RX ADMIN — CEFTRIAXONE SCH MLS/HR: 1 INJECTION, SOLUTION INTRAVENOUS at 05:46

## 2018-05-12 RX ADMIN — INSULIN HUMAN SCH: 100 INJECTION, SOLUTION PARENTERAL at 16:54

## 2018-05-12 RX ADMIN — INSULIN HUMAN SCH UNITS: 100 INJECTION, SOLUTION PARENTERAL at 11:26

## 2018-05-12 RX ADMIN — NYSTATIN SCH APPLIC: 100000 POWDER TOPICAL at 17:07

## 2018-05-12 RX ADMIN — DILTIAZEM HYDROCHLORIDE SCH MG: 120 CAPSULE, COATED, EXTENDED RELEASE ORAL at 09:54

## 2018-05-12 RX ADMIN — CLOTRIMAZOLE AND BETAMETHASONE DIPROPIONATE SCH APPLIC: 10; .5 CREAM TOPICAL at 09:55

## 2018-05-12 RX ADMIN — INSULIN HUMAN SCH UNITS: 100 INJECTION, SOLUTION PARENTERAL at 07:41

## 2018-05-12 RX ADMIN — CLOTRIMAZOLE AND BETAMETHASONE DIPROPIONATE SCH APPLIC: 10; .5 CREAM TOPICAL at 17:07

## 2018-05-12 NOTE — DS
HISTORY OF PRESENT ILLNESS:  This is a 58-year-old male who had come in to

the hospital.  He was found to be septic.  The patient has a long history

of chronic skin infections from his lymphedema in the leg.  He was started

on Teflaro for antibiotics.  He has cultures that have been negative.  The

patient was seen by physical therapy and it was recommended that the

patient may go to transitional care unit for rehab.  He is going to be

evaluated by TCU and discharged if he is accepted.



PHYSICAL EXAMINATION:

VITAL SIGNS:  Temperature is 99.4, pulse is 71, blood pressure is 122/62,

respirations 19.

GENERAL:  The patient is lying in bed, flat, comfortable.

HEENT:  No oral lesion.  Anicteric sclerae.  Moist mucosa.

NECK:  No JVD, adenopathy, or thyromegaly.

CARDIOVASCULAR:  S1 and S2, regular.  No murmurs, rubs, or gallops.

LUNGS:  Clear to auscultation bilaterally.  No wheeze, rales, or rhonchi.

ABDOMEN:  Bowel sounds are positive, soft, nontender and nondistended.

EXTREMITIES:  No cyanosis, clubbing or edema.



ASSESSMENT:

1.  Sepsis.

2.  Nephrolithiasis.

3.  Lymphedema of the legs.

4.  Obesity with a body mass index of 40.

5.  Atrial fibrillation, on Eliquis.

6.  Hypertension.

7.  Dyslipidemia.

8.  Diabetes type 2.



PLAN:  The patient is currently comfortable.  He is on his Actos for his

diabetes.  His sugars have been better controlled.  He is on Altace for his

hypertension.  The patient is going to continue with glipizide for his

diabetes.  He is on Eliquis and Cardizem for his atrial fibrillation.  The

patient is on Lipitor for dyslipidemia.  He is on IV fluids.  I will

discontinue his IV fluids at this point.  I had held his metformin.  I will

continue his metformin this morning as he is not _____ for any contrast.  I

had initially placed him on insulin because his sugars were significantly

elevated due to his sepsis.  I will discontinue his insulin because he has

had a hard time in using insulin at home.





__________________________________________

Delbert Corona MD



DD:  05/11/2018 6:11:55

DT:  05/11/2018 7:25:36

Job # [id number]

## 2018-05-12 NOTE — CP.PCM.CON
History of Present Illness





- History of Present Illness


History of Present Illness: 


58 year old male with PMH of ight leg skin/skin structure infection with 

chronic leg ulcers, growing MRSA and sensitive Klebsiella in 2016, Bilateral 

lower extremity skin and skin structure infection (Enterobacter, Klebsiella 

Oxytoca and Group B Strep, as well as MRSA) which was treated 9/2015; MRSA and 

Pseudomonas cellulitis of left leg in Dec 2015, May and June 2016, and MRSA 

cellulitis in Sept. 2016, chronic lymphedema of the lower extremities, Morbid 

obesity with BMI 35, HTN, DM, history of Strep bacteremia, history of hernia 

surgery, Learning disability was initially admitted for fevers and right leg 

cellulitis. He has been on antibiotics and local wound care and has done well 

and is now transferred to [facility] for continued medical therapy and physical 

rehab. Infectious Diseases consult is requested to continue his antibiotic 

therapy. Patient has no fever, no diarrhea, no vomiting.





Review of Systems





- Review of Systems


Systems not reviewed;Unavailable: Other (learning disability)





Past Patient History





- Infectious Disease


Hx of Infectious Diseases: None





- Tetanus Immunizations


Tetanus Immunization: Unknown





- Past Medical History & Family History


Past Medical History?: Yes





- Past Social History


Smoking Status: Former Smoker





- CARDIAC


Hx Cardiac Disorders: Yes


Hx Congestive Heart Failure: Yes


Hx Hypertension: Yes





- PULMONARY


Hx Respiratory Disorders: No





- NEUROLOGICAL


Hx Neurological Disorder: Yes


Hx Seizures: Yes


Other/Comment: Epilepsy





- HEENT


Hx HEENT Problems: Yes


Other/Comment: wears glasses





- RENAL


Hx Chronic Kidney Disease: No





- ENDOCRINE/METABOLIC


Hx Diabetes Mellitus Type 2: Yes





- HEMATOLOGICAL/ONCOLOGICAL


Hx Blood Disorders: No


Other/Comment: blood infection





- INTEGUMENTARY


Hx Dermatological Problems: Yes


Other/Comment: multiple wounds on the rt leg, cellulitis in b/l extreminity. 

Left thigh cellulitis





- MUSCULOSKELETAL/RHEUMATOLOGICAL


Hx Musculoskeletal Disorders: Yes


Hx Falls: Yes





- GASTROINTESTINAL


Hx Gastrointestinal Disorders: Yes





- GENITOURINARY/GYNECOLOGICAL


Hx Genitourinary Disorders: No





- PSYCHIATRIC


Hx Psychophysiologic Disorder: Yes


Hx Substance Use: No


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





- SURGICAL HISTORY


Hx Amputation: No


Hx Appendectomy: No


Hx Cholecystectomy: No


Hx Gastric Bypass Surgery: No


Hx Hysterectomy: No


Hx Joint Replacement: No


Hx Kidney Transplant: No


Hx Liver Transplant: No


Hx Mastectomy: No


Hx Musculoskeletal Surgery: No


Hx Open Heart Surgery: No


Hx Orthopedic Surgery: No


Hx Splenectomy: No


Hx Valve Replacement: No





- ANESTHESIA


Hx Anesthesia: Yes


Hx Anesthesia Reactions: No


Hx Malignant Hyperthermia: No





Meds


Allergies/Adverse Reactions: 


 Allergies











Allergy/AdvReac Type Severity Reaction Status Date / Time


 


No Known Allergies Allergy   Verified 05/11/18 17:34














- Medications


Medications: 


 Current Medications





Apixaban (Eliquis)  2.5 mg PO BID [facility]


   PRN Reason: Protocol


   Last Admin: 05/11/18 17:55 Dose:  2.5 mg


Atorvastatin Calcium (Lipitor)  10 mg PO HS [facility]


   Last Admin: 05/11/18 21:50 Dose:  10 mg


Betamethasone/Clotrimazole (Lotrisone)  1 gm TOP BID [facility]


   Last Admin: 05/11/18 17:56 Dose:  1 applic


Diltiazem HCl (Cardizem Cd)  120 mg PO DAILY [facility]


Furosemide (Lasix)  20 mg PO DAILY [facility]


Gabapentin (Neurontin)  400 mg PO TID [facility]


   PRN Reason: Protocol


   Last Admin: 05/11/18 17:56 Dose:  400 mg


Glipizide (Glucotrol)  10 mg PO BID [facility]


   Last Admin: 05/11/18 17:55 Dose:  10 mg


Ceftriaxone Sodium (Rocephin 1 Gram Ivpb)  1 gm in 100 mls @ 100 mls/hr IVPB 

0630 [facility]


   PRN Reason: Protocol


Insulin Human Regular (Humulin R High)  0 units SC ACHS [facility]


   PRN Reason: Protocol


   Last Admin: 05/11/18 21:50 Dose:  Not Given


Linezolid (Zyvox)  600 mg PO BID [facility]


   PRN Reason: Protocol


   Last Admin: 05/11/18 17:57 Dose:  600 mg


Metformin HCl (Glucophage)  1,000 mg PO BID [facility]


   Last Admin: 05/11/18 17:55 Dose:  1,000 mg


Nystatin (Nystop Topical Powder)  1 gm TOP BID [facility]


   Last Admin: 05/11/18 17:56 Dose:  1 applic


Pioglitazone HCl (Actos)  45 mg PO DAILY [facility]


Ramipril (Altace)  10 mg PO DAILY [facility]











Physical Exam





- Constitutional


Appears: Non-toxic, Chronically Ill





- Head Exam


Head Exam: NORMAL INSPECTION





- ENT Exam


ENT Exam: Mucous Membranes Moist





- Neck Exam


Neck exam: Negative for: Lymphadenopathy, Meningismus





- Respiratory Exam


Respiratory Exam: Decreased Breath Sounds.  absent: Rales





- Cardiovascular Exam


Cardiovascular Exam: +S1, +S2





- GI/Abdominal Exam


GI & Abdominal Exam: Soft.  absent: Tenderness





- Extremities Exam


Additional comments: 





both legs with dressings in place





Results





- Vital Signs


Recent Vital Signs: 


 Last Vital Signs











Temp  98.6 F   05/11/18 16:00


 


Pulse  63   05/11/18 16:00


 


Resp  20   05/11/18 16:00


 


BP  115/69   05/11/18 16:00


 


Pulse Ox  96   05/11/18 16:00














- Labs


Labs: 


 Laboratory Results - last 24 hr











  05/11/18





  21:47


 


POC Glucose (mg/dL)  192 H














Assessment & Plan





- Assessment and Plan (Free Text)


Plan: 





Assessment


consider sepsis due to right leg skin and skin structure infection associated 

with chronic leg ulcers, slowly improving


history of MRSA and Klebsiella oxytoca right leg cellulitis


history of sepsis secondary to right leg skin/skin structure infection with 

chronic leg ulcers, growing MRSA and sensitive Klebsiella 


history of MRSA cellulitis Sept. 2016


history of Bilateral lower extremity skin and skin structure infection (

Enterobacter, Klebsiella Oxytoca and Group B Strep, as well as MRSA) which was 

treated 9/2015; MRSA and Pseudomonas cellulitis of left leg in Dec 2015, May 

and June 2016


chronic lymphedema of the lower extremities


Morbid obesity with BMI 35


HTN


DM


history of Strep bacteremia


history of hernia surgery


Learning disability





Plan


continue Zyvox and Rocephin (day 5 total today) to complete 7-10 days of therapy

; cultures have been negative


will monitor clinically

## 2018-05-12 NOTE — HP
HISTORY OF PRESENT ILLNESS:  The patient is 58 years old, mentally

challenged, has multiple admission because of bilateral leg cellulitis,

lymphedema, elephantiasis.  The patient has multiple admissions with

similar episode, so he came to emergency room because of the fever and

increasing leg swelling.  He has been getting IV antibiotic and local wound

treatment, transferred to TCU to continue antibiotic and wound care and

physical therapy.



PAST MEDICAL HISTORY:  He has past medical history significant for :

1.  Moderate obesity.

2.  Mentally challenged.

3.  Chronic venous stasis with recurrent bilateral leg cellulitis.

4.  Hypertension.

5.  Congestive heart failure.

6.  Non-insulin dependent diabetes.



ALLERGIES:  HE IS NOT ALLERGIC TO ANY MEDICATIONS.



MEDICATION AT HOME:  He is on Humalog, ramipril 10 mg daily, potassium

supplementation.  He is on Actos, omeprazole 20 mg daily, metformin,

glipizide 10 mg twice a day, Neurontin 400 three times a day, Lasix 20 mg

daily, diltiazem 120 daily, atorvastatin 10 mg daily, and Eliquis.



SOCIAL HISTORY:  History of smoker in the past, quit few years ago.  Does

not drink.



PHYSICAL EXAMINATION:

GENERAL:  He is lying in bed, seems to be comfortable, both legs are

wrapped in the Ace bandage.

VITAL SIGNS:  He is afebrile, pulse 70, respiration 20, blood pressure

141/81.

LUNGS:  Bilateral fair airflow.  No rhonchi or crackle.

HEART:  S1 and S2 audible.

ABDOMEN:  Soft, obese, nontender.  No rebound.  No guarding.

NEUROLOGICAL:  He is awake, alert, oriented, communicative.



LABORATORY DATA:  Blood sugar is 136.



ASSESSMENT AND PLAN:

1.  Bilateral leg cellulitis.

2.  Moderate obesity.

3.  Non-insulin dependent diabetes.

4.  Hypertension.

5.  Chronic stasis dermatitis.



PLAN:  The patient is currently on Actos, metformin and glipizide.  Blood

sugar seems to be under decent control.  Continue him on diltiazem and

ramipril for control of blood pressure and heart rate.  He is on Eliquis

2.5 twice a day and he is getting Lasix intermittently.  Local wound care

is done by the Podiatry team and he is also getting Rocephin.









__________________________________________

Yolanda Conroy MD







DD:  05/12/2018 19:18:54

DT:  05/12/2018 21:17:06

[facility] # [id number]

## 2018-05-12 NOTE — CP.PCM.CON
<Joey Branham - Last Filed: 05/12/18 09:32>





History of Present Illness





- History of Present Illness


History of Present Illness: 





Podiatry Consult Note- Dr. Hobbs





57 y/o non verbal male seen at bedside this morning with attending Dr. Hobbs 

for chronic lower extremity edema with intermittent ulcerations to B/L lower 

extremities. Seen and evaluated patient while on floors prior to transfer. Pt 

aphasic but understands when you speak to him. Pt denies any events overnight 

and denies any pain to his LE. Denies F/C/N/V/CP/SOB.





Past Patient History





- Infectious Disease


Hx of Infectious Diseases: None





- Tetanus Immunizations


Tetanus Immunization: Unknown





- Past Medical History & Family History


Past Medical History?: Yes





- Past Social History


Smoking Status: Former Smoker





- CARDIAC


Hx Cardiac Disorders: Yes


Hx Congestive Heart Failure: Yes


Hx Hypertension: Yes





- PULMONARY


Hx Respiratory Disorders: No





- NEUROLOGICAL


Hx Neurological Disorder: Yes


Hx Seizures: Yes


Other/Comment: Epilepsy





- HEENT


Hx HEENT Problems: Yes


Other/Comment: wears glasses





- RENAL


Hx Chronic Kidney Disease: No





- ENDOCRINE/METABOLIC


Hx Diabetes Mellitus Type 2: Yes





- HEMATOLOGICAL/ONCOLOGICAL


Hx Blood Disorders: No


Other/Comment: blood infection





- INTEGUMENTARY


Hx Dermatological Problems: Yes


Other/Comment: multiple wounds on the rt leg, cellulitis in b/l extreminity. 

Left thigh cellulitis





- MUSCULOSKELETAL/RHEUMATOLOGICAL


Hx Falls: Yes





- GASTROINTESTINAL


Hx Gastrointestinal Disorders: Yes (GERDINGUINAL HERNIA REPAIR,)





- GENITOURINARY/GYNECOLOGICAL


Hx Genitourinary Disorders: Yes (PYELONEPHRITIS)


Hx Reproductive Disorders: No





- PSYCHIATRIC


Hx Psychophysiologic Disorder: Yes


Hx Substance Use: No


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





- SURGICAL HISTORY


Hx Amputation: No


Hx Appendectomy: No


Hx Cholecystectomy: No


Hx Gastric Bypass Surgery: No


Hx Hysterectomy: No


Hx Joint Replacement: No


Hx Kidney Transplant: No


Hx Liver Transplant: No


Hx Mastectomy: No


Hx Musculoskeletal Surgery: No


Hx Open Heart Surgery: No


Hx Orthopedic Surgery: No


Hx Splenectomy: No


Hx Valve Replacement: No





- ANESTHESIA


Hx Anesthesia: Yes


Hx Anesthesia Reactions: No


Hx Malignant Hyperthermia: No





Meds


Allergies/Adverse Reactions: 


 Allergies











Allergy/AdvReac Type Severity Reaction Status Date / Time


 


No Known Allergies Allergy   Verified 05/11/18 17:34














- Medications


Medications: 


 Current Medications





Apixaban (Eliquis)  2.5 mg PO BID [facility]


   PRN Reason: Protocol


   Last Admin: 05/11/18 17:55 Dose:  2.5 mg


Atorvastatin Calcium (Lipitor)  10 mg PO HS [facility]


   Last Admin: 05/11/18 21:50 Dose:  10 mg


Betamethasone/Clotrimazole (Lotrisone)  1 gm TOP BID [facility]


   Last Admin: 05/11/18 17:56 Dose:  1 applic


Diltiazem HCl (Cardizem Cd)  120 mg PO DAILY [facility]


Furosemide (Lasix)  20 mg PO DAILY [facility]


Gabapentin (Neurontin)  400 mg PO TID [facility]


   PRN Reason: Protocol


   Last Admin: 05/11/18 17:56 Dose:  400 mg


Glipizide (Glucotrol)  10 mg PO BID [facility]


   Last Admin: 05/11/18 17:55 Dose:  10 mg


Ceftriaxone Sodium (Rocephin 1 Gram Ivpb)  1 gm in 100 mls @ 100 mls/hr IVPB 

0630 [facility]


   PRN Reason: Protocol


   Last Admin: 05/12/18 05:46 Dose:  100 mls/hr


Insulin Human Regular (Humulin R High)  0 units SC ACHS [facility]


   PRN Reason: Protocol


   Last Admin: 05/12/18 07:41 Dose:  2 units


Linezolid (Zyvox)  600 mg PO BID [facility]


   PRN Reason: Protocol


   Last Admin: 05/11/18 17:57 Dose:  600 mg


Metformin HCl (Glucophage)  1,000 mg PO BID [facility]


   Last Admin: 05/11/18 17:55 Dose:  1,000 mg


Nystatin (Nystop Topical Powder)  1 gm TOP BID [facility]


   Last Admin: 05/11/18 17:56 Dose:  1 applic


Pioglitazone HCl (Actos)  45 mg PO DAILY [facility]


Ramipril (Altace)  10 mg PO DAILY [facility]











Physical Exam





- Constitutional


Appears: Well, Non-toxic, No Acute Distress





- Extremities Exam


Extremities exam: Negative for: calf tenderness


Additional comments: 





Dressing c/d/i without strikethrough


Dressing changed today


No calf pain or tenderness, no pain with palpation to the entire LE


Diffuse elephantiasis skin changes noted to dorsum of foot with secondary 

scaling B/L.





Results





- Vital Signs


Recent Vital Signs: 


 Last Vital Signs











Temp  98.6 F   05/12/18 00:44


 


Pulse  63   05/12/18 00:44


 


Resp  20   05/12/18 00:44


 


BP  115/69   05/12/18 00:44


 


Pulse Ox  96   05/11/18 16:00














- Labs


Labs: 


 Laboratory Results - last 24 hr











  05/11/18 05/12/18





  21:47 05:52


 


POC Glucose (mg/dL)  192 H  188 H














Assessment & Plan





- Assessment and Plan (Free Text)


Assessment: 





57 y/o diabetic male with 1) bilateral lower extremity lymphedema and 2) 

cellulitis to right thigh





Plan: 





Pt seen and evaluated at bedside with Dr. Hobbs


Labs and vitals reviewed- afebrile, leukocytosis resolving (WBC 11.7 from 24.1)


Continue IV abx per ID 


Lotrisone cream to be applied to feet and legs bilateral BID


Nystatin powder between folds of legs BID


ACE compression 


Will continue to follow while in house





<Manuel Hobbs - Last Filed: 05/15/18 11:54>





Meds





- Medications


Medications: 


 Current Medications





Apixaban (Eliquis)  2.5 mg PO BID [facility]


   PRN Reason: Protocol


   Last Admin: 05/15/18 11:46 Dose:  2.5 mg


Atorvastatin Calcium (Lipitor)  10 mg PO HS [facility]


   Last Admin: 05/14/18 21:28 Dose:  10 mg


Betamethasone/Clotrimazole (Lotrisone)  1 gm TOP BID [facility]


   Last Admin: 05/14/18 17:08 Dose:  1 applic


Diltiazem HCl (Cardizem Cd)  120 mg PO DAILY [facility]


   Last Admin: 05/15/18 11:45 Dose:  120 mg


Furosemide (Lasix)  20 mg PO DAILY [facility]


   Last Admin: 05/15/18 11:47 Dose:  20 mg


Gabapentin (Neurontin)  400 mg PO TID [facility]


   PRN Reason: Protocol


   Last Admin: 05/15/18 11:48 Dose:  400 mg


Glipizide (Glucotrol)  10 mg PO BID [facility]


   Last Admin: 05/15/18 11:47 Dose:  10 mg


Ceftriaxone Sodium (Rocephin 1 Gram Ivpb)  1 gm in 100 mls @ 100 mls/hr IVPB 

DAILY [facility]


   PRN Reason: Protocol


Insulin Human Regular (Humulin R High)  0 units SC ACHS [facility]


   PRN Reason: Protocol


   Last Admin: 05/15/18 07:07 Dose:  Not Given


Lactic Acid (Lac-Hydrin 12% Cream (140 G))  0 ea TOP DAILY [facility]


Linezolid (Zyvox)  600 mg PO BID [facility]


   PRN Reason: Protocol


   Last Admin: 05/15/18 11:48 Dose:  600 mg


Metformin HCl (Glucophage)  1,000 mg PO BID [facility]


   Last Admin: 05/15/18 11:46 Dose:  1,000 mg


Nystatin (Nystop Topical Powder)  1 gm TOP BID [facility]


   Last Admin: 05/15/18 11:48 Dose:  1 applic


Pioglitazone HCl (Actos)  45 mg PO DAILY [facility]


   Last Admin: 05/15/18 11:45 Dose:  45 mg


Ramipril (Altace)  10 mg PO DAILY [facility]


   Last Admin: 05/15/18 11:45 Dose:  10 mg











Results





- Vital Signs


Recent Vital Signs: 


 Last Vital Signs











Temp  98.8 F   05/14/18 16:00


 


Pulse  63   05/15/18 11:45


 


Resp  20   05/14/18 16:00


 


BP  147/73   05/15/18 11:47


 


Pulse Ox  95   05/14/18 16:00














- Labs


Labs: 


 Laboratory Results - last 24 hr











  05/14/18 05/14/18 05/15/18





  16:32 21:35 05:49


 


POC Glucose (mg/dL)  159 H  134 H  150 H














Attending/Attestation





- Attestation


I have personally seen and examined this patient.: Yes


I have fully participated in the care of the patient.: Yes


I have reviewed all pertinent clinical information: Yes

## 2018-05-13 RX ADMIN — NYSTATIN SCH APPLIC: 100000 POWDER TOPICAL at 17:32

## 2018-05-13 RX ADMIN — CLOTRIMAZOLE AND BETAMETHASONE DIPROPIONATE SCH APPLIC: 10; .5 CREAM TOPICAL at 10:40

## 2018-05-13 RX ADMIN — CLOTRIMAZOLE AND BETAMETHASONE DIPROPIONATE SCH APPLIC: 10; .5 CREAM TOPICAL at 17:32

## 2018-05-13 RX ADMIN — INSULIN HUMAN SCH UNITS: 100 INJECTION, SOLUTION PARENTERAL at 12:37

## 2018-05-13 RX ADMIN — INSULIN HUMAN SCH: 100 INJECTION, SOLUTION PARENTERAL at 07:38

## 2018-05-13 RX ADMIN — INSULIN HUMAN SCH: 100 INJECTION, SOLUTION PARENTERAL at 22:28

## 2018-05-13 RX ADMIN — INSULIN HUMAN SCH: 100 INJECTION, SOLUTION PARENTERAL at 17:30

## 2018-05-13 RX ADMIN — CEFTRIAXONE SCH MLS/HR: 1 INJECTION, SOLUTION INTRAVENOUS at 05:34

## 2018-05-13 RX ADMIN — NYSTATIN SCH APPLIC: 100000 POWDER TOPICAL at 10:40

## 2018-05-13 RX ADMIN — DILTIAZEM HYDROCHLORIDE SCH MG: 120 CAPSULE, COATED, EXTENDED RELEASE ORAL at 10:41

## 2018-05-13 NOTE — CP.PCM.PN
Subjective





- Date & Time of Evaluation


Date of Evaluation: 05/13/18


Time of Evaluation: 10:15





- Subjective


Subjective: 





Resting comfortably on a chair, no fevers, not in distress. No diarrhea.





Objective





- Vital Signs/Intake and Output


Vital Signs (last 24 hours): 


 











Temp Pulse Resp BP Pulse Ox


 


 98.4 F   70   20   141/81   96 


 


 05/12/18 17:08  05/12/18 17:08  05/12/18 17:08  05/12/18 17:08  05/12/18 17:08








Intake and Output: 


 











 05/12/18 05/13/18





 18:59 06:59


 


Output Total 900 


 


Balance -900 














- Medications


Medications: 


 Current Medications





Apixaban (Eliquis)  2.5 mg PO BID [facility]


   PRN Reason: Protocol


   Last Admin: 05/12/18 17:07 Dose:  2.5 mg


Atorvastatin Calcium (Lipitor)  10 mg PO HS [facility]


   Last Admin: 05/12/18 21:49 Dose:  10 mg


Betamethasone/Clotrimazole (Lotrisone)  1 gm TOP BID [facility]


   Last Admin: 05/12/18 17:07 Dose:  1 applic


Diltiazem HCl (Cardizem Cd)  120 mg PO DAILY [facility]


   Last Admin: 05/12/18 09:54 Dose:  120 mg


Furosemide (Lasix)  20 mg PO DAILY [facility]


   Last Admin: 05/12/18 09:54 Dose:  20 mg


Gabapentin (Neurontin)  400 mg PO TID [facility]


   PRN Reason: Protocol


   Last Admin: 05/12/18 17:06 Dose:  400 mg


Glipizide (Glucotrol)  10 mg PO BID [facility]


   Last Admin: 05/12/18 17:07 Dose:  10 mg


Ceftriaxone Sodium (Rocephin 1 Gram Ivpb)  1 gm in 100 mls @ 100 mls/hr IVPB 

0630 [facility]


   PRN Reason: Protocol


   Last Admin: 05/13/18 05:34 Dose:  100 mls/hr


Insulin Human Regular (Humulin R High)  0 units SC ACHS [facility]


   PRN Reason: Protocol


   Last Admin: 05/12/18 21:48 Dose:  Not Given


Metformin HCl (Glucophage)  1,000 mg PO BID [facility]


   Last Admin: 05/12/18 17:07 Dose:  1,000 mg


Nystatin (Nystop Topical Powder)  1 gm TOP BID [facility]


   Last Admin: 05/12/18 17:07 Dose:  1 applic


Pioglitazone HCl (Actos)  45 mg PO DAILY [facility]


   Last Admin: 05/12/18 09:53 Dose:  45 mg


Ramipril (Altace)  10 mg PO DAILY [facility]


   Last Admin: 05/12/18 09:54 Dose:  10 mg











- Constitutional


Appears: Non-toxic, Chronically Ill





- Head Exam


Head Exam: NORMAL INSPECTION





- Respiratory Exam


Respiratory Exam: Decreased Breath Sounds





- Cardiovascular Exam


Cardiovascular Exam: +S1, +S2





- GI/Abdominal Exam


GI & Abdominal Exam: Soft.  absent: Tenderness





- Extremities Exam


Additional comments: 





both legs with dressings and bandages in place





Assessment and Plan





- Assessment and Plan (Free Text)


Plan: 





Assessment


consider sepsis due to right leg skin and skin structure infection associated 

with chronic leg ulcers, slowly improving


history of MRSA and Klebsiella oxytoca right leg cellulitis


history of sepsis secondary to right leg skin/skin structure infection with 

chronic leg ulcers, growing MRSA and sensitive Klebsiella 


history of MRSA cellulitis Sept. 2016


history of Bilateral lower extremity skin and skin structure infection (

Enterobacter, Klebsiella Oxytoca and Group B Strep, as well as MRSA) which was 

treated 9/2015; MRSA and Pseudomonas cellulitis of left leg in Dec 2015, May 

and June 2016


chronic lymphedema of the lower extremities


Morbid obesity with BMI 35


HTN


DM


history of Strep bacteremia


history of hernia surgery


Learning disability





Plan


continue Zyvox and Rocephin (day 6 total today) to complete 7-10 days of therapy

; cultures have been negative


will continue to monitor clinically

## 2018-05-13 NOTE — CP.PCM.PN
Subjective





- Date & Time of Evaluation


Date of Evaluation: 05/13/18


Time of Evaluation: 12:13





- Subjective


Subjective: 





Podiatry Progress Note- Dr. Ramirez





59 y/o non verbal male seen and evaluated for lower extremity lymphedema and 

cellulitis- improving. Patient is seen resting comfortably out of bed, in 

chair. Dressing is clean, dry, and intact without strikethrough. Patient just 

returned from physical therapy. Reports feeling okay. Patient denies any events 

overnight and denies any pain to his LE. Denies F/C/N/V/CP/SOB. No new 

complaints. 








Objective





- Vital Signs/Intake and Output


Vital Signs (last 24 hours): 


 











Temp Pulse Resp BP Pulse Ox


 


 98.4 F   70   20   130/79   96 


 


 05/12/18 17:08  05/12/18 17:08  05/12/18 17:08  05/13/18 10:39  05/12/18 17:08











- Medications


Medications: 


 Current Medications





Apixaban (Eliquis)  2.5 mg PO BID [facility]


   PRN Reason: Protocol


   Last Admin: 05/13/18 10:41 Dose:  2.5 mg


Atorvastatin Calcium (Lipitor)  10 mg PO HS [facility]


   Last Admin: 05/12/18 21:49 Dose:  10 mg


Betamethasone/Clotrimazole (Lotrisone)  1 gm TOP BID [facility]


   Last Admin: 05/13/18 10:40 Dose:  1 applic


Diltiazem HCl (Cardizem Cd)  120 mg PO DAILY [facility]


   Last Admin: 05/13/18 10:41 Dose:  120 mg


Furosemide (Lasix)  20 mg PO DAILY [facility]


   Last Admin: 05/13/18 10:39 Dose:  20 mg


Gabapentin (Neurontin)  400 mg PO TID [facility]


   PRN Reason: Protocol


   Last Admin: 05/13/18 10:40 Dose:  400 mg


Glipizide (Glucotrol)  10 mg PO BID [facility]


   Last Admin: 05/13/18 10:41 Dose:  10 mg


Ceftriaxone Sodium (Rocephin 1 Gram Ivpb)  1 gm in 100 mls @ 100 mls/hr IVPB 

0630 [facility]


   PRN Reason: Protocol


   Last Admin: 05/13/18 05:34 Dose:  100 mls/hr


Insulin Human Regular (Humulin R High)  0 units SC ACHS [facility]


   PRN Reason: Protocol


   Last Admin: 05/13/18 07:38 Dose:  Not Given


Metformin HCl (Glucophage)  1,000 mg PO BID [facility]


   Last Admin: 05/13/18 10:41 Dose:  1,000 mg


Nystatin (Nystop Topical Powder)  1 gm TOP BID [facility]


   Last Admin: 05/13/18 10:40 Dose:  1 applic


Pioglitazone HCl (Actos)  45 mg PO DAILY [facility]


   Last Admin: 05/13/18 10:41 Dose:  45 mg


Ramipril (Altace)  10 mg PO DAILY [facility]


   Last Admin: 05/13/18 10:41 Dose:  10 mg











- Constitutional


Appears: Well, Non-toxic, No Acute Distress





- Extremities Exam


Extremities Exam: absent: Calf Tenderness


Additional comments: 





Lower extremity focused exam:


Vasc: DP/PT pulses non palpable secondary to edema. Temperature gradient 

reversed. CFT < 3 sec to all digits. Diffuse non pitting pedal edema noted B/L 

from tibial tuberosity to digits


Derm: Diffuse elephantiasis skin changes noted to dorsum of foot with secondary 

scaling B/L. Healed ulcerative lesion noted to anterior mid right leg with dry 

sanguinous drainage - no purulence, no malodor, no fluctuance. No other open 

lesions or clinical suspicion for infection. Mild calor and rubor noted to 

right thigh. Right thigh folds are macerated with no fissures or breaks in skin.


Neuro: Protective sensation grossly intact


Ortho: No tenderness to posterior calf B/L. No tenderness to palpation of B/L 

lower extremities








- Neurological Exam


Neurological Exam: Alert, Awake





Assessment and Plan





- Assessment and Plan (Free Text)


Assessment: 





59 y/o diabetic male with 1) bilateral lower extremity lymphedema and 2) 

cellulitis to right thigh





Plan: 





Pt seen and evaluated at bedside


Labs and vitals reviewed- afebrile, leukocytosis resolving\


Continue IV abx per ID 


Lotrisone cream to feet and legs bilateral BID


Nystatin powder between folds of legs BID


ACE compression dressings to B/L lower extremities


Will continue to follow while in house

## 2018-05-13 NOTE — PN
DATE:  05/13/2018



SUBJECTIVE:  The patient is 58 years old, seen and examined, sitting in

chair, seems to be comfortable.  Offers no complaint.  The surgical team

just had his leg dressing changed.  No active ulcer; however, he has

chronic stasis dermatitis.



PHYSICAL EXAMINATION:

VITAL SIGNS:  He is afebrile, pulse 66, respirations 16, blood pressure

130/79.

LUNGS:  Bilateral fair airflow.  No rhonchi or crackle.

HEART:  S1 and S2 audible.

ABDOMEN:  Soft, obese, nontender.  No rebound.  No guarding.

NEUROLOGIC:  He is awake, alert, oriented.

EXTREMITIES: Bilateral leg in dressing.



LABORATORY EXAM:  Blood sugar is 132.



ASSESSMENT:

1.  Morbid obesity.

2.  Chronic stasis dermatitis.

3.  Non-insulin-dependent diabetes.

4.  History of hypertension.

5.  Deconditioning and difficulty walking.

6.  Mentally challenged.

7.  History of congestive heart failure.

8.  Bilateral leg cellulitis, worsening.



PLAN:  We will continue the patient on current medications.  Local wound

care is being done.  Currently, he is on Rocephin.  Surgical team is taking

care of the patient's bilateral leg wounds.





__________________________________________

Yolanda Conroy MD





DD:  05/13/2018 17:21:02

DT:  05/13/2018 17:35:22

[facility] # [id number]

## 2018-05-13 NOTE — PN
DATE:  05/13/2018



SUBJECTIVE:  Jayme Cotton is being followed in the TCU.  The compression

stockings on the legs are doing well.  I will follow peripherally and

periodically.  Please recall as necessary.





__________________________________________

Amor Marshall MD







DD:  05/13/2018 11:27:05

DT:  05/13/2018 11:42:45

[facility] # [id number]

## 2018-05-14 RX ADMIN — CLOTRIMAZOLE AND BETAMETHASONE DIPROPIONATE SCH APPLIC: 10; .5 CREAM TOPICAL at 17:08

## 2018-05-14 RX ADMIN — NYSTATIN SCH APPLIC: 100000 POWDER TOPICAL at 17:07

## 2018-05-14 RX ADMIN — INSULIN HUMAN SCH UNITS: 100 INJECTION, SOLUTION PARENTERAL at 17:08

## 2018-05-14 RX ADMIN — DILTIAZEM HYDROCHLORIDE SCH MG: 120 CAPSULE, COATED, EXTENDED RELEASE ORAL at 10:23

## 2018-05-14 RX ADMIN — CLOTRIMAZOLE AND BETAMETHASONE DIPROPIONATE SCH APPLIC: 10; .5 CREAM TOPICAL at 10:24

## 2018-05-14 RX ADMIN — INSULIN HUMAN SCH UNITS: 100 INJECTION, SOLUTION PARENTERAL at 12:33

## 2018-05-14 RX ADMIN — NYSTATIN SCH APPLIC: 100000 POWDER TOPICAL at 10:22

## 2018-05-14 RX ADMIN — INSULIN HUMAN SCH: 100 INJECTION, SOLUTION PARENTERAL at 21:37

## 2018-05-14 RX ADMIN — CEFTRIAXONE SCH MLS/HR: 1 INJECTION, SOLUTION INTRAVENOUS at 06:09

## 2018-05-14 RX ADMIN — INSULIN HUMAN SCH UNITS: 100 INJECTION, SOLUTION PARENTERAL at 07:31

## 2018-05-14 NOTE — CP.PCM.PN
Subjective





- Date & Time of Evaluation


Date of Evaluation: 05/14/18


Time of Evaluation: 08:09





- Subjective


Subjective: 





Surgery Progress note. Dr. Marshall





Pt seen and examined at bedside. Bilateral lower extermities with improving 

swelling. Denies fevers or chills. No new complaints. 





Objective





- Vital Signs/Intake and Output


Vital Signs (last 24 hours): 


 











Temp Pulse Resp BP Pulse Ox


 


 98.5 F   66   16   149/80   97 


 


 05/14/18 06:00  05/14/18 06:00  05/14/18 06:00  05/14/18 06:00  05/14/18 06:00








Intake and Output: 


 











 05/14/18 05/14/18





 06:59 18:59


 


Intake Total 420 


 


Output Total 550 


 


Balance -130 














- Medications


Medications: 


 Current Medications





Apixaban (Eliquis)  2.5 mg PO BID [facility]


   PRN Reason: Protocol


   Last Admin: 05/13/18 17:30 Dose:  2.5 mg


Atorvastatin Calcium (Lipitor)  10 mg PO HS [facility]


   Last Admin: 05/13/18 21:49 Dose:  10 mg


Betamethasone/Clotrimazole (Lotrisone)  1 gm TOP BID [facility]


   Last Admin: 05/13/18 17:32 Dose:  1 applic


Diltiazem HCl (Cardizem Cd)  120 mg PO DAILY [facility]


   Last Admin: 05/13/18 10:41 Dose:  120 mg


Furosemide (Lasix)  20 mg PO DAILY [facility]


   Last Admin: 05/13/18 10:39 Dose:  20 mg


Gabapentin (Neurontin)  400 mg PO TID [facility]


   PRN Reason: Protocol


   Last Admin: 05/13/18 17:32 Dose:  400 mg


Glipizide (Glucotrol)  10 mg PO BID [facility]


   Last Admin: 05/13/18 17:31 Dose:  10 mg


Ceftriaxone Sodium (Rocephin 1 Gram Ivpb)  1 gm in 100 mls @ 100 mls/hr IVPB 

0630 [facility]


   PRN Reason: Protocol


   Last Admin: 05/14/18 06:09 Dose:  100 mls/hr


Insulin Human Regular (Humulin R High)  0 units SC ACHS [facility]


   PRN Reason: Protocol


   Last Admin: 05/14/18 07:31 Dose:  2 units


Metformin HCl (Glucophage)  1,000 mg PO BID [facility]


   Last Admin: 05/13/18 17:31 Dose:  1,000 mg


Nystatin (Nystop Topical Powder)  1 gm TOP BID [facility]


   Last Admin: 05/13/18 17:32 Dose:  1 applic


Pioglitazone HCl (Actos)  45 mg PO DAILY [facility]


   Last Admin: 05/13/18 10:41 Dose:  45 mg


Ramipril (Altace)  10 mg PO DAILY [facility]


   Last Admin: 05/13/18 10:41 Dose:  10 mg











- Constitutional


Appears: Non-toxic, No Acute Distress, Chronically Ill





- Head Exam


Head Exam: ATRAUMATIC, NORMAL INSPECTION, NORMOCEPHALIC





- Eye Exam


Eye Exam: EOMI, Normal appearance





- ENT Exam


ENT Exam: Mucous Membranes Moist





- Respiratory Exam


Respiratory Exam: NORMAL BREATHING PATTERN.  absent: Accessory Muscle Use, 

Respiratory Distress





- Cardiovascular Exam


Cardiovascular Exam: absent: JVD





- GI/Abdominal Exam


GI & Abdominal Exam: Soft.  absent: Distended, Firm, Guarding, Rigid, Tenderness

, Rebound





- Extremities Exam


Extremities Exam: absent: Calf Tenderness


Additional comments: 





bilateral lower extremity swelling. Improving. Abd binders and ace bandage in 

place





- Neurological Exam


Neurological Exam: Alert, Awake, Oriented x3





Assessment and Plan





- Assessment and Plan (Free Text)


Assessment: 





57yo M with chronic lower extremity lymphedema. Here for SIRS, improved. Now in 

TCU for deconditioning. 


Plan: 





- Continue Abx as per ID


- Continue lower extremity compression therapy: Abdominal binders to each thigh 

and Ace wraps from foot up to thigh


- Leg elevation


- No surgical intervention indicated at this time





Further recs as per Rolando Duncan PGY1


surgery pager: [phone]

## 2018-05-14 NOTE — PN
DATE:  05/14/2018



SUBJECTIVE:  The patient has no complaints of any chest pain.  No shortness

of breath.  No headaches or dizziness.



PHYSICAL EXAMINATION:

VITAL SIGNS:  Temperature is 98.4, pulse of 66, blood pressure 130/79,

respirations 18.

GENERAL:  The patient is lying in bed, flat, comfortable.

HEENT:  No oral lesion.  Anicteric sclerae.  Moist mucosa.

NECK:  No JVD, adenopathy, or thyromegaly.

CARDIOVASCULAR:  S1 and S2, regular.  No murmurs, rubs, or gallops.

LUNGS:  Clear to auscultation bilaterally.  No wheeze, rales, or rhonchi.

ABDOMEN:  Bowel sounds are positive, soft, nontender and nondistended.

EXTREMITIES:  Lower extremities, there are chronic skin changes.



ASSESSMENT:

1.  Sepsis, resolved.

2.  Nephrolithiasis.

3.  Lymphedema of the legs.

4.  Obesity with a body mass index of 40.

5.  Atrial fibrillation, on Eliquis.

6.  Hypertension.

7.  Dyslipidemia.

8.  Diabetes type 2.



PLAN:  The patient is currently comfortable.  He is getting physical

therapy.  He is on Actos for his diabetes.  He is going to continue with

ramipril for his hypertension.  He is on Cardizem for his atrial

fibrillation.  He is going to continue with Eliquis for anticoagulation for

his atrial fibrillation.  He is on metformin for his diabetes and Glucotrol

for his diabetes as well.  He is going to continue with Lasix daily.  He is

on Lipitor for dyslipidemia.  He is on Neurontin for neuropathy.  The

patient is being followed by Dr. Quinn from ID.  Patient has finished 7

days of antibiotics.  Today, he is on carbohydrate consistent diet.  His

sugars have been controlled.  The patient's mother is also admitted to the

hospital.







__________________________________________

Delbert Corona MD



DD:  05/14/2018 5:52:35

DT:  05/14/2018 5:55:23

Job # [id number]

## 2018-05-14 NOTE — CP.PCM.PN
Subjective





- Date & Time of Evaluation


Date of Evaluation: 05/14/18


Time of Evaluation: 12:07





- Subjective


Subjective: 


Podiatry Progress Note - Dr. Ramirez





57 y/o male seen and examined at bedside this morning regarding bilateral 

lymphedema with elephantiasis as well as right thigh cellulitis. Pt resting 

comfortably in bedside chair at time of visit. Denies any events overnight. Pt 

is aphasic but understands questions and can respond in yes/no fashion. Denies F

/C/N/V/CP/SOB. Denies pain to his lower extremities. States dressings have 

remained clean dry and intact. 








Objective





- Vital Signs/Intake and Output


Vital Signs (last 24 hours): 


 











Temp Pulse Resp BP Pulse Ox


 


 98.5 F   100 H  16   161/78 H  97 


 


 05/14/18 06:00  05/14/18 10:23  05/14/18 06:00  05/14/18 10:24  05/14/18 06:00








Intake and Output: 


 











 05/14/18 05/14/18





 06:59 18:59


 


Intake Total 420 420


 


Output Total 550 


 


Balance -130 420














- Medications


Medications: 


 Current Medications





Apixaban (Eliquis)  2.5 mg PO BID [facility]


   PRN Reason: Protocol


   Last Admin: 05/14/18 10:24 Dose:  2.5 mg


Atorvastatin Calcium (Lipitor)  10 mg PO HS [facility]


   Last Admin: 05/13/18 21:49 Dose:  10 mg


Betamethasone/Clotrimazole (Lotrisone)  1 gm TOP BID [facility]


   Last Admin: 05/14/18 10:24 Dose:  1 applic


Diltiazem HCl (Cardizem Cd)  120 mg PO DAILY [facility]


   Last Admin: 05/14/18 10:23 Dose:  120 mg


Furosemide (Lasix)  20 mg PO DAILY [facility]


   Last Admin: 05/14/18 10:24 Dose:  20 mg


Gabapentin (Neurontin)  400 mg PO TID [facility]


   PRN Reason: Protocol


   Last Admin: 05/14/18 10:23 Dose:  400 mg


Glipizide (Glucotrol)  10 mg PO BID [facility]


   Last Admin: 05/14/18 10:24 Dose:  10 mg


Ceftriaxone Sodium (Rocephin 1 Gram Ivpb)  1 gm in 100 mls @ 100 mls/hr IVPB 

0630 [facility]


   PRN Reason: Protocol


   Last Admin: 05/14/18 06:09 Dose:  100 mls/hr


Insulin Human Regular (Humulin R High)  0 units SC ACHS [facility]


   PRN Reason: Protocol


   Last Admin: 05/14/18 07:31 Dose:  2 units


Linezolid (Zyvox)  600 mg PO BID [facility]


   PRN Reason: Protocol


Metformin HCl (Glucophage)  1,000 mg PO BID [facility]


   Last Admin: 05/14/18 10:23 Dose:  1,000 mg


Nystatin (Nystop Topical Powder)  1 gm TOP BID [facility]


   Last Admin: 05/14/18 10:22 Dose:  1 applic


Pioglitazone HCl (Actos)  45 mg PO DAILY [facility]


   Last Admin: 05/14/18 10:23 Dose:  45 mg


Ramipril (Altace)  10 mg PO DAILY [facility]


   Last Admin: 05/14/18 10:23 Dose:  10 mg











- Constitutional


Appears: Well, Non-toxic, No Acute Distress





- Extremities Exam


Additional comments: 


Lower extremity focused exam:


Vasc: DP/PT pulses non palpable secondary to edema. Temperature gradient 

reversed. CFT < 3 sec to all digits. Diffuse non pitting pedal edema noted B/L 

from tibial tuberosity to digits


Derm: Diffuse elephantiasis skin changes noted to dorsum of foot with secondary 

scaling B/L. Healed ulcerative lesion noted to anterior mid right leg with dry 

sanguinous drainage, with no purulence, no malodor, no fluctuance. No open 

lesions or clinical suspicion for infection. Slight calor and rubor noted to 

right thigh, resolving. Maceration to right thigh skin folds noted to be 

resolving, with no fissures or breaks in skin noted .


Neuro: Protective sensation grossly intact


Ortho: No tenderness to posterior calf B/L. No tenderness to palpation of B/L 

lower extremities











- Neurological Exam


Neurological Exam: Alert, Awake, Oriented x3





- Psychiatric Exam


Psychiatric exam: Normal Affect, Normal Mood





Assessment and Plan





- Assessment and Plan (Free Text)


Assessment: 


57 y/o diabetic male with 1) bilateral lower extremity lymphedema and 2) 

cellulitis to right thigh





Plan: 





Pt seen and evaluated at bedside


Discussed with attending Dr. Ramirez


Labs and vitals reviewed- afebrile, absent leukocytosis


Continue IV abx per ID 


Lotrisone cream to feet and legs bilateral BID


Nystatin powder between folds of legs BID


ACE compression dressings to B/L lower extremities


Will continue to follow while in house

## 2018-05-14 NOTE — CP.PCM.PN
Subjective





- Date & Time of Evaluation


Date of Evaluation: 05/14/18


Time of Evaluation: 10:10





- Subjective


Subjective: 





Patient is not complaining of pain in his legs, able to do his physical therapy 

well and walking in the hallways well.





Objective





- Vital Signs/Intake and Output


Vital Signs (last 24 hours): 


 











Temp Pulse Resp BP Pulse Ox


 


 98.5 F   66   16   149/80   97 


 


 05/14/18 06:00  05/14/18 06:00  05/14/18 06:00  05/14/18 06:00  05/14/18 06:00








Intake and Output: 


 











 05/14/18 05/14/18





 06:59 18:59


 


Intake Total 420 


 


Output Total 550 


 


Balance -130 














- Medications


Medications: 


 Current Medications





Apixaban (Eliquis)  2.5 mg PO BID [facility]


   PRN Reason: Protocol


   Last Admin: 05/13/18 17:30 Dose:  2.5 mg


Atorvastatin Calcium (Lipitor)  10 mg PO HS [facility]


   Last Admin: 05/13/18 21:49 Dose:  10 mg


Betamethasone/Clotrimazole (Lotrisone)  1 gm TOP BID [facility]


   Last Admin: 05/13/18 17:32 Dose:  1 applic


Diltiazem HCl (Cardizem Cd)  120 mg PO DAILY [facility]


   Last Admin: 05/13/18 10:41 Dose:  120 mg


Furosemide (Lasix)  20 mg PO DAILY [facility]


   Last Admin: 05/13/18 10:39 Dose:  20 mg


Gabapentin (Neurontin)  400 mg PO TID [facility]


   PRN Reason: Protocol


   Last Admin: 05/13/18 17:32 Dose:  400 mg


Glipizide (Glucotrol)  10 mg PO BID [facility]


   Last Admin: 05/13/18 17:31 Dose:  10 mg


Ceftriaxone Sodium (Rocephin 1 Gram Ivpb)  1 gm in 100 mls @ 100 mls/hr IVPB 

0630 [facility]


   PRN Reason: Protocol


   Last Admin: 05/14/18 06:09 Dose:  100 mls/hr


Insulin Human Regular (Humulin R High)  0 units SC ACHS [facility]


   PRN Reason: Protocol


   Last Admin: 05/14/18 07:31 Dose:  2 units


Metformin HCl (Glucophage)  1,000 mg PO BID [facility]


   Last Admin: 05/13/18 17:31 Dose:  1,000 mg


Nystatin (Nystop Topical Powder)  1 gm TOP BID [facility]


   Last Admin: 05/13/18 17:32 Dose:  1 applic


Pioglitazone HCl (Actos)  45 mg PO DAILY [facility]


   Last Admin: 05/13/18 10:41 Dose:  45 mg


Ramipril (Altace)  10 mg PO DAILY [facility]


   Last Admin: 05/13/18 10:41 Dose:  10 mg











- Constitutional


Appears: No Acute Distress, Chronically Ill





- Head Exam


Head Exam: NORMAL INSPECTION





- ENT Exam


ENT Exam: Mucous Membranes Moist





- Neck Exam


Neck Exam: absent: Lymphadenopathy, Meningismus





- Respiratory Exam


Respiratory Exam: Decreased Breath Sounds





- Cardiovascular Exam


Cardiovascular Exam: +S1, +S2





- GI/Abdominal Exam


GI & Abdominal Exam: Soft.  absent: Tenderness





- Extremities Exam


Additional comments: 





both legs with dressings in place





Assessment and Plan





- Assessment and Plan (Free Text)


Plan: 








Assessment


consider sepsis due to right leg skin and skin structure infection associated 

with chronic leg ulcers, clinically improving


history of MRSA and Klebsiella oxytoca right leg cellulitis


history of sepsis secondary to right leg skin/skin structure infection with 

chronic leg ulcers, growing MRSA and sensitive Klebsiella 


history of MRSA cellulitis Sept. 2016


history of Bilateral lower extremity skin and skin structure infection (

Enterobacter, Klebsiella Oxytoca and Group B Strep, as well as MRSA) which was 

treated 9/2015; MRSA and Pseudomonas cellulitis of left leg in Dec 2015, May 

and June 2016


chronic lymphedema of the lower extremities


Morbid obesity with BMI 35


HTN


DM


history of Strep bacteremia


history of hernia surgery


Learning disability





Plan


continue Zyvox and Rocephin (day 7 total today) to complete 7-10 days of therapy

; cultures have been negative


will continue to monitor clinically

## 2018-05-15 RX ADMIN — INSULIN HUMAN SCH: 100 INJECTION, SOLUTION PARENTERAL at 13:49

## 2018-05-15 RX ADMIN — CLOTRIMAZOLE AND BETAMETHASONE DIPROPIONATE SCH APPLIC: 10; .5 CREAM TOPICAL at 13:50

## 2018-05-15 RX ADMIN — NYSTATIN SCH: 100000 POWDER TOPICAL at 17:17

## 2018-05-15 RX ADMIN — DILTIAZEM HYDROCHLORIDE SCH MG: 120 CAPSULE, COATED, EXTENDED RELEASE ORAL at 11:45

## 2018-05-15 RX ADMIN — CLOTRIMAZOLE AND BETAMETHASONE DIPROPIONATE SCH: 10; .5 CREAM TOPICAL at 17:15

## 2018-05-15 RX ADMIN — INSULIN HUMAN SCH: 100 INJECTION, SOLUTION PARENTERAL at 21:33

## 2018-05-15 RX ADMIN — INSULIN HUMAN SCH: 100 INJECTION, SOLUTION PARENTERAL at 18:08

## 2018-05-15 RX ADMIN — AMMONIUM LACTATE SCH CRE: 12 CREAM TOPICAL at 13:50

## 2018-05-15 RX ADMIN — NYSTATIN SCH APPLIC: 100000 POWDER TOPICAL at 11:48

## 2018-05-15 RX ADMIN — INSULIN HUMAN SCH: 100 INJECTION, SOLUTION PARENTERAL at 07:07

## 2018-05-15 NOTE — CP.PCM.PN
Subjective





- Date & Time of Evaluation


Date of Evaluation: 05/15/18


Time of Evaluation: 09:55





- Subjective


Subjective: 





Patient is doing physical therapy well in the gym, no fevers, no pain in the 

legs currently.





Objective





- Vital Signs/Intake and Output


Vital Signs (last 24 hours): 


 











Temp Pulse Resp BP Pulse Ox


 


 98.8 F   71   20   130/69   95 


 


 05/14/18 16:00  05/14/18 16:00  05/14/18 16:00  05/14/18 16:00  05/14/18 16:00








Intake and Output: 


 











 05/14/18 05/15/18





 18:59 06:59


 


Intake Total 420 


 


Balance 420 














- Medications


Medications: 


 Current Medications





Apixaban (Eliquis)  2.5 mg PO BID [facility]


   PRN Reason: Protocol


   Last Admin: 05/14/18 17:06 Dose:  2.5 mg


Atorvastatin Calcium (Lipitor)  10 mg PO HS [facility]


   Last Admin: 05/14/18 21:28 Dose:  10 mg


Betamethasone/Clotrimazole (Lotrisone)  1 gm TOP BID [facility]


   Last Admin: 05/14/18 17:08 Dose:  1 applic


Diltiazem HCl (Cardizem Cd)  120 mg PO DAILY [facility]


   Last Admin: 05/14/18 10:23 Dose:  120 mg


Furosemide (Lasix)  20 mg PO DAILY [facility]


   Last Admin: 05/14/18 10:24 Dose:  20 mg


Gabapentin (Neurontin)  400 mg PO TID [facility]


   PRN Reason: Protocol


   Last Admin: 05/14/18 17:09 Dose:  400 mg


Glipizide (Glucotrol)  10 mg PO BID [facility]


   Last Admin: 05/14/18 17:07 Dose:  10 mg


Insulin Human Regular (Humulin R High)  0 units SC ACHS [facility]


   PRN Reason: Protocol


   Last Admin: 05/14/18 21:37 Dose:  Not Given


Lactic Acid (Lac-Hydrin 12% Cream (140 G))  0 ea TOP DAILY [facility]


Linezolid (Zyvox)  600 mg PO BID [facility]


   PRN Reason: Protocol


   Last Admin: 05/14/18 17:09 Dose:  600 mg


Metformin HCl (Glucophage)  1,000 mg PO BID [facility]


   Last Admin: 05/14/18 17:07 Dose:  1,000 mg


Nystatin (Nystop Topical Powder)  1 gm TOP BID [facility]


   Last Admin: 05/14/18 17:07 Dose:  1 applic


Pioglitazone HCl (Actos)  45 mg PO DAILY [facility]


   Last Admin: 05/14/18 10:23 Dose:  45 mg


Ramipril (Altace)  10 mg PO DAILY [facility]


   Last Admin: 05/14/18 10:23 Dose:  10 mg











- Constitutional


Appears: Non-toxic, Chronically Ill





- ENT Exam


ENT Exam: Mucous Membranes Moist





- Neck Exam


Neck Exam: absent: Lymphadenopathy, Meningismus





- Respiratory Exam


Respiratory Exam: Decreased Breath Sounds





- Cardiovascular Exam


Cardiovascular Exam: +S1, +S2





- GI/Abdominal Exam


GI & Abdominal Exam: Soft.  absent: Tenderness





- Extremities Exam


Additional comments: 





both legs with dressings in place





Assessment and Plan





- Assessment and Plan (Free Text)


Plan: 





Assessment


consider sepsis due to right leg skin and skin structure infection associated 

with chronic leg ulcers, clinically improving


history of MRSA and Klebsiella oxytoca right leg cellulitis


history of sepsis secondary to right leg skin/skin structure infection with 

chronic leg ulcers, growing MRSA and sensitive Klebsiella 


history of MRSA cellulitis Sept. 2016


history of Bilateral lower extremity skin and skin structure infection (

Enterobacter, Klebsiella Oxytoca and Group B Strep, as well as MRSA) which was 

treated 9/2015; MRSA and Pseudomonas cellulitis of left leg in Dec 2015, May 

and June 2016


chronic lymphedema of the lower extremities


Morbid obesity with BMI 35


HTN


DM


history of Strep bacteremia


history of hernia surgery


Learning disability





Plan


continue Zyvox and Rocephin (day 8 total today) to complete 7-10 days of therapy

; cultures have been negative


will continue to monitor clinically

## 2018-05-15 NOTE — CP.PCM.PN
<Annelise Spencer - Last Filed: 05/15/18 13:15>





Subjective





- Date & Time of Evaluation


Date of Evaluation: 05/15/18


Time of Evaluation: 13:15





- Subjective


Subjective: 


Podiatry Progress Note - Dr. Ramirez/Anjel





59 y/o male seen and examined at bedside in [facility] this afternoon regarding 

bilateral lymphedema with elephantiasis as well as right thigh cellulitis. Pt 

resting comfortably in bedside chair at time of visit. Denies any events 

overnight. Pt is aphasic but understands questions and can respond in yes/no 

fashion. Denies F/C/N/V/CP/SOB. Denies pain to his lower extremities. 











Objective





- Vital Signs/Intake and Output


Vital Signs (last 24 hours): 


 











Temp Pulse Resp BP Pulse Ox


 


 98.8 F   63   20   147/73   95 


 


 05/14/18 16:00  05/15/18 11:45  05/14/18 16:00  05/15/18 11:47  05/14/18 16:00








Intake and Output: 


 











 05/15/18 05/15/18





 06:59 18:59


 


Intake Total  420


 


Balance  420














- Medications


Medications: 


 Current Medications





Apixaban (Eliquis)  2.5 mg PO BID [facility]


   PRN Reason: Protocol


   Last Admin: 05/15/18 11:46 Dose:  2.5 mg


Atorvastatin Calcium (Lipitor)  10 mg PO HS [facility]


   Last Admin: 05/14/18 21:28 Dose:  10 mg


Betamethasone/Clotrimazole (Lotrisone)  1 gm TOP BID [facility]


   Last Admin: 05/14/18 17:08 Dose:  1 applic


Diltiazem HCl (Cardizem Cd)  120 mg PO DAILY [facility]


   Last Admin: 05/15/18 11:45 Dose:  120 mg


Furosemide (Lasix)  20 mg PO DAILY [facility]


   Last Admin: 05/15/18 11:47 Dose:  20 mg


Gabapentin (Neurontin)  400 mg PO TID [facility]


   PRN Reason: Protocol


   Last Admin: 05/15/18 11:48 Dose:  400 mg


Glipizide (Glucotrol)  10 mg PO BID [facility]


   Last Admin: 05/15/18 11:47 Dose:  10 mg


Ceftriaxone Sodium (Rocephin 1 Gram Ivpb)  1 gm in 100 mls @ 100 mls/hr IVPB 

DAILY [facility]


   PRN Reason: Protocol


   Last Admin: 05/15/18 11:59 Dose:  100 mls/hr


Insulin Human Regular (Humulin R High)  0 units SC ACHS [facility]


   PRN Reason: Protocol


   Last Admin: 05/15/18 07:07 Dose:  Not Given


Lactic Acid (Lac-Hydrin 12% Cream (140 G))  0 ea TOP DAILY [facility]


Linezolid (Zyvox)  600 mg PO BID [facility]


   PRN Reason: Protocol


   Last Admin: 05/15/18 11:48 Dose:  600 mg


Metformin HCl (Glucophage)  1,000 mg PO BID [facility]


   Last Admin: 05/15/18 11:46 Dose:  1,000 mg


Nystatin (Nystop Topical Powder)  1 gm TOP BID [facility]


   Last Admin: 05/15/18 11:48 Dose:  1 applic


Pioglitazone HCl (Actos)  45 mg PO DAILY [facility]


   Last Admin: 05/15/18 11:45 Dose:  45 mg


Ramipril (Altace)  10 mg PO DAILY [facility]


   Last Admin: 05/15/18 11:45 Dose:  10 mg











- Constitutional


Appears: Well, Non-toxic, No Acute Distress





- Extremities Exam


Additional comments: 


Lower extremity focused exam:


Vasc: DP/PT pulses non palpable secondary to edema. Temperature gradient 

reversed. CFT < 3 sec to all digits. Diffuse non pitting pedal edema noted B/L 

from tibial tuberosity to digits


Derm: Diffuse elephantiasis skin changes noted to dorsum of foot with secondary 

scaling B/L. Healed ulcerative lesion noted to anterior mid right leg with dry 

sanguinous drainage, with no purulence, no malodor, no fluctuance. No open 

lesions or clinical suspicion for infection. Slight calor and rubor noted to 

right thigh, resolving. Maceration to right thigh skin folds noted to be 

resolving, with no fissures or breaks in skin noted .


Neuro: Protective sensation grossly intact


Ortho: No tenderness to posterior calf B/L. No tenderness to palpation of B/L 

lower extremities











- Neurological Exam


Neurological Exam: Alert, Awake, Oriented x3





- Psychiatric Exam


Psychiatric exam: Normal Affect, Normal Mood





Assessment and Plan





- Assessment and Plan (Free Text)


Assessment: 


59 y/o diabetic male with 1) bilateral lower extremity lymphedema and 2) 

cellulitis to right thigh





Plan: 





Pt seen and evaluated at bedside


Discussed with attending Dr. Hobbs


Continue IV abx per ID 


Lotrisone cream to feet and legs bilateral BID


Nystatin powder between folds of legs BID


ACE compression dressings to B/L lower extremities


Will continue to follow while in house











<Manuel Hobbs - Last Filed: 05/15/18 14:06>





Objective





- Vital Signs/Intake and Output


Vital Signs (last 24 hours): 


 











Temp Pulse Resp BP Pulse Ox


 


 98.8 F   63   20   147/73   95 


 


 05/14/18 16:00  05/15/18 11:45  05/14/18 16:00  05/15/18 11:47  05/14/18 16:00








Intake and Output: 


 











 05/15/18 05/15/18





 06:59 18:59


 


Intake Total  420


 


Balance  420














- Medications


Medications: 


 Current Medications





Apixaban (Eliquis)  2.5 mg PO BID [facility]


   PRN Reason: Protocol


   Last Admin: 05/15/18 11:46 Dose:  2.5 mg


Atorvastatin Calcium (Lipitor)  10 mg PO HS [facility]


   Last Admin: 05/14/18 21:28 Dose:  10 mg


Betamethasone/Clotrimazole (Lotrisone)  1 gm TOP BID [facility]


   Last Admin: 05/15/18 13:50 Dose:  1 applic


Diltiazem HCl (Cardizem Cd)  120 mg PO DAILY [facility]


   Last Admin: 05/15/18 11:45 Dose:  120 mg


Furosemide (Lasix)  20 mg PO DAILY [facility]


   Last Admin: 05/15/18 11:47 Dose:  20 mg


Gabapentin (Neurontin)  400 mg PO TID [facility]


   PRN Reason: Protocol


   Last Admin: 05/15/18 11:48 Dose:  400 mg


Glipizide (Glucotrol)  10 mg PO BID [facility]


   Last Admin: 05/15/18 11:47 Dose:  10 mg


Ceftriaxone Sodium (Rocephin 1 Gram Ivpb)  1 gm in 100 mls @ 100 mls/hr IVPB 

DAILY [facility]


   PRN Reason: Protocol


   Last Admin: 05/15/18 11:59 Dose:  100 mls/hr


Insulin Human Regular (Humulin R High)  0 units SC ACHS [facility]


   PRN Reason: Protocol


   Last Admin: 05/15/18 13:49 Dose:  Not Given


Lactic Acid (Lac-Hydrin 12% Cream (140 G))  0 ea TOP DAILY [facility]


   Last Admin: 05/15/18 13:50 Dose:  1 cre


Linezolid (Zyvox)  600 mg PO BID [facility]


   PRN Reason: Protocol


   Last Admin: 05/15/18 11:48 Dose:  600 mg


Metformin HCl (Glucophage)  1,000 mg PO BID [facility]


   Last Admin: 05/15/18 11:46 Dose:  1,000 mg


Nystatin (Nystop Topical Powder)  1 gm TOP BID [facility]


   Last Admin: 05/15/18 11:48 Dose:  1 applic


Pioglitazone HCl (Actos)  45 mg PO DAILY [facility]


   Last Admin: 05/15/18 11:45 Dose:  45 mg


Ramipril (Altace)  10 mg PO DAILY [facility]


   Last Admin: 05/15/18 11:45 Dose:  10 mg











Attending/Attestation





- Attestation


I have personally seen and examined this patient.: Yes


I have fully participated in the care of the patient.: Yes


I have reviewed all pertinent clinical information, including history, physical 

exam and plan: Yes

## 2018-05-16 RX ADMIN — INSULIN HUMAN SCH: 100 INJECTION, SOLUTION PARENTERAL at 22:02

## 2018-05-16 RX ADMIN — NYSTATIN SCH APPLIC: 100000 POWDER TOPICAL at 17:18

## 2018-05-16 RX ADMIN — CLOTRIMAZOLE AND BETAMETHASONE DIPROPIONATE SCH APPLIC: 10; .5 CREAM TOPICAL at 10:55

## 2018-05-16 RX ADMIN — INSULIN HUMAN SCH: 100 INJECTION, SOLUTION PARENTERAL at 17:17

## 2018-05-16 RX ADMIN — INSULIN HUMAN SCH UNITS: 100 INJECTION, SOLUTION PARENTERAL at 12:30

## 2018-05-16 RX ADMIN — NYSTATIN SCH APPLIC: 100000 POWDER TOPICAL at 10:55

## 2018-05-16 RX ADMIN — INSULIN HUMAN SCH UNITS: 100 INJECTION, SOLUTION PARENTERAL at 06:38

## 2018-05-16 RX ADMIN — CLOTRIMAZOLE AND BETAMETHASONE DIPROPIONATE SCH APPLIC: 10; .5 CREAM TOPICAL at 17:18

## 2018-05-16 RX ADMIN — DILTIAZEM HYDROCHLORIDE SCH MG: 120 CAPSULE, COATED, EXTENDED RELEASE ORAL at 10:54

## 2018-05-16 RX ADMIN — AMMONIUM LACTATE SCH CRE: 12 CREAM TOPICAL at 10:54

## 2018-05-16 NOTE — CP.PCM.PN
Subjective





- Date & Time of Evaluation


Date of Evaluation: 05/16/18


Time of Evaluation: 10:56





- Subjective


Subjective: 


Podiatry Progress Note - Dr. Ramirez/Anjel





59 y/o male seen and examined at bedside in TCU this morning regarding 

bilateral lymphedema with elephantiasis as well as right thigh cellulitis. Pt 

resting comfortably in bedside chair at time of visit. Denies any events 

overnight. Occupational therapy at bedside at time of visit, stating patient is 

starting to perform more and more activities on his own without assistance. Pt 

is aphasic but understands questions and can respond in yes/no fashion. Denies F

/C/N/V/CP/SOB. Denies pain to his lower extremities. Denies any new pedal 

complaints.














Objective





- Vital Signs/Intake and Output


Vital Signs (last 24 hours): 


 











Temp Pulse Resp BP Pulse Ox


 


 98.6 F   70   16   96/55 L  93 L


 


 05/15/18 16:00  05/15/18 16:00  05/15/18 16:00  05/15/18 16:00  05/15/18 16:00











- Medications


Medications: 


 Current Medications





Apixaban (Eliquis)  2.5 mg PO BID [facility]


   PRN Reason: Protocol


   Last Admin: 05/15/18 17:14 Dose:  2.5 mg


Atorvastatin Calcium (Lipitor)  10 mg PO HS [facility]


   Last Admin: 05/15/18 21:33 Dose:  10 mg


Betamethasone/Clotrimazole (Lotrisone)  1 gm TOP BID [facility]


   Last Admin: 05/15/18 17:15 Dose:  Not Given


Diltiazem HCl (Cardizem Cd)  120 mg PO DAILY [facility]


   Last Admin: 05/15/18 11:45 Dose:  120 mg


Furosemide (Lasix)  20 mg PO DAILY [facility]


   Last Admin: 05/15/18 11:47 Dose:  20 mg


Gabapentin (Neurontin)  400 mg PO TID [facility]


   PRN Reason: Protocol


   Last Admin: 05/15/18 17:16 Dose:  400 mg


Glipizide (Glucotrol)  10 mg PO BID [facility]


   Last Admin: 05/15/18 17:14 Dose:  10 mg


Insulin Human Regular (Humulin R High)  0 units SC ACHS [facility]


   PRN Reason: Protocol


   Last Admin: 05/16/18 06:38 Dose:  2 units


Lactic Acid (Lac-Hydrin 12% Cream (140 G))  0 ea TOP DAILY [facility]


   Last Admin: 05/15/18 13:50 Dose:  1 cre


Metformin HCl (Glucophage)  1,000 mg PO BID [facility]


   Last Admin: 05/15/18 17:14 Dose:  1,000 mg


Nystatin (Nystop Topical Powder)  1 gm TOP BID [facility]


   Last Admin: 05/15/18 17:17 Dose:  Not Given


Pioglitazone HCl (Actos)  45 mg PO DAILY [facility]


   Last Admin: 05/15/18 11:45 Dose:  45 mg


Ramipril (Altace)  10 mg PO DAILY [facility]


   Last Admin: 05/15/18 11:45 Dose:  10 mg











- Constitutional


Appears: Well, Non-toxic, No Acute Distress





- Extremities Exam


Additional comments: 


Lower extremity focused exam:


Vasc: DP/PT pulses non palpable secondary to edema. Temperature gradient warm 

to cool. CFT < 3 sec to all digits. Diffuse non pitting pedal edema noted B/L 

from tibial tuberosity to digits


Derm: Diffuse elephantiasis skin changes noted to dorsum of foot with secondary 

scaling B/L. Healed ulcerative lesion noted to anterior mid right leg with dry 

sanguinous drainage, with no purulence, no malodor, no fluctuance. No open 

lesions or clinical suspicion for infection. Slight calor and rubor noted to 

right thigh, resolving. Maceration to right thigh skin folds noted to be 

resolving, with no fissures or breaks in skin noted .


Neuro: Protective sensation grossly intact


Ortho: No tenderness to posterior calf B/L. No tenderness to palpation of B/L 

lower extremities








- Neurological Exam


Neurological Exam: Alert, Awake, Oriented x3





- Psychiatric Exam


Psychiatric exam: Normal Affect, Normal Mood





Assessment and Plan





- Assessment and Plan (Free Text)


Assessment: 


59 y/o diabetic male with 1) bilateral lower extremity lymphedema and 2) 

cellulitis to right thigh





Plan: 





Pt seen and evaluated at bedside


Discussed with attending Dr. Ramirez


Continue IV abx per ID 


Lotrisone cream to feet and legs bilateral BID


Nystatin powder between folds of legs BID


ACE compression dressings to B/L lower extremities


Pt is stable for discharge from podiatry standpoint


Upon D/C, pt to follow up in [facility] with Dr. Ramirez/Anjel


Will continue to follow while in house

## 2018-05-16 NOTE — PN
DATE:  05/16/2018



SUBJECTIVE:  The patient is in bed, in no acute distress, nontoxic.



PHYSICAL EXAMINATION:

VITAL SIGNS:  Temperature of 98, blood pressure is 96/50, respiratory rate

of 18, heart rate of 70.

HEENT:  Unremarkable.

NECK:  Supple.

LUNGS:  Have decreased breath sounds.

HEART:  Normal S1, S2.

ABDOMEN:  Soft, nontender.



LABORATORY DATA:  Reviewed.



The patient's legs are also examined, appears to be improving.



ASSESSMENT AND PLAN:  This is a 58-year-old with sepsis with right leg skin

and skin structure infection associated with chronic leg ulcers in a

patient with morbid obesity, hypertension, diabetes.  On Zyvox and

ceftriaxone, today is day #9, would complete 7-10 days of antibiotics. 

Review of orders reveals now the patient is off of antibiotics, afebrile,

has completed adequate therapy.







__________________________________________

Gilbert Villegas MD



DD:  05/16/2018 11:45:07

DT:  05/16/2018 11:45:54

Job # [id number]

## 2018-05-16 NOTE — PN
DATE:  05/16/2018



SUBJECTIVE:  The patient has no complaints of any chest pain.  No shortness

of breath.  No headaches or dizziness.



PHYSICAL EXAMINATION:

VITAL SIGNS:  Temperature is 98.6, pulse of 70, blood pressure 96/55,

respirations 16.

GENERAL:  The patient is lying in bed, flat, comfortable.

HEENT:  No oral lesion.  Anicteric sclerae.  Moist mucosa.

NECK:  No JVD, adenopathy, or thyromegaly.

CARDIOVASCULAR:  S1 and S2, regular.  No murmurs, rubs, or gallops.

LUNGS:  Clear to auscultation bilaterally.  No wheeze, rales, or rhonchi.

ABDOMEN:  Bowel sounds are positive, soft, nontender and nondistended.

EXTREMITIES:  No cyanosis, clubbing or edema.  Lower extremity, the patient

has chronic changes in his legs.



ASSESSMENT:

1.  Sepsis, resolved.

2.  Nephrolithiasis.

3.  Lymphedema of the legs.

4.  Obesity with a body mass index of 40.

5.  Atrial fibrillation, on Eliquis.

6.  Hypertension.

7.  Dyslipidemia.

8.  Diabetes type 2.



PLAN:  The patient is currently comfortable.  He is continuing with

physical therapy on the Transitional Care Unit.  His mother is also on the

Transitional Care Unit.  He is on Actos for his diabetes.  His diabetes is

under control.  He finished his antibiotics.  The patient's cultures have

been negative.  I will discontinue his IV antibiotics at this point.  He is

on Lipitor for dyslipidemia.  The patient is on metformin for his diabetes.

He is on Cardizem for his atrial fibrillation.  He is on Neurontin for his

neuropathy.





__________________________________________

Delbert Corona MD





DD:  05/16/2018 9:16:49

DT:  05/16/2018 9:18:18

Job # [id number]

## 2018-05-17 VITALS — RESPIRATION RATE: 18 BRPM

## 2018-05-17 RX ADMIN — INSULIN HUMAN SCH: 100 INJECTION, SOLUTION PARENTERAL at 22:33

## 2018-05-17 RX ADMIN — NYSTATIN SCH APPLIC: 100000 POWDER TOPICAL at 18:23

## 2018-05-17 RX ADMIN — INSULIN HUMAN SCH: 100 INJECTION, SOLUTION PARENTERAL at 18:22

## 2018-05-17 RX ADMIN — CLOTRIMAZOLE AND BETAMETHASONE DIPROPIONATE SCH APPLIC: 10; .5 CREAM TOPICAL at 18:23

## 2018-05-17 RX ADMIN — CLOTRIMAZOLE AND BETAMETHASONE DIPROPIONATE SCH APPLIC: 10; .5 CREAM TOPICAL at 10:27

## 2018-05-17 RX ADMIN — INSULIN HUMAN SCH: 100 INJECTION, SOLUTION PARENTERAL at 06:42

## 2018-05-17 RX ADMIN — DILTIAZEM HYDROCHLORIDE SCH MG: 120 CAPSULE, COATED, EXTENDED RELEASE ORAL at 10:25

## 2018-05-17 RX ADMIN — AMMONIUM LACTATE SCH APPLIC: 12 CREAM TOPICAL at 10:26

## 2018-05-17 RX ADMIN — INSULIN HUMAN SCH: 100 INJECTION, SOLUTION PARENTERAL at 12:10

## 2018-05-17 RX ADMIN — NYSTATIN SCH APPLIC: 100000 POWDER TOPICAL at 10:27

## 2018-05-17 NOTE — PN
DATE:  05/17/2018



SUBJECTIVE:  The patient seen in room 303.  No fevers, no chills.  No

nausea, comfortable.  He is off of antibiotics.



PHYSICAL EXAMINATION:

VITAL SIGNS:  Temperature is 98, blood pressure is 138/70, respiratory rate

20, heart rate of 71.

HEENT:  Unremarkable.

NECK:  Supple.

LUNGS:  Have decreased breath sounds.

HEART:  Normal S1, S2.

ABDOMEN:  Soft, nontender.  No rebound, no guarding, no masses.



LABORATORY DATA:  Reviewed.



ASSESSMENT AND PLAN:  A 58-year-old morbidly obese male with a body mass

index of 35, seen earlier this morning in room 303 with a history of skin

and skin structure infection associated with a chronic leg ulcer and morbid

obesity, diabetes, hypertension.  He has completed his antibiotic therapy

of Zyvox and ceftriaxone 10 days and now off of antibiotics, afebrile.  No

Hep-Lock.  He is at risk for developing nosocomial infection.





__________________________________________

Gilbert Villegas MD



DD:  05/17/2018 13:27:04

DT:  05/17/2018 13:28:42

Job # [id number]

## 2018-05-18 VITALS — OXYGEN SATURATION: 96 % | HEART RATE: 65 BPM | TEMPERATURE: 98.2 F

## 2018-05-18 RX ADMIN — NYSTATIN SCH APPLIC: 100000 POWDER TOPICAL at 09:11

## 2018-05-18 RX ADMIN — INSULIN HUMAN SCH: 100 INJECTION, SOLUTION PARENTERAL at 21:50

## 2018-05-18 RX ADMIN — INSULIN HUMAN SCH: 100 INJECTION, SOLUTION PARENTERAL at 06:57

## 2018-05-18 RX ADMIN — INSULIN HUMAN SCH: 100 INJECTION, SOLUTION PARENTERAL at 17:26

## 2018-05-18 RX ADMIN — DILTIAZEM HYDROCHLORIDE SCH MG: 120 CAPSULE, COATED, EXTENDED RELEASE ORAL at 09:09

## 2018-05-18 RX ADMIN — CLOTRIMAZOLE AND BETAMETHASONE DIPROPIONATE SCH APPLIC: 10; .5 CREAM TOPICAL at 17:51

## 2018-05-18 RX ADMIN — CLOTRIMAZOLE AND BETAMETHASONE DIPROPIONATE SCH APPLIC: 10; .5 CREAM TOPICAL at 09:11

## 2018-05-18 RX ADMIN — INSULIN HUMAN SCH UNITS: 100 INJECTION, SOLUTION PARENTERAL at 12:29

## 2018-05-18 RX ADMIN — AMMONIUM LACTATE SCH APPLIC: 12 CREAM TOPICAL at 09:10

## 2018-05-18 RX ADMIN — NYSTATIN SCH APPLIC: 100000 POWDER TOPICAL at 17:51

## 2018-05-18 NOTE — CP.PCM.PN
Subjective





- Date & Time of Evaluation


Date of Evaluation: 05/18/18


Time of Evaluation: 11:45





- Subjective


Subjective: 





Podiatry Progress Note - Dr. Ramirez/Anjel





59 y/o male seen and examined at bedside in TCU this morning regarding 

bilateral lymphedema with elephantiasis as well as right thigh cellulitis. Pt 

resting comfortably in bedside chair at time of visit, in NAD. Denies any 

events overnight. Patient doing well in physical therapy. Reports has been 

ambuating around in socks. Pt is aphasic but understands questions and can 

respond in yes/no fashion. Denies F/C/N/V/CP/SOB. Denies pain to his lower 

extremities. Denies any new pedal complaints.








Objective





- Vital Signs/Intake and Output


Vital Signs (last 24 hours): 


 











Temp Pulse Resp BP Pulse Ox


 


 98.6 F   68   18   139/69   98 


 


 05/17/18 16:00  05/18/18 09:09  05/17/18 16:00  05/18/18 09:09  05/17/18 16:00








Intake and Output: 


 











 05/18/18 05/18/18





 06:59 18:59


 


Intake Total 480 


 


Output Total 750 


 


Balance -270 














- Medications


Medications: 


 Current Medications





Apixaban (Eliquis)  2.5 mg PO BID [facility]


   PRN Reason: Protocol


   Last Admin: 05/18/18 09:09 Dose:  2.5 mg


Atorvastatin Calcium (Lipitor)  [facility]


   Last Admin: 05/17/18 21:12 Dose:  10 mg


Betamethasone/Clotrimazole (Lotrisone)  1 gm TOP [facility]ID [facility]


   Last Admin: 05/18/18 09:11 Dose:  1 applic


Diltiazem HCl (Cardizem Cd)  120 mg PO DAILY [facility]


   Last Admin: 05/18/18 09:09 Dose:  120 mg


Furosemide (Lasix)  20 mg PO DAILY [facility]


   Last Admin: 05/18/18 09:09 Dose:  20 mg


Gabapentin (Neurontin)  400 mg PO TID [facility]


   PRN Reason: Protocol


   Last Admin: 05/18/18 13:09 Dose:  400 mg


Glipizide (Glucotrol)  10 mg PO BID [facility]


   Last Admin: 05/18/18 09:08 Dose:  10 mg


Insulin Human Regular (Humulin R High)  0 units SC ACHS [facility]


   PRN Reason: Protocol


   Last Admin: 05/18/18 12:29 Dose:  2 units


Lactic Acid (Lac-Hydrin 12% Cream (140 G))  0 ea TOP [facility]ILY [facility]


   Last Admin: 05/18/18 09:10 Dose:  1 applic


Metformin HCl (Glucophage)  1,000 mg PO BID [facility]


   Last Admin: 05/18/18 09:09 Dose:  1,000 mg


Nystatin (Nystop Topical Powder)  1 gm TOP [facility]ID [facility]


   Last Admin: 05/18/18 09:11 Dose:  1 applic


Pioglitazone HCl (Actos)  45 mg PO DAILY [facility]


   Last Admin: 05/18/18 09:09 Dose:  45 mg


Ramipril (Altace)  10 mg PO DAILY [facility]


   Last Admin: 05/18/18 09:08 Dose:  10 mg











- Constitutional


Appears: Well, Non-toxic, No Acute Distress





- Extremities Exam


Extremities Exam: absent: Calf Tenderness


Additional comments: 





Lower extremity focused exam:


Vasc: DP/PT pulses non palpable secondary to edema. Temperature gradient warm 

to cool. CFT < 3 sec to all digits. Diffuse non pitting pedal edema noted B/L 

from tibial tuberosity to digits


Derm: Diffuse elephantiasis skin changes noted to dorsum of foot with secondary 

scaling B/L. Healed ulcerative lesion noted to anterior mid right leg with dry 

sanguinous drainage, with no purulence, no malodor, no fluctuance. No open 

lesions or clinical suspicion for infection. Slight calor and rubor noted to 

right thigh, resolving. Maceration to right thigh skin folds noted to be 

resolving, with no fissures or breaks in skin noted .


Neuro: Protective sensation grossly intact


Ortho: No tenderness to posterior calf B/L. No tenderness to palpation of B/L 

lower extremities











Assessment and Plan





- Assessment and Plan (Free Text)


Assessment: 








59 y/o diabetic male with 1) bilateral lower extremity lymphedema and 2) 

cellulitis to right thigh resolving


Plan: 





Pt seen and evaluated at bedside


Discussed with attending Dr. Hobbs


Lotrisone cream to feet and legs bilateral BID


Nystatin powder between folds of legs BID


ACE compression dressings to B/L lower extremities


Surgical shoe ordered. Patient to WBAT in surgical shoe


UNNA boots ordered


Pt is stable for discharge from podiatry standpoint


Prior to d/c tomorrow patient to be wrapped in UNNA boots


Pt to follow [place] [facility] with Dr. Ramirez/Anjel


Will continue to follow while in house

## 2018-05-18 NOTE — CP.PCM.PN
Subjective





- Date & Time of Evaluation


Date of Evaluation: 05/18/18


Time of Evaluation: 11:30





- Subjective


Subjective: 





Comfortable in bed, no fevers.





Objective





- Vital Signs/Intake and Output


Vital Signs (last 24 hours): 


 











Temp Pulse Resp BP Pulse Ox


 


 98.6 F   68   18   116/63   98 


 


 05/17/18 16:00  05/17/18 16:00  05/17/18 16:00  05/17/18 16:00  05/17/18 16:00








Intake and Output: 


 











 05/18/18 05/18/18





 06:59 18:59


 


Intake Total 480 


 


Output Total 750 


 


Balance -270 














- Medications


Medications: 


 Current Medications





Apixaban (Eliquis)  2.5 mg PO BID [facility]


   PRN Reason: Protocol


   Last Admin: 05/17/18 18:22 Dose:  2.5 mg


Atorvastatin Calcium (Lipitor)  10 mg PO HS [facility]


   Last Admin: 05/17/18 21:12 Dose:  10 mg


Betamethasone/Clotrimazole (Lotrisone)  1 gm TOP BID [facility]


   Last Admin: 05/17/18 18:23 Dose:  1 applic


Diltiazem HCl (Cardizem Cd)  120 mg PO DAILY [facility]


   Last Admin: 05/17/18 10:25 Dose:  120 mg


Furosemide (Lasix)  20 mg PO DAILY [facility]


   Last Admin: 05/17/18 10:27 Dose:  20 mg


Gabapentin (Neurontin)  400 mg PO TID [facility]


   PRN Reason: Protocol


   Last Admin: 05/17/18 18:23 Dose:  400 mg


Glipizide (Glucotrol)  10 mg PO BID [facility]


   Last Admin: 05/17/18 18:22 Dose:  10 mg


Insulin Human Regular (Humulin R High)  0 units SC ACHS [facility]


   PRN Reason: Protocol


   Last Admin: 05/18/18 06:57 Dose:  Not Given


Lactic Acid (Lac-Hydrin 12% Cream (140 G))  0 ea TOP DAILY [facility]


   Last Admin: 05/17/18 10:26 Dose:  1 applic


Metformin HCl (Glucophage)  1,000 mg PO BID [facility]


   Last Admin: 05/17/18 18:22 Dose:  1,000 mg


Nystatin (Nystop Topical Powder)  1 gm TOP BID [facility]


   Last Admin: 05/17/18 18:23 Dose:  1 applic


Pioglitazone HCl (Actos)  45 mg PO DAILY [facility]


   Last Admin: 05/17/18 10:25 Dose:  45 mg


Ramipril (Altace)  10 mg PO DAILY [facility]


   Last Admin: 05/17/18 10:25 Dose:  10 mg











- Constitutional


Appears: Non-toxic, Chronically Ill





- Head Exam


Head Exam: NORMAL INSPECTION





- ENT Exam


ENT Exam: Mucous Membranes Moist





- Neck Exam


Neck Exam: absent: Lymphadenopathy, Meningismus





- Respiratory Exam


Respiratory Exam: Decreased Breath Sounds





- Cardiovascular Exam


Cardiovascular Exam: +S1, +S2





- GI/Abdominal Exam


GI & Abdominal Exam: Soft.  absent: Tenderness





- Extremities Exam


Additional comments: 





both legs with dressings in place





Assessment and Plan





- Assessment and Plan (Free Text)


Plan: 





Assessment


S/P sepsis due to right leg skin and skin structure infection associated with 

chronic leg ulcers, clinically improved and S/P treatment


history of MRSA and Klebsiella oxytoca right leg cellulitis


history of sepsis secondary to right leg skin/skin structure infection with 

chronic leg ulcers, growing MRSA and sensitive Klebsiella 


history of MRSA cellulitis Sept. 2016


history of Bilateral lower extremity skin and skin structure infection (

Enterobacter, Klebsiella Oxytoca and Group B Strep, as well as MRSA) which was 

treated 9/2015; MRSA and Pseudomonas cellulitis of left leg in Dec 2015, May 

and June 2016


chronic lymphedema of the lower extremities


Morbid obesity with BMI 35


HTN


DM


history of Strep bacteremia


history of hernia surgery


Learning disability





Plan


continue monitor the patient off antibiotics while the patient is in the 

hospital since he is at risk for nosocomial infections

## 2018-05-19 VITALS — DIASTOLIC BLOOD PRESSURE: 67 MMHG | SYSTOLIC BLOOD PRESSURE: 125 MMHG

## 2018-05-19 RX ADMIN — INSULIN HUMAN SCH: 100 INJECTION, SOLUTION PARENTERAL at 07:26

## 2018-05-19 RX ADMIN — CLOTRIMAZOLE AND BETAMETHASONE DIPROPIONATE SCH APPLIC: 10; .5 CREAM TOPICAL at 09:08

## 2018-05-19 RX ADMIN — DILTIAZEM HYDROCHLORIDE SCH MG: 120 CAPSULE, COATED, EXTENDED RELEASE ORAL at 09:07

## 2018-05-19 RX ADMIN — NYSTATIN SCH APPLIC: 100000 POWDER TOPICAL at 09:08

## 2018-05-19 RX ADMIN — AMMONIUM LACTATE SCH APPLIC: 12 CREAM TOPICAL at 09:08

## 2018-05-19 NOTE — PN
DATE:  05/19/2018



SUBJECTIVE:  The patient is in bed.  The patient was seen earlier today in

room 303.  No fevers and no chills.



OBJECTIVE:

VITAL SIGNS:  On exam, temperature is 98, blood pressure is 117/60,

respiratory rate of 18.

HEENT:  Examination is unremarkable.

NECK:  Supple.

LUNGS:  Have decreased breath sounds.

HEART:  Normal S1, S2.

ABDOMEN:  Soft, nontender.



DATA:  Laboratory examination are reviewed.



ASSESSMENT AND PLAN:  This is a 58-year-old male with obesity with body

mass index of 35 and status post sepsis with a right leg skin and skin

structure infection associated with chronic leg ulcers, history of MRSA,

and Klebsiella oxytocin or right leg cellulitis, history of sepsis

secondary right leg skin and skin structure and chronic leg ulcers, growing

MRSA and sensitive Klebsiella, currently off of antibiotic in a patient

with a chronic lymphedema.  The patient is at risk for developing

nosocomial infections.







__________________________________________

Gilbert Villegas MD



DD:  05/19/2018 8:46:19

DT:  05/19/2018 8:50:09

Job # [id number]

## 2018-05-19 NOTE — CP.PCM.PN
Subjective





- Date & Time of Evaluation


Date of Evaluation: 05/19/18


Time of Evaluation: 09:54





- Subjective


Subjective: 


Podiatry Progress Note - Dr. Hobbs





57 y/o male seen and examined at bedside in TCU this morning with attending, 

Dr. Hobbs, regarding bilateral lymphedema with elephantiasis as well as right 

thigh cellulitis. Pt resting comfortably in bedside chair at time of visit, in 

NAD. Denies any events overnight. Denies F/C/N/V/CP/SOB. Denies pain to his 

lower extremities. Denies any new pedal complaints.





Objective





- Vital Signs/Intake and Output


Vital Signs (last 24 hours): 


 











Temp Pulse Resp BP Pulse Ox


 


 98.2 F   65   18   125/67   96 


 


 05/18/18 16:30  05/18/18 16:30  05/18/18 16:30  05/19/18 09:07  05/18/18 16:30








Intake and Output: 


 











 05/19/18 05/19/18





 06:59 18:59


 


Intake Total 420 


 


Output Total 800 


 


Balance -380 














- Medications


Medications: 


 Current Medications





Apixaban (Eliquis)  2.5 mg PO BID [facility]


   PRN Reason: Protocol


   Last Admin: 05/19/18 09:08 Dose:  2.5 mg


Atorvastatin Calcium (Lipitor)  [facility] PO HS [facility]


   Last Admin: 05/18/18 21:50 Dose:  10 mg


Betamethasone/Clotrimazole (Lotrisone)  1 gm TOP BID [facility]


   Last Admin: 05/19/18 09:08 Dose:  1 applic


Diltiazem HCl (Cardizem Cd)  120 mg PO DAILY [facility]


   Last Admin: 05/19/18 09:07 Dose:  120 mg


Furosemide (Lasix)  20 mg PO DAILY [facility]


   Last Admin: 05/19/18 09:07 Dose:  20 mg


Gabapentin (Neurontin)  400 mg PO TID [facility]


   PRN Reason: Protocol


   Last Admin: 05/19/18 09:08 Dose:  400 mg


Glipizide (Glucotrol)  10 mg PO BID [facility]


   Last Admin: 05/19/18 09:08 Dose:  10 mg


Insulin Human Regular (Humulin R High)  0 units SC ACHS [facility]


   PRN Reason: Protocol


   Last Admin: 05/19/18 07:26 Dose:  Not Given


Lactic Acid (Lac-Hydrin 12% Cream (140 G))  0 ea TOP DAILY [facility]


   Last Admin: 05/19/18 09:08 Dose:  1 applic


Metformin HCl (Glucophage)  1,000 mg PO BID [facility]


   Last Admin: 05/19/18 09:07 Dose:  1,000 mg


Nystatin (Nystop Topical Powder)  1 gm TOP BID [facility]


   Last Admin: 05/19/18 09:08 Dose:  1 applic


Pioglitazone HCl (Actos)  45 mg PO DAILY [facility]


   Last Admin: 05/19/18 09:07 Dose:  45 mg


Ramipril (Altace)  10 mg PO DAILY [facility]


   Last Admin: 05/19/18 09:07 Dose:  10 mg











- Constitutional


Appears: Well, Non-toxic, No Acute Distress





- Extremities Exam


Additional comments: 


Lower extremity focused exam:





Vasc: DP and PT pulses non-palpable secondary to edema. Temperature gradient 

warm to cool. CFT < 3 sec to all digits. Diffuse non-pitting pedal edema noted B

/L from tibial tuberosity to digits





Derm: Diffuse elephantiasis skin changes noted to dorsum of foot with secondary 

scaling B/L. Healed ulcerative lesion noted to anterior mid right leg with dry 

sanguinous drainage, with no purulence, no malodor, no fluctuance. No open 

lesions or clinical suspicion for infection. Slight calor and rubor noted to 

right thigh, resolving. Maceration to right thigh skin folds noted to be 

resolving, with no fissures or breaks in skin noted 





Neuro: Protective sensation grossly intact





Ortho: No tenderness to posterior calf B/L. No tenderness to palpation of B/L 

lower extremities





- Neurological Exam


Neurological Exam: Alert, Awake





- Psychiatric Exam


Psychiatric exam: Normal Affect, Normal Mood





Assessment and Plan





- Assessment and Plan (Free Text)


Assessment: 


57 y/o diabetic male with 1) bilateral lower extremity lymphedema and 2) 

cellulitis to right thigh resolving


Plan: 


Pt seen and evaluated at bedside with attending Dr. Hobbs


labs, chart, vitals reviewed


Surgical shoe ordered. Patient to WBAT in surgical shoe


3 layer compression dressing applied to bilateral lower extremties


Pt is stable for discharge from podiatry standpoint


[place] [facility] [place] Dr. Ramirez/Anjel


Patient stable for d/c from podiatry standpoint

## 2018-05-19 NOTE — DS
HISTORY OF PRESENT ILLNESS:  The patient has no complaint of any chest

pain.  No shortness of breath or headaches or dizziness.  He is going to be

discharged home today to follow up as an outpatient.



PHYSICAL EXAMINATION:

VITAL SIGNS:  Temperature is 98.2, pulse is 65, blood pressure is 125/67,

respirations 18.

GENERAL:  The patient is lying in bed, flat, comfortable.

HEENT:  No oral lesion.  Anicteric sclerae.  Moist mucosa.

NECK:  No JVD, adenopathy, or thyromegaly.

CARDIOVASCULAR:  S1 and S2, regular.  No murmurs, rubs, or gallops.

LUNGS:  Clear to auscultation bilaterally.  No wheeze, rales, or rhonchi.

ABDOMEN:  Bowel sounds are positive, soft, nontender and nondistended.

EXTREMITIES:  Lower extremities, there is chronic skin changes in the legs.



ASSESSMENT:

1.  Sepsis, resolved.

2.  Nephrolithiasis.

3.  Lymphedema of the legs.

4.  Obesity with a body mass index of 40.

5.  Atrial fibrillation, on Eliquis.

6.  Hypertension.

7.  Dyslipidemia.

8.  Diabetes type 2.



PLAN:  The patient is currently comfortable.  He is going to continue his

Actos for his diabetes.  He is on ramipril for his hypertension.  The

patient is on metformin for his diabetes as well.  He is on Eliquis for his

atrial fibrillation.  He is on Lasix daily.  The patient is on Lipitor for

dyslipidemia.  He is going to be discharged home today and follow up Dr. Peraza.



CONDITION:  Stable.



ACTIVITIES:  Increase as tolerated.





__________________________________________

Delbert Corona MD



DD:  05/19/2018 11:56:34

DT:  05/19/2018 11:57:31

Job # [id number]

## 2018-11-09 ENCOUNTER — HOSPITAL ENCOUNTER (INPATIENT)
Dept: HOSPITAL 42 - ED | Age: 58
LOS: 4 days | Discharge: NURSING, REHAB OR LONG-TERM CARE | DRG: 603 | End: 2018-11-13
Attending: INTERNAL MEDICINE | Admitting: INTERNAL MEDICINE
Payer: MEDICARE

## 2018-11-09 VITALS — BODY MASS INDEX: 35.3 KG/M2

## 2018-11-09 DIAGNOSIS — I25.10: ICD-10-CM

## 2018-11-09 DIAGNOSIS — I50.9: ICD-10-CM

## 2018-11-09 DIAGNOSIS — I87.2: ICD-10-CM

## 2018-11-09 DIAGNOSIS — Z79.01: ICD-10-CM

## 2018-11-09 DIAGNOSIS — E78.5: ICD-10-CM

## 2018-11-09 DIAGNOSIS — G40.909: ICD-10-CM

## 2018-11-09 DIAGNOSIS — F81.9: ICD-10-CM

## 2018-11-09 DIAGNOSIS — E11.622: ICD-10-CM

## 2018-11-09 DIAGNOSIS — I48.91: ICD-10-CM

## 2018-11-09 DIAGNOSIS — Z79.84: ICD-10-CM

## 2018-11-09 DIAGNOSIS — E66.01: ICD-10-CM

## 2018-11-09 DIAGNOSIS — Z79.4: ICD-10-CM

## 2018-11-09 DIAGNOSIS — Z86.14: ICD-10-CM

## 2018-11-09 DIAGNOSIS — Z87.891: ICD-10-CM

## 2018-11-09 DIAGNOSIS — L03.116: Primary | ICD-10-CM

## 2018-11-09 DIAGNOSIS — Z88.0: ICD-10-CM

## 2018-11-09 DIAGNOSIS — Z99.3: ICD-10-CM

## 2018-11-09 DIAGNOSIS — F79: ICD-10-CM

## 2018-11-09 DIAGNOSIS — I11.0: ICD-10-CM

## 2018-11-09 DIAGNOSIS — L98.499: ICD-10-CM

## 2018-11-09 DIAGNOSIS — E11.51: ICD-10-CM

## 2018-11-09 LAB
ALBUMIN SERPL-MCNC: 4.2 G/DL (ref 3–4.8)
ALBUMIN/GLOB SERPL: 1.3 {RATIO} (ref 1.1–1.8)
ALT SERPL-CCNC: 37 U/L (ref 7–56)
APTT BLD: 46.2 SECONDS (ref 25.1–36.5)
AST SERPL-CCNC: 23 U/L (ref 17–59)
BASOPHILS # BLD AUTO: 0.02 K/MM3 (ref 0–2)
BASOPHILS NFR BLD: 0.2 % (ref 0–3)
BUN SERPL-MCNC: 10 MG/DL (ref 7–21)
CALCIUM SERPL-MCNC: 9.7 MG/DL (ref 8.4–10.5)
EOSINOPHIL # BLD: 0.1 10*3/UL (ref 0–0.7)
EOSINOPHIL NFR BLD: 1 % (ref 1.5–5)
ERYTHROCYTE [DISTWIDTH] IN BLOOD BY AUTOMATED COUNT: 12.6 % (ref 11.5–14.5)
GFR NON-AFRICAN AMERICAN: > 60
GRANULOCYTES # BLD: 7.55 10*3/UL (ref 1.4–6.5)
GRANULOCYTES NFR BLD: 76.6 % (ref 50–68)
HGB BLD-MCNC: 14.7 G/DL (ref 14–18)
INR PPP: 1.03
LYMPHOCYTES # BLD: 1.5 10*3/UL (ref 1.2–3.4)
LYMPHOCYTES NFR BLD AUTO: 14.9 % (ref 22–35)
MCH RBC QN AUTO: 29.9 PG (ref 25–35)
MCHC RBC AUTO-ENTMCNC: 33.9 G/DL (ref 31–37)
MCV RBC AUTO: 88.2 FL (ref 80–105)
MONOCYTES # BLD AUTO: 0.7 10*3/UL (ref 0.1–0.6)
MONOCYTES NFR BLD: 7.3 % (ref 1–6)
PLATELET # BLD: 269 10^3/UL (ref 120–450)
PMV BLD AUTO: 10.3 FL (ref 7–11)
PROTHROMBIN TIME: 11.8 SECONDS (ref 9.4–12.5)
RBC # BLD AUTO: 4.91 10^6/UL (ref 3.5–6.1)
TROPONIN I SERPL-MCNC: < 0.01 NG/ML
WBC # BLD AUTO: 9.9 10^3/UL (ref 4.5–11)

## 2018-11-09 NOTE — ED PDOC
Arrival/HPI





- General


Chief Complaint: Lower Extremity Problem/Injury


Time Seen by Provider: 11/09/18 17:39


Historian: Patient





- History of Present Illness


Narrative History of Present Illness (Text): 





11/09/18 19:28


58yr old male presents today with a 3 day history of ulcer to the left dorsal 

foot. pt states he gets unna boot changed weekly and noticed pain and wound to 

the foot. pt denies fever/chills. denies  cp or sob. no abdominal pain. no 

n/v/d/.c no other complaints. 











Past Medical History





- Provider Review


Nursing Documentation Reviewed: Yes





- Travel History


Have you recently traveled outside [place] w/in the past 3 mons?: No





- Infectious Disease


Hx of Infectious Diseases: None





- Tetanus Immunization


Tetanus Immunization: Unknown





- Cardiac


Hx Cardiac Disorders: Yes


Hx Congestive Heart Failure: Yes


Hx Hypertension: Yes





- Pulmonary


Hx Respiratory Disorders: No





- Neurological


Hx Neurological Disorder: Yes


Hx Seizures: Yes


Other/Comment: Epilepsy





- HEENT


Hx HEENT Disorder: Yes


Other/Comment: wears glasses





- Renal


Hx Renal Disorder: No





- Endocrine/Metabolic


Hx Diabetes Mellitus Type 2: Yes





- Hematological/Oncological


Hx Blood Disorders: No


Other/Comment: blood infection





- Integumentary


Hx Dermatological Disorder: Yes


Other/Comment: multiple wounds on the rt leg, cellulitis in b/l extreminity. 

Left thigh cellulitis





- Musculoskeletal/Rheumatological


Hx Musculoskeletal Disorders: Yes


Hx Falls: Yes





- Gastrointestinal


Hx Gastrointestinal Disorders: Yes





- Genitourinary/Gynecological


Hx Genitourinary Disorders: No





- Psychiatric


Hx Psychophysiologic Disorder: Yes


Hx Substance Use: No


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





- Surgical History


Hx Amputation: No


Hx Appendectomy: No


Hx Cholecystectomy: No


Hx Gastric Bypass Surgery: No


Hx Hysterectomy: No


Hx Joint Replacement: No


Hx Kidney Transplant: No


Hx Liver Transplant: No


Hx Mastectomy: No


Hx Musculoskeletal Surgery: No


Hx Open Heart Surgery: No


Hx Orthopedic Surgery: No


Hx Splenectomy: No


Hx Valve Replacement: No





- Anesthesia


Hx Anesthesia: Yes


Hx Anesthesia Reactions: No


Hx Malignant Hyperthermia: No





- Suicidal Assessment


Feels Threatened In Home Enviroment: No





Family/Social History





- Physician Review


Nursing Documentation Reviewed: Yes


Family/Social History: Unknown Family HX


Smoking Status: Former Smoker


Hx Alcohol Use: No


Hx Substance Use: No


Hx Substance Use Treatment: No





Allergies/Home Meds


Allergies/Adverse Reactions: 


Allergies





piperacillin Allergy (Verified 11/09/18 23:26)


   ANAPHYLAXIS








Home Medications: 


                                    Home Meds











 Medication  Instructions  Recorded  Confirmed


 


Apixaban [Eliquis] 2.5 mg PO BID 08/18/17 11/09/18


 


Atorvastatin [Lipitor] 10 mg PO DAILY 08/18/17 11/09/18


 


Diltiazem HCl [Diltiazem 24Hr ER] 120 mg PO DAILY 08/18/17 11/09/18


 


Ferrous Fum/Vit C/B12/Stomc 1 cap PO DAILY 08/18/17 11/09/18





[Hematogen Softgel]   


 


Furosemide [Lasix] 20 mg PO DAILY 08/18/17 11/09/18


 


Gabapentin [Neurontin] 400 mg PO TID 08/18/17 11/09/18


 


GlipiZIDE [Glucotrol] 10 mg PO BID 08/18/17 11/09/18


 


Insulin Lispro [Humalog Kwikpen 1 unit SQ ACHS 08/18/17 11/09/18





U-100]   


 


Metformin HCl [Glucophage] 1,000 mg PO BID 08/18/17 11/09/18


 


Omeprazole 20 mg PO DAILY 08/18/17 11/09/18


 


Pioglitazone HCl 45 mg PO DAILY 08/18/17 11/09/18


 


Potassium Chloride [Klor-Con M20] 20 meq PO DAILY 08/18/17 11/09/18


 


Ramipril [Altace] 10 mg PO DAILY 08/18/17 11/09/18














Review of Systems





- Review of Systems


Constitutional: absent: Fatigue, Fevers


Respiratory: absent: SOB, Cough


Cardiovascular: absent: Chest Pain, Palpitations


Gastrointestinal: absent: Abdominal Pain, Vomiting


Musculoskeletal: Arthralgias


Skin: Skin Lesions


Neurological: absent: Headache, Dizziness


Psychiatric: absent: Anxiety





Physical Exam


Vital Signs Reviewed: Yes





Vital Signs











  Temp Pulse Resp BP Pulse Ox


 


 11/09/18 17:57  99.8 F H  88  16  150/80  97











Temperature: Afebrile


Blood Pressure: Normal


Pulse: Regular


Respiratory Rate: Normal


Appearance: Positive for: Well-Appearing, Non-Toxic, Comfortable


Pain Distress: None


Mental Status: Positive for: Alert and Oriented X 3





- Systems Exam


Head: Present: Atraumatic


Mouth: Present: Moist Mucous Membranes


Neck: Present: Normal Range of Motion


Respiratory/Chest: Present: Clear to Auscultation, Good Air Exchange.  No: 

Respiratory Distress, Accessory Muscle Use


Cardiovascular: Present: Regular Rate and Rhythm, Normal S1, S2.  No: Murmurs


Lower Extremity: Present: Other


Skin: Present: Warm, Dry


Psychiatric: Present: Alert, Oriented x 3 (there is a dime sized ulcer noted 

with purulent discharge to dorsal aspect of foot.)





Medical Decision Making


ED Course and Treatment: 





11/09/18 19:29


pt is non toxic well appearing; no distress. stable vitals. 





cbc: wnl


cmp: glucose:330





blood cultures pending. 








unna boot removed; pt with dime sized ulcer to left foot.  + edema with verruca 

noted. 








case discussed with dr. brewer; accepts admission. 











impression; ulcer, foot  


admit. 








11/09/18 20:10


i was called to bedside for possible allergic reaction to zosyn, per RN, without

 minutes of administration of zosyn patient started to complain of burning 

sensation in chest and became tachycardic and diaphoretic.  lungs were CTA 

bilaterally. no erythema or hives noted. 


solumedrol and benadryl were verbally ordered. Dr. taveras at bedside. Prior 

to administration of those medications, patient was found to clench right hand 

and urinate. Ativan was verbally ordered, but was cancelled as patient was 

immediately alert an oriented within moments.  Prior to the administration of 

ALL medications, symptoms improved.  fingerstick; 255.pt became alert and denied

 any complaints.  EKG: sinus tachycardia at  111 bpm. Shortly after, the patient

 started to complain of pruritis and patient was noted to have erythematous 

skin; benadryl 25mg iv given solumedrol 125mg IV given.  








pt remains without any complaints; vitals stable. will admit to [facility] for 

tachycardia and further monitoring. 


 





Disposition/Present on Arrival





- Present on Arrival


Any Indicators Present on Arrival: No


History of DVT/PE: No


History of Uncontrolled Diabetes: No


Urinary Catheter: No


History of Decub. Ulcer: No


History Surgical Site Infection Following: None





- Disposition


Have Diagnosis and Disposition been Completed?: Yes


Diagnosis: 


 Ulcer, Cellulitis, Tachycardia





Disposition: HOSPITALIZED


Disposition Time: 19:25


Patient Plan: Admission


Patient Problems: 


                             Current Active Problems











Problem Status Onset


 


Cellulitis Acute 


 


Tachycardia Acute 


 


Ulcer Acute 











Condition: FAIR

## 2018-11-10 RX ADMIN — CLOTRIMAZOLE AND BETAMETHASONE DIPROPIONATE SCH APPLIC: 10; .5 LOTION TOPICAL at 18:37

## 2018-11-10 RX ADMIN — NYSTATIN SCH APPLIC: 100000 POWDER TOPICAL at 18:27

## 2018-11-10 RX ADMIN — VANCOMYCIN HYDROCHLORIDE SCH MLS/HR: 1 INJECTION, POWDER, LYOPHILIZED, FOR SOLUTION INTRAVENOUS at 10:02

## 2018-11-10 RX ADMIN — CEFEPIME SCH MLS/HR: 1 INJECTION, SOLUTION INTRAVENOUS at 13:27

## 2018-11-10 RX ADMIN — VANCOMYCIN HYDROCHLORIDE SCH MLS/HR: 1 INJECTION, POWDER, LYOPHILIZED, FOR SOLUTION INTRAVENOUS at 21:40

## 2018-11-10 RX ADMIN — INSULIN HUMAN SCH UNITS: 100 INJECTION, SOLUTION PARENTERAL at 13:25

## 2018-11-10 RX ADMIN — CEFEPIME SCH MLS/HR: 1 INJECTION, SOLUTION INTRAVENOUS at 21:39

## 2018-11-10 RX ADMIN — MULTIPLE VITAMINS W/ MINERALS TAB SCH TAB: TAB at 10:02

## 2018-11-10 RX ADMIN — INSULIN HUMAN SCH UNITS: 100 INJECTION, SOLUTION PARENTERAL at 18:25

## 2018-11-10 RX ADMIN — CEFEPIME SCH: 1 INJECTION, SOLUTION INTRAVENOUS at 14:07

## 2018-11-10 RX ADMIN — INSULIN HUMAN SCH UNITS: 100 INJECTION, SOLUTION PARENTERAL at 21:47

## 2018-11-10 NOTE — CP.PCM.CON
History of Present Illness





- History of Present Illness


History of Present Illness: 


58 year old male with PMH of ight leg skin/skin structure infection with chronic

leg ulcers, growing MRSA and sensitive Klebsiella in 2016, Bilateral lower 

extremity skin and skin structure infection (Enterobacter, Klebsiella Oxytoca an

d Group B Strep, as well as MRSA) which was treated 9/2015; MRSA and Pseudomonas

cellulitis of left leg in Dec 2015, May and June 2016, and MRSA cellulitis in 

Sept. 2016, chronic lymphedema of the lower extremities, Morbid obesity with BMI

35, HTN, DM, history of Strep bacteremia, history of hernia surgery, Learning 

disability was brought in by his mother because of apparently having 

convulsions. Patient also has chronic lower extremity wounds and Infectious 

Diseases consult is requested to further evaluate and manage. The patient is 

seen at a wound center and gets an unna boot weekly. There is no note of fevers 

but there is note of some drainage from the right leg. Full ROS is difficult to 

obtain because of the patient's learning disability.





Review of Systems





- Review of Systems


All systems: reviewed and no additional remarkable complaints except (as per 

HPI)





Past Patient History





- Infectious Disease


Hx of Infectious Diseases: None





- Tetanus Immunizations


Tetanus Immunization: Unknown





- Past Medical History & Family History


Past Medical History?: Yes





- Past Social History


Smoking Status: Former Smoker





- CARDIAC


Hx Cardiac Disorders: Yes


Hx Congestive Heart Failure: Yes


Hx Hypertension: Yes





- PULMONARY


Hx Respiratory Disorders: No





- NEUROLOGICAL


Hx Neurological Disorder: Yes


Hx Seizures: Yes


Other/Comment: Epilepsy





- HEENT


Hx HEENT Problems: Yes


Other/Comment: wears glasses





- RENAL


Hx Chronic Kidney Disease: No





- ENDOCRINE/METABOLIC


Hx Diabetes Mellitus Type 2: Yes





- HEMATOLOGICAL/ONCOLOGICAL


Hx Blood Disorders: No


Other/Comment: blood infection





- INTEGUMENTARY


Hx Dermatological Problems: Yes


Other/Comment: multiple wounds on the rt leg, cellulitis in b/l extreminity. 

Left thigh cellulitis





- MUSCULOSKELETAL/RHEUMATOLOGICAL


Hx Musculoskeletal Disorders: Yes


Hx Falls: Yes





- GASTROINTESTINAL


Hx Gastrointestinal Disorders: Yes





- GENITOURINARY/GYNECOLOGICAL


Hx Genitourinary Disorders: No





- PSYCHIATRIC


Hx Psychophysiologic Disorder: Yes


Hx Substance Use: No


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





- SURGICAL HISTORY


Hx Amputation: No


Hx Appendectomy: No


Hx Cholecystectomy: No


Hx Gastric Bypass Surgery: No


Hx Hysterectomy: No


Hx Joint Replacement: No


Hx Kidney Transplant: No


Hx Liver Transplant: No


Hx Mastectomy: No


Hx Musculoskeletal Surgery: No


Hx Open Heart Surgery: No


Hx Orthopedic Surgery: No


Hx Splenectomy: No


Hx Valve Replacement: No





- ANESTHESIA


Hx Anesthesia: Yes


Hx Anesthesia Reactions: No


Hx Malignant Hyperthermia: No





Meds


Allergies/Adverse Reactions: 


                                    Allergies











Allergy/AdvReac Type Severity Reaction Status Date / Time


 


piperacillin Allergy  ANAPHYLAXIS Verified 11/09/18 23:26


 


tazobactam [From Zosyn] Allergy  ANAPHYLAXIS Verified 11/10/18 04:15














- Medications


Medications: 


                               Current Medications





Sodium Chloride (Sodium Chloride 0.9%)  500 mls @ 100 mls/hr IV .Q5H CYNDEE


Vancomycin HCl (Vancomycin 1gm)  1 gm in 250 mls @ 167 mls/hr IVPB Q12 CYNDEE; 

Protocol


Cefepime HCl (Maxipime 1gm)  1 gm in 100 mls @ 100 mls/hr IVPB Q8 CYNDEE; Protocol











Physical Exam





- Constitutional


Appears: No Acute Distress, Chronically Ill





- Head Exam


Head Exam: NORMAL INSPECTION





- Neck Exam


Neck exam: Negative for: Meningismus





- Respiratory Exam


Respiratory Exam: Decreased Breath Sounds





- Cardiovascular Exam


Cardiovascular Exam: +S1, +S2





- GI/Abdominal Exam


GI & Abdominal Exam: Soft.  absent: Tenderness





- Extremities Exam


Additional comments: 





both legs with dressings in place





Results





- Vital Signs


Recent Vital Signs: 


                                Last Vital Signs











Temp  98.2 F   11/09/18 21:25


 


Pulse  101 H  11/09/18 21:25


 


Resp  18   11/09/18 21:25


 


BP  138/67   11/09/18 21:25


 


Pulse Ox  95   11/09/18 21:25














- Labs


Result Diagrams: 


                                 11/09/18 19:50





                                 11/09/18 19:50


Labs: 


                         Laboratory Results - last 24 hr











  11/09/18 11/09/18 11/09/18





  19:50 19:50 19:50


 


WBC   9.9 


 


RBC   4.91 


 


Hgb   14.7 


 


Hct   43.3 


 


MCV   88.2 


 


MCH   29.9 


 


MCHC   33.9 


 


RDW   12.6 


 


Plt Count   269 


 


MPV   10.3 


 


Gran %   76.6 H 


 


Lymph % (Auto)   14.9 L 


 


Mono % (Auto)   7.3 H 


 


Eos % (Auto)   1.0 L 


 


Baso % (Auto)   0.2 


 


Gran #   7.55 H 


 


Lymph # (Auto)   1.5 


 


Mono # (Auto)   0.7 H 


 


Eos # (Auto)   0.1 


 


Baso # (Auto)   0.02 


 


PT   


 


INR   


 


APTT   


 


Sodium  136  


 


Potassium  4.5  


 


Chloride  99  


 


Carbon Dioxide  29  


 


Anion Gap  12  


 


BUN  10  


 


Creatinine  0.7 L  


 


Est GFR ( Amer)  > 60  


 


Est GFR (Non-Af Amer)  > 60  


 


POC Glucose (mg/dL)   


 


Random Glucose  330 H* D  


 


Calcium  9.7  


 


Total Bilirubin  0.8  


 


AST  23  


 


ALT  37  


 


Alkaline Phosphatase  121  


 


Lactate Dehydrogenase    419


 


Total Creatine Kinase    55


 


Troponin I    < 0.01


 


Total Protein  7.5  


 


Albumin  4.2  


 


Globulin  3.3  


 


Albumin/Globulin Ratio  1.3  














  11/09/18 11/09/18





  19:50 20:10


 


WBC  


 


RBC  


 


Hgb  


 


Hct  


 


MCV  


 


MCH  


 


MCHC  


 


RDW  


 


Plt Count  


 


MPV  


 


Gran %  


 


Lymph % (Auto)  


 


Mono % (Auto)  


 


Eos % (Auto)  


 


Baso % (Auto)  


 


Gran #  


 


Lymph # (Auto)  


 


Mono # (Auto)  


 


Eos # (Auto)  


 


Baso # (Auto)  


 


PT  11.8 


 


INR  1.03 


 


APTT  46.2 H 


 


Sodium  


 


Potassium  


 


Chloride  


 


Carbon Dioxide  


 


Anion Gap  


 


BUN  


 


Creatinine  


 


Est GFR ( Amer)  


 


Est GFR (Non-Af Amer)  


 


POC Glucose (mg/dL)   255 H


 


Random Glucose  


 


Calcium  


 


Total Bilirubin  


 


AST  


 


ALT  


 


Alkaline Phosphatase  


 


Lactate Dehydrogenase  


 


Total Creatine Kinase  


 


Troponin I  


 


Total Protein  


 


Albumin  


 


Globulin  


 


Albumin/Globulin Ratio  














Assessment & Plan





- Assessment and Plan (Free Text)


Plan: 





Assessment


consider right lower extremity infected chronic wounds


R/O seizure episode


history of sepsis due to right leg skin and skin structure infection associated 

with chronic leg ulcers, clinically improved and S/P treatment


history of MRSA and Klebsiella oxytoca right leg cellulitis


history of sepsis secondary to right leg skin/skin structure infection with 

chronic leg ulcers, growing MRSA and sensitive Klebsiella 


history of MRSA cellulitis Sept. 2016


history of Bilateral lower extremity skin and skin structure infection 

(Enterobacter, Klebsiella Oxytoca and Group B Strep, as well as MRSA) which was 

treated 9/2015; MRSA and Pseudomonas cellulitis of left leg in Dec 2015, May and

June 2016


chronic lymphedema of the lower extremities


Morbid obesity with BMI 35


HTN


DM


history of Strep bacteremia


history of hernia surgery


Learning disability





Plan


started Vancomycin and Cefepime pending blood, wound cx; follow up surgery 

evaluation and plans


follow up plan to check if the patient had seizure episode - may need Neuro 

evaluation


will monitor clinically


discussed with mother of patient

## 2018-11-10 NOTE — CARD
--------------- APPROVED REPORT --------------





Date of service: 11/09/2018



EKG Measurement

Heart [id number]

[id number]

[id number]

[id number]

[id number]



<Conclusion>

Sinus tachycardia

Nonspecific ST abnormality

Abnormal ECG

## 2018-11-10 NOTE — CP.PCM.CON
History of Present Illness





- History of Present Illness


History of Present Illness: 





General Surgery Consult Note for Dr. Marsahll





58 year old male, past medical history of chronic lower extremity lymphedema w/ 

ulcerations, MRSA infection, HTN, IDDM, CAD, CHF, and epilepsy, consulted for 

lower extremity wounds. Patient states since 2 days ago the left dorsal aspect 

of the foot draining pus and became tender. He has dressings in place. Patient 

has significant swelling of the lower extremities bilaterally. Patient is 

wheelchair bound and has difficulty elevating his legs for long periods of time.

Currently states he is not in any pain. Patient does not speak much. Denies 

fever, chills, nausea, vomiting, diarrhea, abdominal pain, SOB, chest pain, or 

urinary symptoms. 





PMH: see above


PSH: inguinal hernia repair 


FH: noncontributory


Soc: denies tobacco, alcohol, drugs


ALL: zosyn


Meds: see MAR





Podiatry: Dr. Ramirez





Review of Systems





- Constitutional


Constitutional: absent: Chills, Fever





- Cardiovascular


Cardiovascular: absent: Chest Pain, Dyspnea





- Respiratory


Respiratory: absent: Cough, Dyspnea





- Gastrointestinal


Gastrointestinal: absent: Abdominal Pain, Diarrhea, Nausea, Vomiting





- Genitourinary


Genitourinary: absent: Difficulty Urinating, Dysuria





- Musculoskeletal


Musculoskeletal: absent: Back Pain, Neck Pain





- Integumentary


Integumentary: Change in Nails, Changing Lesions, Dry Skin, New Lesions, Skin 

Pain, Swelling, Wounds





- Neurological


Neurological: absent: Dizziness, Headaches





Past Patient History





- Infectious Disease


Hx of Infectious Diseases: None





- Tetanus Immunizations


Tetanus Immunization: Unknown





- Past Medical History & Family History


Past Medical History?: Yes





- Past Social History


Smoking Status: Former Smoker





- CARDIAC


Hx Cardiac Disorders: Yes


Hx Angina: No


Hx Cardia Arrhythmia: Yes


Hx Circulatory Problems: No


Hx Congestive Heart Failure: Yes


Hx Heart Murmur: No


Hx Heart Transplant: No


Hx Hypercholesterolemia: Yes


Hx Hypertension: Yes


Hx Pacemaker: No


Hx Peripheral Edema: Yes


Hx Peripheral Vascular Disease: Yes





- PULMONARY


Hx Respiratory Disorders: No


Hx Asthma: No


Hx Bronchitis: No


Hx Chronic Obstructive Pulmonary Disease (COPD): No


Hx Emphysema: No


Hx Pneumonia: No


Hx Respiratory Aspiration: No


Hx Respiratory Tract Infection: No


Hx Sleep Apnea: No


Hx Tuberculosis: No





- NEUROLOGICAL


Hx Neurological Disorder: Yes


Hx Alzheimer's Disease: No


HX Cerebrovascular Accident: No


Hx Dementia: No


Hx Dizziness: No


Hx Meningitis: No


Hx Migraine: No


Hx Parkinson's Disease: No


Hx Seizures: Yes


Hx Transient Ischemic Attacks (TIA): No





- HEENT


Hx HEENT Problems: Yes (wears reading glasses)


Hx Blind: No


Hx Cataracts: No


Hx Deafness: No


Hx Difficulty Chewing: No


Hx Epistaxis: No


Hx Glaucoma: No


Hx Macular Degeneration: No





- RENAL


Hx Chronic Kidney Disease: No


Hx Dialysis: No


Hx Kidney Stones: No


Hx Neurogenic Bladder: No


Hx Pyelonephritis: No


Hx Renal (Kidney) Cancer: No


Hx Renal Failure: No





- ENDOCRINE/METABOLIC


Hx Endocrine Disorders: Yes


Hx Adrenal Cancer: No


Hx Diabetes Insipidus: No


Hx Diabetes Mellitus Type 1: No


Hx Diabetes Mellitus Type 2: Yes


Hx Hyperthyroidism: No


Hx Hypothyroidism: No


Hx Systemic Lupus Erythematosus: No





- HEMATOLOGICAL/ONCOLOGICAL


Hx Blood Disorders: No


Hx AIDS: No


Hx Anemia: No


Hx Cancer: No


Hx Chemotherapy: No


Hx Cirrhosis: No


Hx Hemophilia: No


Hx Hepatitis A: No


Hx Hepatitis B: No


Hx Hepatitis C: No


Hx Human Immunodeficiency Virus (HIV): No


Hx Metastesis: No


Hx Shingles: No


Hx Sickle Cell Disease: No


Hx Unexplained Bleeding: No





- INTEGUMENTARY


Hx Dermatological Problems: No


Hx Basil Cell: No


Hx Eczema: No


Hx Melanoma: No


Hx Psoriasis: No


Hx Squamous Cell: No





- MUSCULOSKELETAL/RHEUMATOLOGICAL


Hx Musculoskeletal Disorders: Yes


Hx Arthritis: No


Hx Back Pain: No


Hx Degenerative Joint Disease: No


Hx Falls: No


Hx Fractures: No


Hx Gout: No


Hx Herniated Disk: No


Hx Myasthenia Gravis: No


Hx Osteoarthritis: No


Hx Osteomyelitis: No


Hx Osteoporosis: No


Hx Rhabdomyolysis: No


Hx Spinal Stenosis: No


Hx Unsteady Gait: Yes





- GASTROINTESTINAL


Hx Gastrointestinal Disorders: Yes


Hx Colostomy: No


Hx Crohn's Disease: No


Hx Diverticulitis: No


Hx Gall Bladder Disease: No


Hx Gastroesophageal Reflux: Yes


Hx Ileostomy: No


Hx Liver Failure: No


Hx Pancreatitis: No


HX Swallowing Problems: No


Hx Ulcer: No





- GENITOURINARY/GYNECOLOGICAL


Hx Genitourinary Disorders: No


Hx Hematuria: No


Hx Incontinence: No


Hx Prostate Problems: No


Hx Sexually Transmitted Disorders: No


Hx Urinary Tract Infection: No





- PSYCHIATRIC


Hx Psychophysiologic Disorder: No


Hx Anxiety: No


Hx Bipolar Disorder: No


Hx Depression: No


Hx Emotional Abuse: No


Hx Hallucinations: No


Hx Panic Symptoms: No


Hx Paranoia: No


Hx Post Traumatic Stress Disorder: No


Hx Psychosis: No


Hx Physical Abuse: No


Hx Schizophrenia: No


Hx Sexual Abuse: No


Hx Substance Use: No





- SURGICAL HISTORY


Hx Surgeries: Yes


Hx Amputation: No


Hx Appendectomy: No


Hx Cardiac Catheterization: No


Hx Cholecystectomy: No


Hx Coronary Stent: No


Hx Gastric Bypass Surgery: No


Hx Hysterectomy: No


Hx Joint Replacement: No


Hx Kidney Transplant: No


Hx Liver Transplant: No


Hx Mastectomy: No


Hx Musculoskeletal Surgery: No


Hx Open Heart Surgery: No


Hx Orthopedic Surgery: No


Hx Splenectomy: No


Hx Valve Replacement: No





- ANESTHESIA


Hx Anesthesia: Yes


Hx Anesthesia Reactions: No


Hx Malignant Hyperthermia: No





Meds


Allergies/Adverse Reactions: 


                                    Allergies











Allergy/AdvReac Type Severity Reaction Status Date / Time


 


piperacillin Allergy  ANAPHYLAXIS Verified 11/09/18 23:26


 


tazobactam [From Zosyn] Allergy  ANAPHYLAXIS Verified 11/10/18 04:15














- Medications


Medications: 


                               Current Medications





Sodium Chloride (Sodium Chloride 0.9%)  500 mls @ 100 mls/hr IV .Q5H CYNDEE


   Last Admin: 11/09/18 23:04 Dose:  100 mls/hr


Vancomycin HCl (Vancomycin 1gm)  1 gm in 250 mls @ 167 mls/hr IVPB Q12 CYNDEE; 

Protocol


Cefepime HCl (Maxipime 1gm)  1 gm in 100 mls @ 100 mls/hr IVPB Q8 CYNDEE; Protocol


Multivitamins/Minerals (Therapeutic-M Tab)  1 tab PO 0800 CYNDEE











Physical Exam





- Constitutional


Appears: Well, Non-toxic, No Acute Distress





- Head Exam


Head Exam: ATRAUMATIC, NORMAL INSPECTION, NORMOCEPHALIC





- Eye Exam


Eye Exam: EOMI





- ENT Exam


ENT Exam: Mucous Membranes Moist





- Cardiovascular Exam


Cardiovascular Exam: REGULAR RHYTHM





- GI/Abdominal Exam


GI & Abdominal Exam: Normal Bowel Sounds, Soft.  absent: Tenderness





- Neurological Exam


Neurological exam: Alert





- Psychiatric Exam


Psychiatric exam: Flat Affect





- Skin


Skin Exam: Dry


Additional comments: 





lower extremity edema, chronic venous stasis dermatitis bilaterally 


dressings changed c/d/i





Results





- Vital Signs


Recent Vital Signs: 


                                Last Vital Signs











Temp  98.2 F   11/10/18 02:40


 


Pulse  94 H  11/10/18 02:54


 


Resp  18   11/10/18 02:54


 


BP  112/73   11/10/18 02:40


 


Pulse Ox  98   11/10/18 02:40














- Labs


Result Diagrams: 


                                 11/09/18 19:50





                                 11/09/18 19:50


Labs: 


                         Laboratory Results - last 24 hr











  11/09/18 11/09/18 11/09/18





  19:50 19:50 19:50


 


WBC   9.9 


 


RBC   4.91 


 


Hgb   14.7 


 


Hct   43.3 


 


MCV   88.2 


 


MCH   29.9 


 


MCHC   33.9 


 


RDW   12.6 


 


Plt Count   269 


 


MPV   10.3 


 


Gran %   76.6 H 


 


Lymph % (Auto)   14.9 L 


 


Mono % (Auto)   7.3 H 


 


Eos % (Auto)   1.0 L 


 


Baso % (Auto)   0.2 


 


Gran #   7.55 H 


 


Lymph # (Auto)   1.5 


 


Mono # (Auto)   0.7 H 


 


Eos # (Auto)   0.1 


 


Baso # (Auto)   0.02 


 


PT   


 


INR   


 


APTT   


 


Sodium  136  


 


Potassium  4.5  


 


Chloride  99  


 


Carbon Dioxide  29  


 


Anion Gap  12  


 


BUN  10  


 


Creatinine  0.7 L  


 


Est GFR ( Amer)  > 60  


 


Est GFR (Non-Af Amer)  > 60  


 


POC Glucose (mg/dL)   


 


Random Glucose  330 H* D  


 


Calcium  9.7  


 


Total Bilirubin  0.8  


 


AST  23  


 


ALT  37  


 


Alkaline Phosphatase  121  


 


Lactate Dehydrogenase    419


 


Total Creatine Kinase    55


 


Troponin I    < 0.01


 


Total Protein  7.5  


 


Albumin  4.2  


 


Globulin  3.3  


 


Albumin/Globulin Ratio  1.3  














  11/09/18 11/09/18





  19:50 20:10


 


WBC  


 


RBC  


 


Hgb  


 


Hct  


 


MCV  


 


MCH  


 


MCHC  


 


RDW  


 


Plt Count  


 


MPV  


 


Gran %  


 


Lymph % (Auto)  


 


Mono % (Auto)  


 


Eos % (Auto)  


 


Baso % (Auto)  


 


Gran #  


 


Lymph # (Auto)  


 


Mono # (Auto)  


 


Eos # (Auto)  


 


Baso # (Auto)  


 


PT  11.8 


 


INR  1.03 


 


APTT  46.2 H 


 


Sodium  


 


Potassium  


 


Chloride  


 


Carbon Dioxide  


 


Anion Gap  


 


BUN  


 


Creatinine  


 


Est GFR ( Amer)  


 


Est GFR (Non-Af Amer)  


 


POC Glucose (mg/dL)   255 H


 


Random Glucose  


 


Calcium  


 


Total Bilirubin  


 


AST  


 


ALT  


 


Alkaline Phosphatase  


 


Lactate Dehydrogenase  


 


Total Creatine Kinase  


 


Troponin I  


 


Total Protein  


 


Albumin  


 


Globulin  


 


Albumin/Globulin Ratio  














Assessment & Plan





- Assessment and Plan (Free Text)


Assessment: 





58M w/ chronic lower extremity lymphedema and venous stasis with ulcerations 


Plan: 





Elevation of the lower extremities to 30 degrees


Air mattress ordered 


Compression wrap of LE with ACE bandages and abdominal binders


Multivitamin to optimize nutritional status


Regular diet as tolerated


IV Antibiotics per ID 


No surgical intervention at this present time


Further recommendations per Podiatry


Further recommendations per Dr. Rolando Ramos PGY1

## 2018-11-10 NOTE — RAD
Date of service: 



11/09/2018



HISTORY:

 leg infection 



COMPARISON:

05/08/2018. 



FINDINGS:



LUNGS:

The lungs are well inflated.  There is mild pulmonary venous 

congestion. 



PLEURA:

No pleural effusions or pneumothorax. 



CARDIOVASCULAR:

There is mild cardiomegaly.  Atherosclerotic aortic arch 

calcifications are present. 



OSSEOUS STRUCTURES:

Within normal limits for the patient's age.



VISUALIZED UPPER ABDOMEN:

Normal.



OTHER FINDINGS:

None.



IMPRESSION:

No active pulmonary disease.



Mild cardiomegaly and pulmonary venous congestion.

## 2018-11-10 NOTE — HP
DATE OF EXAM:  11/10/2018



CHIEF COMPLAINT AND HISTORY OF PRESENT ILLNESS:  This is a 58-year-old male

who is coming in to the hospital.  He was having worsening of his infection

in his left foot.  The patient says that 2 days ago, he started developing

pain as well as discharge.  He has been wearing his [place] boots.  He has a

history of chronic leg ulcers as well as chronic skin changes from his

lymphedema.  The patient is able to ambulate.  He was brought in for

further management.  The patient has no complaints of any headaches or

dizziness.  The patient is mute and so he is not able to give a full review

of symptoms.  He is able to get his needs met and can communicate his

mother at the bedside.  I was able to talk to her and get information,

limited review of symptoms.



PAST MEDICAL HISTORY:

1.  Morbid obesity.

2.  Chronic cellulitis of the legs with skin changes.

3.  Atrial fibrillation, on anticoagulation.

4.  Hypertension.

5.  Dyslipidemia.

6.  Nephrolithiasis.



SOCIAL HISTORY:  The patient does not smoke, drinks or use drugs.  He lives

with his mother.  He does have an aide at home.



FAMILY HISTORY:  Noncontributory.



MEDICATIONS:  Has been reviewed on the MAR.



ALLERGIES:  PENICILLIN AND TAZOBACTAM.



PHYSICAL EXAMINATION:

VITAL SIGNS:  Temperature is 97.9, pulse of 90, blood pressure 116/60,

respirations 20, O2 saturation 93%.  Height is 5 feet 7 inches, weight is

310 pounds.  BMI is 48.6.

GENERAL:  The patient lying in bed, uncomfortable, and in no acute

distress.

HEENT:  Atraumatic and normocephalic.  Anicteric sclerae.  Moist mucosa. 

Pink conjunctivae.  No oral lesions.

NECK:  No JVD, anterior and posterior adenopathy, thyromegaly, or bruits.

CARDIOVASCULAR:  S1 and S2 regular.  No murmur, rubs, or gallop.

LUNGS:  Clear to auscultation bilaterally.  No wheezes, rales, or rhonchi.

ABDOMEN:  Bowel sounds are positive.  Soft, nontender and nondistended.  No

hepatosplenomegaly.  No rebound and no guarding.

EXTREMITIES:  In the lower extremities there are chronic skin changes. 

There is an area in the base of the left toe that is open and may have an

infection deep within the skin area.

NEUROLOGIC:  No facial asymmetry.  Tongue is midline.  No uvula deviation. 

Power is 5/5 upper extremity and lower extremity.  Sensation intact in

upper extremity and lower extremity.  He has normal affect.

GENITOURINARY:  No CVA tenderness.

VASCULAR:  2+ pulses in the carotid pulses and pedal pulses.

SKIN:  No erythema or nodules.

SPINE:  Shows normal curvature.



LABORATORY DATA:  White count of 9.9, hemoglobin is 14.7.  INR is 1.03. 

Sodium is 136, potassium is 4.5, creatinine is 0.7.  Troponin is 0.01. 

Albumin is 4.2.



DIAGNOSTICS:  An EKG shows sinus tachycardia, nonspecific ST changes., QTc

is 481.



ASSESSMENT:

1.  Left foot infection.

2.  Diabetes type 2.

3.  Atrial fibrillation, on Eliquis.

4.  Hypertension.

5.  Dyslipidemia.

6.  Obesity with body mass index of 48.



PLAN:  The patient is admitted to the hospital because of the wound

infection.  He will need to be seen by Surgery and by Podiatry.  The

patient is on IV fluids.  I will discontinue the patient's IV fluids at

this point.  He is going to continue on his vitamin C.  He is on vancomycin

for antibiotics by ID.  The patient will need to continue his ramipril for

his hypertension.  He is on apixaban, this will be continued as well.  He

is on Lasix daily as well as Lipitor for his dyslipidemia.  I will increase

his Lipitor.  The patient is on Neurontin for his neuropathy.  I will place

him on insulin sliding scale.  I will continue to follow closely.  I will

discontinue the telemetry, place him on diabetic diet.







__________________________________________

Delbret Corona MD





DD:  11/10/2018 9:54:36

DT:  11/10/2018 11:50:52

Job # [id number]

## 2018-11-10 NOTE — CP.PCM.CON
History of Present Illness





- History of Present Illness


History of Present Illness: 





Podiatry Consult Note for attending Dr. Ramirez





58 year old male, PMH of chronic lower extremity lymphedema w/ ulcerations, MRSA

infection, HTN, IDDM, CAD, CHF, and epilepsy, nohemi nd evaluated for lower 

extremity stasis edema and ulcerations. patient is mentally callenging and most 

of his history obtained through his mother. mother states that her son is living

with aid and she lives in the same building in another apartment. She states 

that he is following up with Dr. Ramirez for his b/l leg edema which started 15 

years ago. She states that last time he swe Dr. Ramirez was 3 months ago. She 

states that his wound care nurse comes once every week to change his UNNA boot. 

She states that 2 days ago the nurse changed it but later it was noticed  bad 

smell and pus coming from his left foot. Mother states that her son banged his 

left foot yesterday. Patient mother states that currently he doesn't have any 

pain. She denies fever, chills, nausea, vomiting, diarrhea or SOB. She denies 

any other pedal complaint at this time.





PMH: chronic lower extremity lymphedema w/ ulcerations, MRSA infection, HTN, 

IDDM, CAD, CHF, and epilepsy.


PSH: inguinal hernia repair. B/l Varicose vein surgery.


Allergies: Zosyn


Social Hx: denies tobacco, EtOH or illicit drugs use





Review of Systems





- Review of Systems


Review of Systems: 





As per HPI





- Constitutional


Constitutional: As Per HPI





Past Patient History





- Infectious Disease


Hx of Infectious Diseases: None





- Tetanus Immunizations


Tetanus Immunization: Unknown





- Past Medical History & Family History


Past Medical History?: Yes





- Past Social History


Smoking Status: Former Smoker





- CARDIAC


Hx Cardiac Disorders: Yes


Hx Angina: No


Hx Cardia Arrhythmia: Yes


Hx Circulatory Problems: No


Hx Congestive Heart Failure: Yes


Hx Heart Murmur: No


Hx Heart Transplant: No


Hx Hypercholesterolemia: Yes


Hx Hypertension: Yes


Hx Pacemaker: No


Hx Peripheral Edema: Yes


Hx Peripheral Vascular Disease: Yes





- PULMONARY


Hx Respiratory Disorders: No


Hx Asthma: No


Hx Bronchitis: No


Hx Chronic Obstructive Pulmonary Disease (COPD): No


Hx Emphysema: No


Hx Pneumonia: No


Hx Respiratory Aspiration: No


Hx Respiratory Tract Infection: No


Hx Sleep Apnea: No


Hx Tuberculosis: No





- NEUROLOGICAL


Hx Neurological Disorder: Yes


Hx Alzheimer's Disease: No


HX Cerebrovascular Accident: No


Hx Dementia: No


Hx Dizziness: No


Hx Meningitis: No


Hx Migraine: No


Hx Parkinson's Disease: No


Hx Seizures: Yes


Hx Transient Ischemic Attacks (TIA): No





- HEENT


Hx HEENT Problems: Yes (wears reading glasses)


Hx Blind: No


Hx Cataracts: No


Hx Deafness: No


Hx Difficulty Chewing: No


Hx Epistaxis: No


Hx Glaucoma: No


Hx Macular Degeneration: No





- RENAL


Hx Chronic Kidney Disease: No


Hx Dialysis: No


Hx Kidney Stones: No


Hx Neurogenic Bladder: No


Hx Pyelonephritis: No


Hx Renal (Kidney) Cancer: No


Hx Renal Failure: No





- ENDOCRINE/METABOLIC


Hx Endocrine Disorders: Yes


Hx Adrenal Cancer: No


Hx Diabetes Insipidus: No


Hx Diabetes Mellitus Type 1: No


Hx Diabetes Mellitus Type 2: Yes


Hx Hyperthyroidism: No


Hx Hypothyroidism: No


Hx Systemic Lupus Erythematosus: No





- HEMATOLOGICAL/ONCOLOGICAL


Hx Blood Disorders: No


Hx AIDS: No


Hx Anemia: No


Hx Cancer: No


Hx Chemotherapy: No


Hx Cirrhosis: No


Hx Hemophilia: No


Hx Hepatitis A: No


Hx Hepatitis B: No


Hx Hepatitis C: No


Hx Human Immunodeficiency Virus (HIV): No


Hx Metastesis: No


Hx Shingles: No


Hx Sickle Cell Disease: No


Hx Unexplained Bleeding: No





- INTEGUMENTARY


Hx Dermatological Problems: No


Hx Basil Cell: No


Hx Eczema: No


Hx Melanoma: No


Hx Psoriasis: No


Hx Squamous Cell: No





- MUSCULOSKELETAL/RHEUMATOLOGICAL


Hx Musculoskeletal Disorders: Yes


Hx Arthritis: No


Hx Back Pain: No


Hx Degenerative Joint Disease: No


Hx Falls: No


Hx Fractures: No


Hx Gout: No


Hx Herniated Disk: No


Hx Myasthenia Gravis: No


Hx Osteoarthritis: No


Hx Osteomyelitis: No


Hx Osteoporosis: No


Hx Rhabdomyolysis: No


Hx Spinal Stenosis: No


Hx Unsteady Gait: Yes





- GASTROINTESTINAL


Hx Gastrointestinal Disorders: Yes


Hx Colostomy: No


Hx Crohn's Disease: No


Hx Diverticulitis: No


Hx Gall Bladder Disease: No


Hx Gastroesophageal Reflux: Yes


Hx Ileostomy: No


Hx Liver Failure: No


Hx Pancreatitis: No


HX Swallowing Problems: No


Hx Ulcer: No





- GENITOURINARY/GYNECOLOGICAL


Hx Genitourinary Disorders: No


Hx Hematuria: No


Hx Incontinence: No


Hx Prostate Problems: No


Hx Sexually Transmitted Disorders: No


Hx Urinary Tract Infection: No





- PSYCHIATRIC


Hx Psychophysiologic Disorder: No


Hx Anxiety: No


Hx Bipolar Disorder: No


Hx Depression: No


Hx Emotional Abuse: No


Hx Hallucinations: No


Hx Panic Symptoms: No


Hx Paranoia: No


Hx Post Traumatic Stress Disorder: No


Hx Psychosis: No


Hx Physical Abuse: No


Hx Schizophrenia: No


Hx Sexual Abuse: No


Hx Substance Use: No





- SURGICAL HISTORY


Hx Surgeries: Yes


Hx Amputation: No


Hx Appendectomy: No


Hx Cardiac Catheterization: No


Hx Cholecystectomy: No


Hx Coronary Stent: No


Hx Gastric Bypass Surgery: No


Hx Hysterectomy: No


Hx Joint Replacement: No


Hx Kidney Transplant: No


Hx Liver Transplant: No


Hx Mastectomy: No


Hx Musculoskeletal Surgery: No


Hx Open Heart Surgery: No


Hx Orthopedic Surgery: No


Hx Splenectomy: No


Hx Valve Replacement: No





- ANESTHESIA


Hx Anesthesia: Yes


Hx Anesthesia Reactions: No


Hx Malignant Hyperthermia: No





Meds


Allergies/Adverse Reactions: 


                                    Allergies











Allergy/AdvReac Type Severity Reaction Status Date / Time


 


piperacillin Allergy  ANAPHYLAXIS Verified 11/09/18 23:26


 


tazobactam [From Zosyn] Allergy  ANAPHYLAXIS Verified 11/10/18 04:15














- Medications


Medications: 


                               Current Medications





Apixaban (Eliquis)  2.5 mg PO BID [facility]; Protocol


   Last Admin: 11/10/18 10:10 Dose:  2.5 mg


Atorvastatin Calcium (Lipitor)  40 mg PO HS [facility]


Furosemide (Lasix)  20 mg PO DAILY [facility]


   Last Admin: 11/10/18 10:07 Dose:  20 mg


Gabapentin (Neurontin)  400 mg PO TID [facility]; Protocol


   Last Admin: 11/10/18 10:07 Dose:  400 mg


Vancomycin HCl (Vancomycin 1gm)  1 gm in 250 mls @ 167 mls/hr IVPB Q12 [facility]; 

Protocol


   Last Admin: 11/10/18 10:02 Dose:  167 mls/hr


Cefepime HCl (Maxipime 1gm)  1 gm in 100 mls @ 100 mls/hr IVPB Q8 [facility]; Protocol


Insulin Human Regular (Humulin R Med)  0 units SC ACBD [facility]; Protocol


Multivitamins/Minerals (Therapeutic-M Tab)  1 tab PO 0800 [facility]


   Last Admin: 11/10/18 10:02 Dose:  1 tab


Ramipril (Altace)  10 mg PO DAILY [facility]


   Last Admin: 11/10/18 10:11 Dose:  10 mg











Physical Exam





- Constitutional


Appears: Well, Non-toxic, No Acute Distress





- Head Exam


Head Exam: ATRAUMATIC, NORMOCEPHALIC





- Extremities Exam


Additional comments: 





Left Lower extremity focused exam: (UNNA boot left intact to the R LE)





Vasc: DP 2/4, PT pulse is non palpable 2ry to edema . Temperature gradient warm 

to warm from proximal to distal. Cap refill < 3 sec to all digits. Diffuse non 

pitting pedal edema noted from tibial tuberosity to digits





Neuro: Protective and gross sensations are grossly intact





Derm: Diffuse elephantiasis skin changes with lichnification and wart like skin 

lesions secondary scaling noted to the foot and leg up to the level of the 

tibial tuberosity. a superficial ulceration (1.5cm X 1.2cm) noted at the level 

of the medial malleolous that is covered with dry serous drainage. No purulence,

Mild malodor, no fluctuance. No clinical suspicion for infection. Slight 

erythema noted.





Ortho: No tenderness to posterior calf. No tenderness to palpation. Muscle power

intact 5/5 in all groups.




















- Neurological Exam


Neurological exam: Alert, Oriented x3





Results





- Vital Signs


Recent Vital Signs: 


                                Last Vital Signs











Temp  97.9 F   11/10/18 05:54


 


Pulse  98 H  11/10/18 06:00


 


Resp  20   11/10/18 05:54


 


BP  130/80   11/10/18 10:07


 


Pulse Ox  93 L  11/10/18 05:54














- Labs


Result Diagrams: 


                                 11/09/18 19:50





                                 11/09/18 19:50


Labs: 


                         Laboratory Results - last 24 hr











  11/09/18 11/09/18 11/09/18





  19:50 19:50 19:50


 


WBC   9.9 


 


RBC   4.91 


 


Hgb   14.7 


 


Hct   43.3 


 


MCV   88.2 


 


MCH   29.9 


 


MCHC   33.9 


 


RDW   12.6 


 


Plt Count   269 


 


MPV   10.3 


 


Gran %   76.6 H 


 


Lymph % (Auto)   14.9 L 


 


Mono % (Auto)   7.3 H 


 


Eos % (Auto)   1.0 L 


 


Baso % (Auto)   0.2 


 


Gran #   7.55 H 


 


Lymph # (Auto)   1.5 


 


Mono # (Auto)   0.7 H 


 


Eos # (Auto)   0.1 


 


Baso # (Auto)   0.02 


 


PT   


 


INR   


 


APTT   


 


Sodium  136  


 


Potassium  4.5  


 


Chloride  99  


 


Carbon Dioxide  29  


 


Anion Gap  12  


 


BUN  10  


 


Creatinine  0.7 L  


 


Est GFR ( Amer)  > 60  


 


Est GFR (Non-Af Amer)  > 60  


 


POC Glucose (mg/dL)   


 


Random Glucose  330 H* D  


 


Calcium  9.7  


 


Total Bilirubin  0.8  


 


AST  23  


 


ALT  37  


 


Alkaline Phosphatase  121  


 


Lactate Dehydrogenase    419


 


Total Creatine Kinase    55


 


Troponin I    < 0.01


 


Total Protein  7.5  


 


Albumin  4.2  


 


Globulin  3.3  


 


Albumin/Globulin Ratio  1.3  














  11/09/18 11/09/18





  19:50 20:10


 


WBC  


 


RBC  


 


Hgb  


 


Hct  


 


MCV  


 


MCH  


 


MCHC  


 


RDW  


 


Plt Count  


 


MPV  


 


Gran %  


 


Lymph % (Auto)  


 


Mono % (Auto)  


 


Eos % (Auto)  


 


Baso % (Auto)  


 


Gran #  


 


Lymph # (Auto)  


 


Mono # (Auto)  


 


Eos # (Auto)  


 


Baso # (Auto)  


 


PT  11.8 


 


INR  1.03 


 


APTT  46.2 H 


 


Sodium  


 


Potassium  


 


Chloride  


 


Carbon Dioxide  


 


Anion Gap  


 


BUN  


 


Creatinine  


 


Est GFR ( Amer)  


 


Est GFR (Non-Af Amer)  


 


POC Glucose (mg/dL)   255 H


 


Random Glucose  


 


Calcium  


 


Total Bilirubin  


 


AST  


 


ALT  


 


Alkaline Phosphatase  


 


Lactate Dehydrogenase  


 


Total Creatine Kinase  


 


Troponin I  


 


Total Protein  


 


Albumin  


 


Globulin  


 


Albumin/Globulin Ratio  














Assessment & Plan





- Assessment and Plan (Free Text)


Assessment: 





59 y/o diabetic male with  bilateral lower extremity lymphedema,  Left leg ce

llulitis and stasis ulceration











Plan: 








Pt seen and evaluated at bedside


Discussed plan with attending Dr. Ramirez


Labs and vitals reviewed- afebrile, WBCs 9.9


ID on board: Recommendations appreciated


Lotrisone lotion ordered BID


ordered Nystatin powder between folds of legs BID


DSD and ACE compression dressings to the L LE


Podiatry will follow up the patient while in house








- Date & Time


Date: 11/10/18


Time: 11:48

## 2018-11-11 RX ADMIN — CEFEPIME SCH MLS/HR: 1 INJECTION, SOLUTION INTRAVENOUS at 14:59

## 2018-11-11 RX ADMIN — INSULIN HUMAN SCH: 100 INJECTION, SOLUTION PARENTERAL at 21:49

## 2018-11-11 RX ADMIN — CLOTRIMAZOLE AND BETAMETHASONE DIPROPIONATE SCH APPLIC: 10; .5 LOTION TOPICAL at 09:37

## 2018-11-11 RX ADMIN — NYSTATIN SCH APPLIC: 100000 POWDER TOPICAL at 09:37

## 2018-11-11 RX ADMIN — INSULIN HUMAN SCH UNITS: 100 INJECTION, SOLUTION PARENTERAL at 17:41

## 2018-11-11 RX ADMIN — INSULIN HUMAN SCH UNITS: 100 INJECTION, SOLUTION PARENTERAL at 11:52

## 2018-11-11 RX ADMIN — CEFEPIME SCH MLS/HR: 1 INJECTION, SOLUTION INTRAVENOUS at 21:46

## 2018-11-11 RX ADMIN — MULTIPLE VITAMINS W/ MINERALS TAB SCH TAB: TAB at 08:21

## 2018-11-11 RX ADMIN — VANCOMYCIN HYDROCHLORIDE SCH MLS/HR: 1 INJECTION, POWDER, LYOPHILIZED, FOR SOLUTION INTRAVENOUS at 09:36

## 2018-11-11 RX ADMIN — CEFEPIME SCH MLS/HR: 1 INJECTION, SOLUTION INTRAVENOUS at 05:43

## 2018-11-11 RX ADMIN — INSULIN HUMAN SCH UNITS: 100 INJECTION, SOLUTION PARENTERAL at 08:21

## 2018-11-11 RX ADMIN — VANCOMYCIN HYDROCHLORIDE SCH MLS/HR: 1 INJECTION, POWDER, LYOPHILIZED, FOR SOLUTION INTRAVENOUS at 21:47

## 2018-11-11 RX ADMIN — NYSTATIN SCH APPLIC: 100000 POWDER TOPICAL at 17:44

## 2018-11-11 RX ADMIN — CLOTRIMAZOLE AND BETAMETHASONE DIPROPIONATE SCH APPLIC: 10; .5 LOTION TOPICAL at 17:44

## 2018-11-11 NOTE — CP.PCM.PN
Subjective





- Date & Time of Evaluation


Date of Evaluation: 11/11/18


Time of Evaluation: 10:20





- Subjective


Subjective: 





Comfortable in bed, no fevers, not in distress.





Objective





- Vital Signs/Intake and Output


Vital Signs (last 24 hours): 


                                        











Temp Pulse Resp BP Pulse Ox


 


 97.9 F   89   20   130/80   93 L


 


 11/10/18 05:54  11/10/18 10:00  11/10/18 05:54  11/10/18 10:07  11/10/18 05:54








Intake and Output: 


                                        











 11/10/18 11/10/18





 06:59 18:59


 


Intake Total 500 


 


Output Total 850 


 


Balance -350 














- Medications


Medications: 


                               Current Medications





Apixaban (Eliquis)  2.5 mg PO BID [facility]; Protocol


   Last Admin: 11/10/18 10:10 Dose:  2.5 mg


Atorvastatin Calcium (Lipitor)  40 mg PO HS [facility]


Betamethasone/Clotrimazole (Lotrisone)  1 ml TOP BID [facility]


Furosemide (Lasix)  20 mg PO DAILY [facility]


   Last Admin: 11/10/18 10:07 Dose:  20 mg


Gabapentin (Neurontin)  400 mg PO TID [facility]; Protocol


   Last Admin: 11/10/18 10:07 Dose:  400 mg


Vancomycin HCl (Vancomycin 1gm)  1 gm in 250 mls @ 167 mls/hr IVPB Q12 [facility]; 

Protocol


   Last Admin: 11/10/18 10:02 Dose:  167 mls/hr


Cefepime HCl (Maxipime 1gm)  1 gm in 100 mls @ 100 mls/hr IVPB Q8 [facility]; Protocol


Insulin Human Regular (Humulin R Med)  0 units SC ACBD [facility]; Protocol


Multivitamins/Minerals (Therapeutic-M Tab)  1 tab PO 0800 [facility]


   Last Admin: 11/10/18 10:02 Dose:  1 tab


Nystatin (Nystop Topical Powder)  1 gm TOP BID [facility]


Ramipril (Altace)  10 mg PO DAILY [facility]


   Last Admin: 11/10/18 10:11 Dose:  10 mg











- Labs


Labs: 


                                        





                                 11/09/18 19:50 





                                 11/09/18 19:50 





                                        











PT  11.8 SECONDS (9.4-12.5)   11/09/18  19:50    


 


INR  1.03   11/09/18  19:50    


 


APTT  46.2 Seconds (25.1-36.5)  H  11/09/18  19:50    














- Constitutional


Appears: Chronically Ill





- Head Exam


Head Exam: NORMAL INSPECTION





- Neck Exam


Neck Exam: absent: Meningismus





- Respiratory Exam


Respiratory Exam: Decreased Breath Sounds





- Cardiovascular Exam


Cardiovascular Exam: +S1, +S2





- GI/Abdominal Exam


GI & Abdominal Exam: Soft.  absent: Tenderness





- Extremities Exam


Additional comments: 





both lower extremities with dressings in place





Assessment and Plan





- Assessment and Plan (Free Text)


Plan: 








Assessment


consider right lower extremity infected chronic wounds


R/O seizure episode


history of sepsis due to right leg skin and skin structure infection associated 

with chronic leg ulcers, clinically improved and S/P treatment


history of MRSA and Klebsiella oxytoca right leg cellulitis


history of sepsis secondary to right leg skin/skin structure infection with 

chronic leg ulcers, growing MRSA and sensitive Klebsiella 


history of MRSA cellulitis Sept. 2016


history of Bilateral lower extremity skin and skin structure infection 

(Enterobacter, Klebsiella Oxytoca and Group B Strep, as well as MRSA) which was 

treated 9/2015; MRSA and Pseudomonas cellulitis of left leg in Dec 2015, May and

June 2016


chronic lymphedema of the lower extremities


Morbid obesity with BMI 35


HTN


DM


history of Strep bacteremia


history of hernia surgery


Learning disability





Plan


continue Vancomycin and Cefepime (patient is tolerating Cefepime without rash, 

no respiratory distress, no hives) pending final blood, wound cx results; follow

up surgery evaluation and plans


follow up plan to check if the patient had seizure episode - may need Neuro 

evaluation


will continue to monitor clinically

## 2018-11-11 NOTE — PN
DATE:  11/11/2018



SUBJECTIVE:  The patient has no complaints of any chest pain.  No shortness

of breath.  No headaches or dizziness.



PHYSICAL EXAMINATION:

VITAL SIGNS:  Temperature is 98, pulse is 73, blood pressure 130/80 and

respirations 20.

GENERAL:  The patient is lying in bed, flat and comfortable.

HEENT:  No oral lesion.  Anicteric sclerae.  Moist mucosa.

NECK:  No JVD, adenopathy or thyromegaly.

CARDIOVASCULAR:  S1 and S2, regular.  No murmurs, rubs, or gallops.

LUNGS:  Clear to auscultation bilaterally.  No wheeze, rales, or rhonchi.

ABDOMEN:  Bowel sounds are positive, soft, nontender and nondistended.

EXTREMITIES:  No cyanosis, clubbing or edema.



LABORATORY DATA:  White count 9.9 and hemoglobin 14.7.  Creatinine 0.7.



This is labs from _____.



ASSESSMENT:

1.  Left foot infection.

2.  Diabetes type 2.

3.  Atrial fibrillation, on Eliquis.

4.  Hypertension.

5.  Dyslipidemia.

6.  Obesity with body mass index of 48.

7.  PENICILLIN ALLERGIES.



PLAN:  The patient is getting IV antibiotics.  He is on Altace for his

hypertension.  He is going to continue with Lasix daily.  He is on Lipitor

for dyslipidemia.  The patient is on Neurontin for neuropathy.  The patient

is going to be on vancomycin.  He is on a heart healthy diet.  He has been

followed by ID, Surgery and Podiatry.







__________________________________________

Delbert Corona MD





DD:  11/11/2018 10:35:30

DT:  11/11/2018 12:05:06

Job # [id number]

## 2018-11-11 NOTE — CP.PCM.PN
Subjective





- Date & Time of Evaluation


Date of Evaluation: 11/11/18


Time of Evaluation: 11:51





- Subjective


Subjective: 





Podiatry progress Note for attending Dr. Ramirez





58 year old male patient  seen and evaluated at the bedside for lower extremity 

stasis edema and ulcerations. patient is mentally challenging and most of his 

history obtained through his medical chart. Patient states that he didn't have 

any pain at his left leg since yesterday. Patient denies any overnight fever, 

chills, nausea, vomiting, diarrhea or SOB. He denies any other pedal complaint 

at this time.











Objective





- Vital Signs/Intake and Output


Vital Signs (last 24 hours): 


                                        











Temp Pulse Resp BP Pulse Ox


 


 98.0 F   73   20   130/80   97 


 


 11/11/18 06:00  11/11/18 06:00  11/11/18 06:00  11/11/18 09:36  11/11/18 06:00








Intake and Output: 


                                        











 11/11/18 11/11/18





 06:59 18:59


 


Intake Total 2290 


 


Output Total 1100 


 


Balance 1190 














- Medications


Medications: 


                               Current Medications





Apixaban (Eliquis)  2.5 mg PO BID [facility]; Protocol


   Last Admin: 11/11/18 09:37 Dose:  2.5 mg


Atorvastatin Calcium (Lipitor)  40 mg PO HS [facility]


   Last Admin: 11/10/18 21:41 Dose:  40 mg


Betamethasone/Clotrimazole (Lotrisone)  1 ml TOP BID [facility]


   Last Admin: 11/11/18 09:37 Dose:  1 applic


Furosemide (Lasix)  20 mg PO DAILY [facility]


   Last Admin: 11/11/18 09:36 Dose:  20 mg


Gabapentin (Neurontin)  400 mg PO TID [facility]; Protocol


   Last Admin: 11/11/18 09:37 Dose:  400 mg


Vancomycin HCl (Vancomycin 1gm)  1 gm in 250 mls @ 167 mls/hr IVPB Q12 [facility]; 

Protocol


   Last Admin: 11/11/18 09:36 Dose:  167 mls/hr


Cefepime HCl (Maxipime 1gm)  1 gm in 100 mls @ 100 mls/hr IVPB Q8 [facility]; Protocol


   Last Admin: 11/11/18 05:43 Dose:  100 mls/hr


Insulin Human Regular (Humulin R Med)  0 units SC ACHS [facility]; Protocol


   Last Admin: 11/11/18 08:21 Dose:  10 units


Multivitamins/Minerals (Therapeutic-M Tab)  1 tab PO 0800 [facility]


   Last Admin: 11/11/18 08:21 Dose:  1 tab


Nystatin (Nystop Topical Powder)  1 gm TOP BID [facility]


   Last Admin: 11/11/18 09:37 Dose:  1 applic


Ramipril (Altace)  10 mg PO DAILY [facility]


   Last Admin: 11/11/18 09:37 Dose:  10 mg











- Labs


Labs: 


                                        





                                 11/09/18 19:50 





                                 11/09/18 19:50 





                                        











PT  11.8 SECONDS (9.4-12.5)   11/09/18  19:50    


 


INR  1.03   11/09/18  19:50    


 


APTT  46.2 Seconds (25.1-36.5)  H  11/09/18  19:50    














- Constitutional


Appears: Well, Non-toxic, No Acute Distress





- Head Exam


Head Exam: ATRAUMATIC, NORMOCEPHALIC





- Extremities Exam


Additional comments: 








Left Lower extremity focused exam: (UNNA boot left intact to the R LE)





Vasc: DP 2/4, PT pulse is non palpable 2ry to edema . Temperature gradient warm 

to warm from proximal to distal. Cap refill < 3 sec to all digits. Diffuse non 

pitting pedal edema noted from tibial tuberosity to digits





Neuro: Protective and gross sensations are grossly intact





Derm: Diffuse elephantiasis skin changes with lichnification and wart like skin 

lesions secondary scaling noted to the foot and leg up to the level of the 

tibial tuberosity. a superficial ulceration (1.5cm X 1.2cm) noted at the level 

of the medial malleolous that is covered with dry serous drainage. No purulence,

Mild malodor, no fluctuance. No clinical suspicion for infection. Slight 

erythema noted.





MSK: No tenderness to posterior calf. No tenderness to palpation. Muscle power 

intact 5/5 in all groups.


























- Neurological Exam


Neurological Exam: Alert, Awake





Assessment and Plan





- Assessment and Plan (Free Text)


Assessment: 








59 y/o diabetic male with  bilateral lower extremity lymphedema,  Left leg 

cellulitis and stasis ulceration














Plan: 








Pt seen and evaluated at bedside


Discussed plan with attending Dr. Ramirez


Labs and vitals reviewed- afebrile, WBCs 9.9 (11/9)


ID on board: Recommendations appreciated


Lotrisone lotion applied to the LLE.


Nystatin powder applied between folds of legs and behind the left knee


No dressing applied to the LLE


Podiatry will follow up the patient while in house

## 2018-11-12 RX ADMIN — INSULIN HUMAN SCH UNITS: 100 INJECTION, SOLUTION PARENTERAL at 18:25

## 2018-11-12 RX ADMIN — INSULIN HUMAN SCH UNITS: 100 INJECTION, SOLUTION PARENTERAL at 09:57

## 2018-11-12 RX ADMIN — CEFEPIME SCH MLS/HR: 1 INJECTION, SOLUTION INTRAVENOUS at 15:41

## 2018-11-12 RX ADMIN — CLOTRIMAZOLE AND BETAMETHASONE DIPROPIONATE SCH: 10; .5 LOTION TOPICAL at 18:27

## 2018-11-12 RX ADMIN — CEFEPIME SCH MLS/HR: 1 INJECTION, SOLUTION INTRAVENOUS at 21:39

## 2018-11-12 RX ADMIN — MULTIPLE VITAMINS W/ MINERALS TAB SCH TAB: TAB at 09:58

## 2018-11-12 RX ADMIN — CLOTRIMAZOLE AND BETAMETHASONE DIPROPIONATE SCH APPLIC: 10; .5 LOTION TOPICAL at 10:05

## 2018-11-12 RX ADMIN — NYSTATIN SCH APPLIC: 100000 POWDER TOPICAL at 10:04

## 2018-11-12 RX ADMIN — NYSTATIN SCH APPLIC: 100000 POWDER TOPICAL at 18:27

## 2018-11-12 RX ADMIN — VANCOMYCIN HYDROCHLORIDE SCH MLS/HR: 1 INJECTION, POWDER, LYOPHILIZED, FOR SOLUTION INTRAVENOUS at 09:58

## 2018-11-12 RX ADMIN — INSULIN HUMAN SCH UNITS: 100 INJECTION, SOLUTION PARENTERAL at 22:55

## 2018-11-12 RX ADMIN — VANCOMYCIN HYDROCHLORIDE SCH MLS/HR: 1 INJECTION, POWDER, LYOPHILIZED, FOR SOLUTION INTRAVENOUS at 21:42

## 2018-11-12 RX ADMIN — CEFEPIME SCH MLS/HR: 1 INJECTION, SOLUTION INTRAVENOUS at 05:05

## 2018-11-12 RX ADMIN — INSULIN HUMAN SCH: 100 INJECTION, SOLUTION PARENTERAL at 15:42

## 2018-11-12 NOTE — CP.PCM.PN
Subjective





- Date & Time of Evaluation


Date of Evaluation: 11/12/18


Time of Evaluation: 13:33





- Subjective


Subjective: 





Podiatry progress Note for Dr. Ramirez





57 yo male seen and evaluated at bedside with Dr. Ramirez. Patient is mentally 

disabled. States he does not have any pain in his legs today. Patient denies any

overnight fever, chills, nausea, vomiting, diarrhea or SOB. He denies any other 

pedal complaints at this time.





Objective





- Vital Signs/Intake and Output


Vital Signs (last 24 hours): 


                                        











Temp Pulse Resp BP Pulse Ox


 


 97.6 F   67   19   131/67   98 


 


 11/12/18 12:00  11/12/18 12:00  11/12/18 12:00  11/12/18 12:00  11/12/18 06:00








Intake and Output: 


                                        











 11/12/18 11/12/18





 06:59 18:59


 


Intake Total 1770 


 


Output Total 2200 


 


Balance -430 














- Medications


Medications: 


                               Current Medications





Apixaban (Eliquis)  2.5 mg PO BID [facility]; Protocol


   Last Admin: 11/12/18 09:58 Dose:  2.5 mg


Atorvastatin Calcium (Lipitor)  40 mg PO HS [facility]


   Last Admin: 11/11/18 21:46 Dose:  40 mg


Betamethasone/Clotrimazole (Lotrisone)  1 ml TOP BID [facility]


   Last Admin: 11/12/18 10:05 Dose:  1 applic


Furosemide (Lasix)  20 mg PO DAILY [facility]


   Last Admin: 11/12/18 09:58 Dose:  20 mg


Gabapentin (Neurontin)  400 mg PO TID [facility]; Protocol


   Last Admin: 11/12/18 09:57 Dose:  400 mg


Glipizide (Glucotrol)  10 mg PO BID [facility]


   Last Admin: 11/12/18 09:57 Dose:  10 mg


Vancomycin HCl (Vancomycin 1gm)  1 gm in 250 mls @ 167 mls/hr IVPB Q12 [facility]; 

Protocol


   Last Admin: 11/12/18 09:58 Dose:  167 mls/hr


Cefepime HCl (Maxipime 1gm)  1 gm in 100 mls @ 100 mls/hr IVPB Q8 [facility]; Protocol


   Last Admin: 11/12/18 05:05 Dose:  100 mls/hr


Insulin Human Regular (Humulin R High)  0 units SC ACHS [facility]; Protocol


   Last Admin: 11/12/18 09:57 Dose:  7 units


Multivitamins/Minerals (Therapeutic-M Tab)  1 tab PO 0800 [facility]


   Last Admin: 11/12/18 09:58 Dose:  1 tab


Nystatin (Nystop Topical Powder)  1 gm TOP BID [facility]


   Last Admin: 11/12/18 10:04 Dose:  1 applic


Ramipril (Altace)  10 mg PO DAILY [facility]


   Last Admin: 11/12/18 09:57 Dose:  10 mg











- Labs


Labs: 


                                        





                                 11/09/18 19:50 





                                 11/09/18 19:50 





                                        











PT  11.8 SECONDS (9.4-12.5)   11/09/18  19:50    


 


INR  1.03   11/09/18  19:50    


 


APTT  46.2 Seconds (25.1-36.5)  H  11/09/18  19:50    














- Constitutional


Appears: Well, Non-toxic, No Acute Distress





- Head Exam


Head Exam: ATRAUMATIC, NORMOCEPHALIC





- Extremities Exam


Additional comments: 





Vasc: DP 2/4, PT pulse is non palpable 2ry to edema . Temperature gradient warm 

to warm from proximal to distal. Cap refill < 3 sec to all digits. Diffuse non 

pitting pedal edema noted from tibial tuberosity to digits





Neuro: Protective and gross sensations are grossly intact





Derm: Diffuse elephantiasis skin changes with lichnification and wart like skin 

lesions secondary scaling noted to the foot and leg up to the level of the 

tibial tuberosity. Superficial ulceration improved on LLE superior to the medial

malleolus, no purulence, Mild malodor, no fluctuance. No clinical suspicion for 

infection. Slight erythema noted.





MSK: No tenderness to posterior calf. No tenderness to palpation. Muscle power 

intact 5/5 in all groups.





- Neurological Exam


Neurological Exam: Alert, Awake





- Psychiatric Exam


Psychiatric exam: Normal Affect, Normal Mood





Assessment and Plan





- Assessment and Plan (Free Text)


Assessment: 





57 y/o diabetic male with  bilateral lower extremity lymphedema,  Left leg 

cellulitis and stasis ulceration


Plan: 





Patient seen and evaluated at bedside with Dr. Ramirez


Labs and vitals reviewed- afebrile, WBC 9.9 (11/9)


ID on board: Recommendations appreciated


Lotrisone lotion applied to the LLE.


Nystatin powder applied between folds of legs and behind the left knee


No dressing applied to lower extremities


Podiatry will follow up the patient while in house

## 2018-11-12 NOTE — CP.PCM.PN
Subjective





- Date & Time of Evaluation


Date of Evaluation: 11/12/18


Time of Evaluation: 08:45





- Subjective


Subjective: 


No fevers, not in distress.





Objective





- Vital Signs/Intake and Output


Vital Signs (last 24 hours): 


                                        











Temp Pulse Resp BP Pulse Ox


 


 98.0 F   73   20   130/80   97 


 


 11/11/18 06:00  11/11/18 06:00  11/11/18 06:00  11/11/18 09:36  11/11/18 06:00








Intake and Output: 


                                        











 11/11/18 11/11/18





 06:59 18:59


 


Intake Total 2290 


 


Output Total 1100 


 


Balance 1190 














- Medications


Medications: 


                               Current Medications





Apixaban (Eliquis)  2.5 mg PO BID [facility]; Protocol


   Last Admin: 11/11/18 09:37 Dose:  2.5 mg


Atorvastatin Calcium (Lipitor)  40 mg PO HS [facility]


   Last Admin: 11/10/18 21:41 Dose:  40 mg


Betamethasone/Clotrimazole (Lotrisone)  1 ml TOP BID [facility]


   Last Admin: 11/11/18 09:37 Dose:  1 applic


Furosemide (Lasix)  20 mg PO DAILY [facility]


   Last Admin: 11/11/18 09:36 Dose:  20 mg


Gabapentin (Neurontin)  400 mg PO TID [facility]; Protocol


   Last Admin: 11/11/18 09:37 Dose:  400 mg


Vancomycin HCl (Vancomycin 1gm)  1 gm in 250 mls @ 167 mls/hr IVPB Q12 [facility]; 

Protocol


   Last Admin: 11/11/18 09:36 Dose:  167 mls/hr


Cefepime HCl (Maxipime 1gm)  1 gm in 100 mls @ 100 mls/hr IVPB Q8 [facility]; Protocol


   Last Admin: 11/11/18 05:43 Dose:  100 mls/hr


Insulin Human Regular (Humulin R Med)  0 units SC ACHS [facility]; Protocol


   Last Admin: 11/11/18 08:21 Dose:  10 units


Multivitamins/Minerals (Therapeutic-M Tab)  1 tab PO 0800 [facility]


   Last Admin: 11/11/18 08:21 Dose:  1 tab


Nystatin (Nystop Topical Powder)  1 gm TOP BID [facility]


   Last Admin: 11/11/18 09:37 Dose:  1 applic


Ramipril (Altace)  10 mg PO DAILY [facility]


   Last Admin: 11/11/18 09:37 Dose:  10 mg











- Labs


Labs: 


                                        





                                 11/09/18 19:50 





                                 11/09/18 19:50 





                                        











PT  11.8 SECONDS (9.4-12.5)   11/09/18  19:50    


 


INR  1.03   11/09/18  19:50    


 


APTT  46.2 Seconds (25.1-36.5)  H  11/09/18  19:50    














- Constitutional


Appears: No Acute Distress, Chronically Ill





- Head Exam


Head Exam: NORMAL INSPECTION





- Respiratory Exam


Respiratory Exam: Decreased Breath Sounds





- Cardiovascular Exam


Cardiovascular Exam: +S1, +S2





- GI/Abdominal Exam


GI & Abdominal Exam: Soft.  absent: Tenderness





- Extremities Exam


Additional comments: 





right leg with dressings in place





Assessment and Plan





- Assessment and Plan (Free Text)


Plan: 


Assessment


consider right lower extremity infected chronic wounds


history of sepsis due to right leg skin and skin structure infection associated 

with chronic leg ulcers, clinically improved and S/P treatment


history of MRSA and Klebsiella oxytoca right leg cellulitis


history of sepsis secondary to right leg skin/skin structure infection with 

chronic leg ulcers, growing MRSA and sensitive Klebsiella 


history of MRSA cellulitis Sept. 2016


history of Bilateral lower extremity skin and skin structure infection 

(Enterobacter, Klebsiella Oxytoca and Group B Strep, as well as MRSA) which was 

treated 9/2015; MRSA and Pseudomonas cellulitis of left leg in Dec 2015, May and

June 2016


chronic lymphedema of the lower extremities


Morbid obesity with BMI 35


HTN


DM


history of Strep bacteremia


history of hernia surgery


Learning disability





Plan


continue Vancomycin and Cefepime (patient is tolerating Cefepime without rash, 

no respiratory distress, no hives) day 3 for 7-10 days pending final blood, 

wound cx results; follow up surgery evaluation and plans


will continue to monitor clinically

## 2018-11-13 ENCOUNTER — HOSPITAL ENCOUNTER (INPATIENT)
Dept: HOSPITAL 42 - TRCU | Age: 58
LOS: 8 days | Discharge: HOME | DRG: 92 | End: 2018-11-21
Attending: INTERNAL MEDICINE | Admitting: INTERNAL MEDICINE
Payer: COMMERCIAL

## 2018-11-13 VITALS
DIASTOLIC BLOOD PRESSURE: 73 MMHG | HEART RATE: 64 BPM | RESPIRATION RATE: 18 BRPM | SYSTOLIC BLOOD PRESSURE: 132 MMHG | TEMPERATURE: 99.2 F

## 2018-11-13 VITALS — OXYGEN SATURATION: 95 %

## 2018-11-13 VITALS — BODY MASS INDEX: 49.2 KG/M2

## 2018-11-13 DIAGNOSIS — Z79.4: ICD-10-CM

## 2018-11-13 DIAGNOSIS — E11.42: ICD-10-CM

## 2018-11-13 DIAGNOSIS — I89.0: ICD-10-CM

## 2018-11-13 DIAGNOSIS — I48.2: ICD-10-CM

## 2018-11-13 DIAGNOSIS — Z87.891: ICD-10-CM

## 2018-11-13 DIAGNOSIS — I10: ICD-10-CM

## 2018-11-13 DIAGNOSIS — E11.622: ICD-10-CM

## 2018-11-13 DIAGNOSIS — E66.01: ICD-10-CM

## 2018-11-13 DIAGNOSIS — Z79.01: ICD-10-CM

## 2018-11-13 DIAGNOSIS — R26.9: Primary | ICD-10-CM

## 2018-11-13 DIAGNOSIS — Z79.2: ICD-10-CM

## 2018-11-13 DIAGNOSIS — L03.116: ICD-10-CM

## 2018-11-13 DIAGNOSIS — E78.5: ICD-10-CM

## 2018-11-13 DIAGNOSIS — G40.909: ICD-10-CM

## 2018-11-13 DIAGNOSIS — I87.2: ICD-10-CM

## 2018-11-13 DIAGNOSIS — B36.8: ICD-10-CM

## 2018-11-13 DIAGNOSIS — F79: ICD-10-CM

## 2018-11-13 DIAGNOSIS — Z86.14: ICD-10-CM

## 2018-11-13 DIAGNOSIS — F81.9: ICD-10-CM

## 2018-11-13 DIAGNOSIS — L97.829: ICD-10-CM

## 2018-11-13 DIAGNOSIS — L03.115: ICD-10-CM

## 2018-11-13 DIAGNOSIS — G80.9: ICD-10-CM

## 2018-11-13 PROCEDURE — 3E03329 INTRODUCTION OF OTHER ANTI-INFECTIVE INTO PERIPHERAL VEIN, PERCUTANEOUS APPROACH: ICD-10-PCS | Performed by: INTERNAL MEDICINE

## 2018-11-13 RX ADMIN — VANCOMYCIN HYDROCHLORIDE SCH MLS/HR: 1 INJECTION, POWDER, LYOPHILIZED, FOR SOLUTION INTRAVENOUS at 22:13

## 2018-11-13 RX ADMIN — VANCOMYCIN HYDROCHLORIDE SCH MLS/HR: 1 INJECTION, POWDER, LYOPHILIZED, FOR SOLUTION INTRAVENOUS at 09:55

## 2018-11-13 RX ADMIN — NYSTATIN SCH: 100000 POWDER TOPICAL at 17:29

## 2018-11-13 RX ADMIN — CEFEPIME SCH MLS/HR: 1 INJECTION, SOLUTION INTRAVENOUS at 05:15

## 2018-11-13 RX ADMIN — MULTIPLE VITAMINS W/ MINERALS TAB SCH TAB: TAB at 08:15

## 2018-11-13 RX ADMIN — INSULIN HUMAN SCH UNITS: 100 INJECTION, SOLUTION PARENTERAL at 17:28

## 2018-11-13 RX ADMIN — INSULIN HUMAN SCH UNITS: 100 INJECTION, SOLUTION PARENTERAL at 13:01

## 2018-11-13 RX ADMIN — CLOTRIMAZOLE AND BETAMETHASONE DIPROPIONATE SCH: 10; .5 LOTION TOPICAL at 17:29

## 2018-11-13 RX ADMIN — INSULIN HUMAN SCH UNITS: 100 INJECTION, SOLUTION PARENTERAL at 08:14

## 2018-11-13 RX ADMIN — CEFEPIME SCH MLS/HR: 1 INJECTION, SOLUTION INTRAVENOUS at 22:12

## 2018-11-13 RX ADMIN — INSULIN HUMAN SCH: 100 INJECTION, SOLUTION PARENTERAL at 22:18

## 2018-11-13 RX ADMIN — NYSTATIN SCH: 100000 POWDER TOPICAL at 09:57

## 2018-11-13 RX ADMIN — CLOTRIMAZOLE AND BETAMETHASONE DIPROPIONATE SCH: 10; .5 LOTION TOPICAL at 09:58

## 2018-11-13 RX ADMIN — CEFEPIME SCH MLS/HR: 1 INJECTION, SOLUTION INTRAVENOUS at 13:03

## 2018-11-13 NOTE — CP.PCM.DIS
<Pa Hayes - Last Filed: 11/13/18 17:38>





Provider





- Provider


Date of Admission: 


11/09/18 19:31





Attending physician: 


Delbert Corona MD





Consults: 


Dr. Cheyenne Marshall


Time Spent in preparation of Discharge (in minutes): 35





Hospital Course





- Lab Results


Lab Results: 


                                  Micro Results





11/10/18 00:09   Foot - Left   Gram Stain - Final


11/10/18 00:09   Foot - Left   Wound Culture - Final


                            No growth.


11/09/18 19:50   Blood   Blood Culture - Preliminary


                            NO GROWTH AFTER 3 DAYS


11/09/18 19:20   Blood   Blood Culture - Preliminary


                            NO GROWTH AFTER 3 DAYS





                             Most Recent Lab Values











WBC  9.9 10^3/uL (4.5-11.0)   11/09/18  19:50    


 


RBC  4.91 10^6/uL (3.5-6.1)   11/09/18  19:50    


 


Hgb  14.7 g/dL (14.0-18.0)   11/09/18  19:50    


 


Hct  43.3 % (42.0-52.0)   11/09/18  19:50    


 


MCV  88.2 fl (80.0-105.0)   11/09/18  19:50    


 


MCH  29.9 pg (25.0-35.0)   11/09/18  19:50    


 


MCHC  33.9 g/dl (31.0-37.0)   11/09/18  19:50    


 


RDW  12.6 % (11.5-14.5)   11/09/18  19:50    


 


Plt Count  269 10^3/uL (120.0-450.0)   11/09/18  19:50    


 


MPV  10.3 fl (7.0-11.0)   11/09/18  19:50    


 


Gran %  76.6 % (50.0-68.0)  H  11/09/18  19:50    


 


Lymph % (Auto)  14.9 % (22.0-35.0)  L  11/09/18  19:50    


 


Mono % (Auto)  7.3 % (1.0-6.0)  H  11/09/18  19:50    


 


Eos % (Auto)  1.0 % (1.5-5.0)  L  11/09/18  19:50    


 


Baso % (Auto)  0.2 % (0.0-3.0)   11/09/18  19:50    


 


Gran #  7.55  (1.4-6.5)  H  11/09/18  19:50    


 


Lymph # (Auto)  1.5  (1.2-3.4)   11/09/18  19:50    


 


Mono # (Auto)  0.7  (0.1-0.6)  H  11/09/18  19:50    


 


Eos # (Auto)  0.1  (0.0-0.7)   11/09/18  19:50    


 


Baso # (Auto)  0.02 K/mm3 (0.0-2.0)   11/09/18  19:50    


 


PT  11.8 SECONDS (9.4-12.5)   11/09/18  19:50    


 


INR  1.03   11/09/18  19:50    


 


APTT  46.2 Seconds (25.1-36.5)  H  11/09/18  19:50    


 


Sodium  136 mmol/L (132-148)   11/09/18  19:50    


 


Potassium  4.5 mmol/L (3.6-5.0)   11/09/18  19:50    


 


Chloride  99 mmol/L ()   11/09/18  19:50    


 


Carbon Dioxide  29 mmol/L (21-33)   11/09/18  19:50    


 


Anion Gap  12  (10-20)   11/09/18  19:50    


 


BUN  10 mg/dL (7-21)   11/09/18  19:50    


 


Creatinine  0.7 mg/dl (0.8-1.5)  L  11/09/18  19:50    


 


Est GFR ( Amer)  > 60   11/09/18  19:50    


 


Est GFR (Non-Af Amer)  > 60   11/09/18  19:50    


 


POC Glucose (mg/dL)  234 mg/dL ()  H  11/13/18  11:29    


 


Random Glucose  330 mg/dL ()  H* D 11/09/18  19:50    


 


Calcium  9.7 mg/dL (8.4-10.5)   11/09/18  19:50    


 


Total Bilirubin  0.8 mg/dL (0.2-1.3)   11/09/18  19:50    


 


AST  23 U/L (17-59)   11/09/18  19:50    


 


ALT  37 U/L (7-56)   11/09/18  19:50    


 


Alkaline Phosphatase  121 U/L ()   11/09/18  19:50    


 


Lactate Dehydrogenase  419 U/L (333-699)   11/09/18  19:50    


 


Total Creatine Kinase  55 U/L ()   11/09/18  19:50    


 


Troponin I  < 0.01 ng/mL  11/09/18  19:50    


 


Total Protein  7.5 g/dL (5.8-8.3)   11/09/18  19:50    


 


Albumin  4.2 g/dL (3.0-4.8)   11/09/18  19:50    


 


Globulin  3.3 gm/dL  11/09/18  19:50    


 


Albumin/Globulin Ratio  1.3  (1.1-1.8)   11/09/18  19:50    


 


Vancomycin Trough  7.7 ug/mL (5.0-10.0)   11/12/18  07:00    














- Hospital Course


Hospital Course: 


58 year old male with a past medical history of morbid obesity, chronic lower 

extremity cellulitis of the legs with skin changes, atrial fibrillation on 

anticoagulation, hypertension, dyslipidemia, mute, and nephrloithiasis who prese

nted [place] [facility] [place] of the left foot. ID, Surgery, and podiatry 

were consulted. Patient has been getting wound care from podiatry on a daily 

basis. He is on IV antibiotics, Cefepime and Vancomycin. His blood and wound 

cultures grew no bacteria. Patient will be admitted to the TCU for physical 

therapy and continued meidcal care of his bilateral lower extremities, including

wound care and IV antibiotics. 





PMH: see above


PSH: inguinal hernia repair 


FH: noncontributory


Soc: denies tobacco, alcohol, drugs


ALL: zosyn








- Date & Time of H&P


Date of H&P: 11/13/18


Time of H&P: 17:46





Discharge Exam





- Head Exam


Head Exam: ATRAUMATIC, NORMOCEPHALIC





- Eye Exam


Eye Exam: EOMI, Normal appearance





- ENT Exam


ENT Exam: Mucous Membranes Moist, Normal Oropharynx





- Neck Exam


Neck exam: Normal Inspection





- Respiratory Exam


Respiratory Exam: Clear to PA & Lateral, NORMAL BREATHING PATTERN.  absent: 

Accessory Muscle Use





- Cardiovascular Exam


Cardiovascular Exam: RRR, +S1, +S2





- GI/Abdominal Exam


GI & Abdominal Exam: Normal Bowel Sounds.  absent: Guarding





- Extremities Exam


Extremities exam: normal capillary refill, pedal edema


Additional comments: 


elephantiasis with superficial warts








- Psychiatric Exam


Psychiatric exam: Normal Affect, Normal Mood





- Skin


Skin Exam: Dry, Intact, Normal Color, Warm





Discharge Plan





- Follow Up Plan


Condition: FAIR


Disposition: REHAB FACILITY/REHAB UNIT


Instructions:  Cellulitis (ED), Cellulitis (DC), Cellulitis (GEN)


Additional Instructions: 


1) Patient to be transferred to TCU and all medications to be resumed as they 

were while patient was in hospital. He will need 4 more days of antibiotics, or 

as indicated by infectious disease, Dr. Quinn.


2) Re-consult ID, Dr. Quinn and Dr. Ramirez once TCU chart opens up. Thank you





<Delbert Corona - Last Filed: 11/13/18 18:04>





Provider





- Provider


Date of Admission: 


11/09/18 19:31





Attending physician: 


Delbert Corona MD








Hospital Course





- Lab Results


Lab Results: 


                                  Micro Results





11/10/18 00:09   Foot - Left   Gram Stain - Final


11/10/18 00:09   Foot - Left   Wound Culture - Final


                            No growth.


11/09/18 19:50   Blood   Blood Culture - Preliminary


                            NO GROWTH AFTER 3 DAYS


11/09/18 19:20   Blood   Blood Culture - Preliminary


                            NO GROWTH AFTER 3 DAYS





                             Most Recent Lab Values











WBC  9.9 10^3/uL (4.5-11.0)   11/09/18  19:50    


 


RBC  4.91 10^6/uL (3.5-6.1)   11/09/18  19:50    


 


Hgb  14.7 g/dL (14.0-18.0)   11/09/18  19:50    


 


Hct  43.3 % (42.0-52.0)   11/09/18  19:50    


 


MCV  88.2 fl (80.0-105.0)   11/09/18  19:50    


 


MCH  29.9 pg (25.0-35.0)   11/09/18  19:50    


 


MCHC  33.9 g/dl (31.0-37.0)   11/09/18  19:50    


 


RDW  12.6 % (11.5-14.5)   11/09/18  19:50    


 


Plt Count  269 10^3/uL (120.0-450.0)   11/09/18  19:50    


 


MPV  10.3 fl (7.0-11.0)   11/09/18  19:50    


 


Gran %  76.6 % (50.0-68.0)  H  11/09/18  19:50    


 


Lymph % (Auto)  14.9 % (22.0-35.0)  L  11/09/18  19:50    


 


Mono % (Auto)  7.3 % (1.0-6.0)  H  11/09/18  19:50    


 


Eos % (Auto)  1.0 % (1.5-5.0)  L  11/09/18  19:50    


 


Baso % (Auto)  0.2 % (0.0-3.0)   11/09/18  19:50    


 


Gran #  7.55  (1.4-6.5)  H  11/09/18  19:50    


 


Lymph # (Auto)  1.5  (1.2-3.4)   11/09/18  19:50    


 


Mono # (Auto)  0.7  (0.1-0.6)  H  11/09/18  19:50    


 


Eos # (Auto)  0.1  (0.0-0.7)   11/09/18  19:50    


 


Baso # (Auto)  0.02 K/mm3 (0.0-2.0)   11/09/18  19:50    


 


PT  11.8 SECONDS (9.4-12.5)   11/09/18  19:50    


 


INR  1.03   11/09/18  19:50    


 


APTT  46.2 Seconds (25.1-36.5)  H  11/09/18  19:50    


 


Sodium  136 mmol/L (132-148)   11/09/18  19:50    


 


Potassium  4.5 mmol/L (3.6-5.0)   11/09/18  19:50    


 


Chloride  99 mmol/L ()   11/09/18  19:50    


 


Carbon Dioxide  29 mmol/L (21-33)   11/09/18  19:50    


 


Anion Gap  12  (10-20)   11/09/18  19:50    


 


BUN  10 mg/dL (7-21)   11/09/18  19:50    


 


Creatinine  0.7 mg/dl (0.8-1.5)  L  11/09/18  19:50    


 


Est GFR ( Amer)  > 60   11/09/18  19:50    


 


Est GFR (Non-Af Amer)  > 60   11/09/18  19:50    


 


POC Glucose (mg/dL)  218 mg/dL ()  H  11/13/18  16:04    


 


Random Glucose  330 mg/dL ()  H* D 11/09/18  19:50    


 


Calcium  9.7 mg/dL (8.4-10.5)   11/09/18  19:50    


 


Total Bilirubin  0.8 mg/dL (0.2-1.3)   11/09/18  19:50    


 


AST  23 U/L (17-59)   11/09/18  19:50    


 


ALT  37 U/L (7-56)   11/09/18  19:50    


 


Alkaline Phosphatase  121 U/L ()   11/09/18  19:50    


 


Lactate Dehydrogenase  419 U/L (333-699)   11/09/18  19:50    


 


Total Creatine Kinase  55 U/L ()   11/09/18  19:50    


 


Troponin I  < 0.01 ng/mL  11/09/18  19:50    


 


Total Protein  7.5 g/dL (5.8-8.3)   11/09/18  19:50    


 


Albumin  4.2 g/dL (3.0-4.8)   11/09/18  19:50    


 


Globulin  3.3 gm/dL  11/09/18  19:50    


 


Albumin/Globulin Ratio  1.3  (1.1-1.8)   11/09/18  19:50    


 


Vancomycin Trough  7.7 ug/mL (5.0-10.0)   11/12/18  07:00    














- Hospital Course


Hospital Course: 





Pt seen and examined. I have reviewed the note of the medical resident and agree

with it. I have discussed the assessment and plan with the resident.  I have 

reviewed the patient's labs and medications. Pt with foot wound that is 

improving. Pt will need to continue with IV Abx. He will go to TCU to continue 

his Abx. His Afib is controlled. He is on anticoagulation. HTN is controlled on 

meds.

## 2018-11-13 NOTE — CP.PCM.PN
Subjective





- Date & Time of Evaluation


Date of Evaluation: 11/13/18


Time of Evaluation: 09:35





- Subjective


Subjective: 





Comfortable, non-toxic.





Objective





- Vital Signs/Intake and Output


Vital Signs (last 24 hours): 


                                        











Temp Pulse Resp BP Pulse Ox


 


 97.6 F   67   19   131/67   98 


 


 11/12/18 12:00  11/12/18 12:00  11/12/18 12:00  11/12/18 12:00  11/12/18 06:00








Intake and Output: 


                                        











 11/12/18 11/12/18





 06:59 18:59


 


Intake Total 1770 


 


Output Total 2200 


 


Balance -430 














- Medications


Medications: 


                               Current Medications





Apixaban (Eliquis)  2.5 mg PO BID [facility]; Protocol


   Last Admin: 11/12/18 09:58 Dose:  2.5 mg


Atorvastatin Calcium (Lipitor)  40 mg PO HS [facility]


   Last Admin: 11/11/18 21:46 Dose:  40 mg


Betamethasone/Clotrimazole (Lotrisone)  1 ml TOP BID [facility]


   Last Admin: 11/12/18 10:05 Dose:  1 applic


Furosemide (Lasix)  20 mg PO DAILY [facility]


   Last Admin: 11/12/18 09:58 Dose:  20 mg


Gabapentin (Neurontin)  400 mg PO TID [facility]; Protocol


   Last Admin: 11/12/18 09:57 Dose:  400 mg


Glipizide (Glucotrol)  10 mg PO BID [facility]


   Last Admin: 11/12/18 09:57 Dose:  10 mg


Vancomycin HCl (Vancomycin 1gm)  1 gm in 250 mls @ 167 mls/hr IVPB Q12 [facility]; 

Protocol


   Last Admin: 11/12/18 09:58 Dose:  167 mls/hr


Cefepime HCl (Maxipime 1gm)  1 gm in 100 mls @ 100 mls/hr IVPB Q8 [facility]; Protocol


   Last Admin: 11/12/18 05:05 Dose:  100 mls/hr


Insulin Human Regular (Humulin R High)  0 units SC ACHS [facility]; Protocol


   Last Admin: 11/12/18 09:57 Dose:  7 units


Multivitamins/Minerals (Therapeutic-M Tab)  1 tab PO 0800 [facility]


   Last Admin: 11/12/18 09:58 Dose:  1 tab


Nystatin (Nystop Topical Powder)  1 gm TOP BID [facility]


   Last Admin: 11/12/18 10:04 Dose:  1 applic


Ramipril (Altace)  10 mg PO DAILY [facility]


   Last Admin: 11/12/18 09:57 Dose:  10 mg











- Labs


Labs: 


                                        





                                 11/09/18 19:50 





                                 11/09/18 19:50 





                                        











PT  11.8 SECONDS (9.4-12.5)   11/09/18  19:50    


 


INR  1.03   11/09/18  19:50    


 


APTT  46.2 Seconds (25.1-36.5)  H  11/09/18  19:50    














- Constitutional


Appears: Chronically Ill





- Head Exam


Head Exam: NORMAL INSPECTION





- Respiratory Exam


Respiratory Exam: Decreased Breath Sounds





- Cardiovascular Exam


Cardiovascular Exam: +S1, +S2





- GI/Abdominal Exam


GI & Abdominal Exam: Soft.  absent: Tenderness





- Extremities Exam


Additional comments: 





right leg with dressings in place





Assessment and Plan





- Assessment and Plan (Free Text)


Plan: 





Assessment


consider right lower extremity infected chronic wounds


history of sepsis due to right leg skin and skin structure infection associated 

with chronic leg ulcers, clinically improved and S/P treatment


history of MRSA and Klebsiella oxytoca right leg cellulitis


history of sepsis secondary to right leg skin/skin structure infection with 

chronic leg ulcers, growing MRSA and sensitive Klebsiella 


history of MRSA cellulitis Sept. 2016


history of Bilateral lower extremity skin and skin structure infection 

(Enterobacter, Klebsiella Oxytoca and Group B Strep, as well as MRSA) which was 

treated 9/2015; MRSA and Pseudomonas cellulitis of left leg in Dec 2015, May and

June 2016


chronic lymphedema of the lower extremities


Morbid obesity with BMI 35


HTN


DM


history of Strep bacteremia


history of hernia surgery


Learning disability





Plan


continue Vancomycin and Cefepime (patient is tolerating Cefepime without rash, 

no respiratory distress, no hives) day 4 for 7-10 days pending final blood, 

wound cx results; follow up surgery evaluation and plans


will continue to monitor clinically

## 2018-11-13 NOTE — CP.PCM.PN
Subjective





- Date & Time of Evaluation


Date of Evaluation: 11/13/18


Time of Evaluation: 07:00





Objective





- Vital Signs/Intake and Output


Vital Signs (last 24 hours): 


                                        











Temp Pulse Resp BP Pulse Ox


 


 98.4 F   62   19   138/74   95 


 


 11/13/18 12:00  11/13/18 12:00  11/13/18 12:00  11/13/18 12:00  11/13/18 06:00








Intake and Output: 


                                        











 11/13/18 11/13/18





 06:59 18:59


 


Intake Total 570 


 


Output Total 700 


 


Balance -130 














- Medications


Medications: 


                               Current Medications





Apixaban (Eliquis)  2.5 mg PO BID [facility]; Protocol


   Last Admin: 11/13/18 09:57 Dose:  2.5 mg


Atorvastatin Calcium (Lipitor)  40 mg PO HS [facility]


   Last Admin: 11/12/18 21:36 Dose:  40 mg


Betamethasone/Clotrimazole (Lotrisone)  1 ml TOP BID [facility]


   Last Admin: 11/13/18 09:58 Dose:  Not Given


Furosemide (Lasix)  20 mg PO DAILY [facility]


   Last Admin: 11/13/18 09:57 Dose:  20 mg


Gabapentin (Neurontin)  400 mg PO TID [facility]; Protocol


   Last Admin: 11/13/18 09:57 Dose:  400 mg


Glipizide (Glucotrol)  10 mg PO BID [facility]


   Last Admin: 11/13/18 09:57 Dose:  10 mg


Vancomycin HCl (Vancomycin 1gm)  1 gm in 250 mls @ 167 mls/hr IVPB Q12 [facility]; 

Protocol


   Last Admin: 11/13/18 09:55 Dose:  167 mls/hr


Cefepime HCl (Maxipime 1gm)  1 gm in 100 mls @ 100 mls/hr IVPB Q8 [facility]; Protocol


   Last Admin: 11/13/18 05:15 Dose:  100 mls/hr


Insulin Human Regular (Humulin R High)  0 units SC ACHS [facility]; Protocol


   Last Admin: 11/13/18 08:14 Dose:  7 units


Multivitamins/Minerals (Therapeutic-M Tab)  1 tab PO 0800 [facility]


   Last Admin: 11/13/18 08:15 Dose:  1 tab


Nystatin (Nystop Topical Powder)  1 gm TOP BID [facility]


   Last Admin: 11/13/18 09:57 Dose:  Not Given


Ramipril (Altace)  10 mg PO DAILY [facility]


   Last Admin: 11/13/18 09:56 Dose:  10 mg











- Labs


Labs: 


                                        





                                 11/09/18 19:50 





                                 11/09/18 19:50 





                                        











PT  11.8 SECONDS (9.4-12.5)   11/09/18  19:50    


 


INR  1.03   11/09/18  19:50    


 


APTT  46.2 Seconds (25.1-36.5)  H  11/09/18  19:50

## 2018-11-14 RX ADMIN — INSULIN HUMAN SCH UNIT: 100 INJECTION, SOLUTION PARENTERAL at 12:25

## 2018-11-14 RX ADMIN — VANCOMYCIN HYDROCHLORIDE SCH MLS/HR: 1 INJECTION, POWDER, LYOPHILIZED, FOR SOLUTION INTRAVENOUS at 17:45

## 2018-11-14 RX ADMIN — INSULIN HUMAN SCH UNIT: 100 INJECTION, SOLUTION PARENTERAL at 17:44

## 2018-11-14 RX ADMIN — NYSTATIN SCH APPLIC: 100000 POWDER TOPICAL at 10:29

## 2018-11-14 RX ADMIN — NYSTATIN SCH APPLIC: 100000 POWDER TOPICAL at 17:45

## 2018-11-14 RX ADMIN — CEFEPIME SCH MLS/HR: 1 INJECTION, SOLUTION INTRAVENOUS at 21:37

## 2018-11-14 RX ADMIN — VANCOMYCIN HYDROCHLORIDE SCH MLS/HR: 1 INJECTION, POWDER, LYOPHILIZED, FOR SOLUTION INTRAVENOUS at 05:00

## 2018-11-14 RX ADMIN — INSULIN HUMAN SCH: 100 INJECTION, SOLUTION PARENTERAL at 21:37

## 2018-11-14 RX ADMIN — MULTIPLE VITAMINS W/ MINERALS TAB SCH TAB: TAB at 07:52

## 2018-11-14 RX ADMIN — CLOTRIMAZOLE AND BETAMETHASONE DIPROPIONATE SCH APPLIC: 10; .5 LOTION TOPICAL at 10:29

## 2018-11-14 RX ADMIN — CLOTRIMAZOLE AND BETAMETHASONE DIPROPIONATE SCH APPLIC: 10; .5 LOTION TOPICAL at 17:46

## 2018-11-14 RX ADMIN — CEFEPIME SCH MLS/HR: 1 INJECTION, SOLUTION INTRAVENOUS at 04:59

## 2018-11-14 RX ADMIN — CEFEPIME SCH MLS/HR: 1 INJECTION, SOLUTION INTRAVENOUS at 13:49

## 2018-11-14 RX ADMIN — INSULIN HUMAN SCH UNIT: 100 INJECTION, SOLUTION PARENTERAL at 06:35

## 2018-11-14 NOTE — CP.PCM.HP
<Pa Hayes - Last Filed: 11/14/18 12:14>





History of Present Illness





- History of Present Illness


History of Present Illness: 


HPI for Dr. Corona


58 year old male with a past medical history of morbid obesity, chronic lower 

extremity cellulitis of the legs with skin changes, atrial fibrillation on 

anticoagulation, hypertension, dyslipidemia, mute, and nephrloithiasis who 

presented to [facility] for a draining wound of the left foot. ID, Surgery, and 

podiatry were consulted. Patient has been getting wound care from podiatry on a 

daily basis. He is on IV antibiotics, Cefepime and Vancomycin. His blood and 

wound cultures grew no bacteria. Patient will be admitted to the TCU for 

physical therapy and continued medcal care of his bilateral lower extremities, 

including wound care and IV antibiotics. Patient denies chest pain, nausea, 

vomiting, dyspnea, visual changes, diarrhea, constipation, blood in stool, 

history or exposure to TB, fever, chills, rigors, easy bruising or bleeding, 

unilateral weakness or numbness. 





PMH: morbid obesity, chronic lower extremity cellulitis of the legs with skin 

changes, atrial fibrillation on anticoagulation, hypertension, dyslipidemia, 

mute, and nephrolithiasis 


PSH: inguinal hernia repair 


FH: noncontributory


Soc: denies tobacco, alcohol, drugs


ALL: zosyn











Present on Admission





- Present on Admission


Any Indicators Present on Admission: No





Review of Systems





- Review of Systems


All systems: reviewed and no additional remarkable complaints except (as per 

HPI)





Past Patient History





- Infectious Disease


Hx of Infectious Diseases: None





- Tetanus Immunizations


Tetanus Immunization: Unknown





- Past Medical History & Family History


Past Medical History?: Yes





- Past Social History


Smoking Status: Former Smoker





- CARDIAC


Hx Cardiac Disorders: Yes (A fib)


Hx Hypertension: Yes





- PULMONARY


Hx Respiratory Disorders: No





- NEUROLOGICAL


Hx Neurological Disorder: Yes


Hx Seizures: Yes


Other/Comment: Epilepsy





- HEENT


Hx HEENT Problems: Yes


Other/Comment: wears glasses





- RENAL


Hx Chronic Kidney Disease: No





- ENDOCRINE/METABOLIC


Hx Diabetes Mellitus Type 2: Yes





- HEMATOLOGICAL/ONCOLOGICAL


Hx Blood Disorders: No


Other/Comment: blood infection





- INTEGUMENTARY


Hx Dermatological Problems: Yes


Other/Comment: multiple wounds on the rt leg, cellulitis in b/l extreminity. 

Left thigh cellulitis





- MUSCULOSKELETAL/RHEUMATOLOGICAL


Hx Falls: Yes





- GASTROINTESTINAL


Hx Gastrointestinal Disorders: Yes





- GENITOURINARY/GYNECOLOGICAL


Hx Reproductive Disorders: No





- PSYCHIATRIC


Hx Psychophysiologic Disorder: Yes


Hx Substance Use: No


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





- SURGICAL HISTORY


Hx Amputation: No


Hx Appendectomy: No


Hx Cholecystectomy: No


Hx Gastric Bypass Surgery: No


Hx Hysterectomy: No


Hx Joint Replacement: No


Hx Kidney Transplant: No


Hx Liver Transplant: No


Hx Mastectomy: No


Hx Musculoskeletal Surgery: No


Hx Open Heart Surgery: No


Hx Orthopedic Surgery: No


Hx Splenectomy: No


Hx Valve Replacement: No





- ANESTHESIA


Hx Anesthesia: Yes


Hx Anesthesia Reactions: No


Hx Malignant Hyperthermia: No





Meds


Allergies/Adverse Reactions: 


                                    Allergies











Allergy/AdvReac Type Severity Reaction Status Date / Time


 


piperacillin Allergy  ANAPHYLAXIS Verified 11/09/18 23:26


 


tazobactam [From Zosyn] Allergy  ANAPHYLAXIS Verified 11/10/18 04:15














Physical Exam





- Constitutional


Appears: Well, Non-toxic





- Head Exam


Head Exam: ATRAUMATIC, NORMOCEPHALIC





- Eye Exam


Eye Exam: EOMI, Normal appearance





- ENT Exam


ENT Exam: Mucous Membranes Moist





- Neck Exam


Neck exam: Positive for: Normal Inspection





- Respiratory Exam


Respiratory Exam: Clear to Auscultation Bilateral, NORMAL BREATHING PATTERN.  

absent: Accessory Muscle Use





- Cardiovascular Exam


Cardiovascular Exam: RRR, +S1, +S2





- GI/Abdominal Exam


GI & Abdominal Exam: Normal Bowel Sounds, Soft





- Extremities Exam


Extremities exam: Negative for: calf tenderness


Additional comments: 





elephantiasis with superficial warts





- Neurological Exam


Neurological exam: Alert, CN II-XII Intact, Oriented x3





- Psychiatric Exam


Psychiatric exam: Normal Affect, Normal Mood





Results





- Vital Signs


Recent Vital Signs: 





                                Last Vital Signs











Temp  97.9 F   11/14/18 05:54


 


Pulse  56 L  11/14/18 05:54


 


Resp  20   11/14/18 05:54


 


BP  151/87 H  11/14/18 05:54


 


Pulse Ox  96   11/14/18 05:54














- Labs


Labs: 





                         Laboratory Results - last 24 hr











  11/14/18





  05:20


 


POC Glucose (mg/dL)  218 H














Assessment & Plan





- Assessment and Plan (Free Text)


Assessment: 


 58 year old male with a past medical history of morbid obesity, chronic lower 

extremity cellulitis of the legs with skin changes, atrial fibrillation on 

anticoagulation, hypertension, dyslipidemia, mute, and nephrloithiasis who 

presented with left foot and leg ulceration and cellulitis who is now admitted 

to the TCU for physical therapy for his gait instability and deconditioning as 

well and IV antibiotics





1) Cellulitis of left foot/leg in a patient with chronic lower extremity 

lymphedema


- Cefepime 1 gram q8h flakita


- Vancomycin 1 gram q12h


- ID is following, appreciate recommendations


- Podiatry for wound care


- Lasix 20 mg PO daily


- Lotrisone cream BID


- Multivitamin daily


2) Atrial Fibrillation 


- Eliquis 2.5 mg BID





3) Hypertension


- Ramipiril 10 mg daily





4) DM II


- Humulin ISS high with FSBG ACHS


- Glipizide 10 mg BID





5) Dyslipidemia


- Atorvastatin 40 mg HS





6) Fungal infection of leg and groin


- - Lotrisone cream BID


- Nystatin topical powder BID





7) Peripheral Neuropathy


- Gabapentin 400 mg TID





8) DVT/GI prophylaxis


- not indicated as patient is ambulating and no at risk for stomach ulcers





Case reviewed and discussed with attending physician, Dr. Corona


 








- Date & Time


Date: 11/14/18


Time: 12:24





<Delbert Corona S - Last Filed: 11/14/18 19:19>





Results





- Vital Signs


Recent Vital Signs: 





                                Last Vital Signs











Temp  98.5 F   11/14/18 16:00


 


Pulse  65   11/14/18 16:00


 


Resp  18   11/14/18 16:00


 


BP  132/79   11/14/18 16:00


 


Pulse Ox  97   11/14/18 16:00














- Labs


Labs: 





                         Laboratory Results - last 24 hr











  11/14/18 11/14/18 11/14/18





  05:20 11:09 17:03


 


POC Glucose (mg/dL)  218 H  256 H  192 H














Assessment & Plan





- Assessment and Plan (Free Text)


Assessment: 





Pt seen and examined. I have reviewed the note of the medical resident and agree

with it. I have discussed the assessment and plan with the resident.  I have 

reviewed the patient's labs and medications. Pt with cellulits of the L foot. He

is on IV Abx, Cefepime and Vanco. No pain. On Glipizide and Humulin for DM-2. On

Eliquis for A fib. On Atorvastatin for Dyslipidemia.

## 2018-11-14 NOTE — CP.PCM.CON
History of Present Illness





- History of Present Illness


History of Present Illness: 


58 year old male with PMH of right leg skin/skin structure infection with 

chronic leg ulcers, growing MRSA and sensitive Klebsiella in 2016, Bilateral 

lower extremity skin and skin structure infection (Enterobacter, Klebsiella 

Oxytoca and Group B Strep, as well as MRSA) which was treated 9/2015; MRSA and 

Pseudomonas cellulitis of left leg in Dec 2015, May and June 2016, and MRSA 

cellulitis in Sept. 2016, chronic lymphedema of the lower extremities, Morbid 

obesity with BMI 35, HTN, DM, history of Strep bacteremia, history of hernia 

surgery, Learning disability came in to [facility] because of right leg ulcer. He is 

being treated for leg infection with local wound care and antibiotics. He is now

transferred to [facility] for continued medical therapy and physical rehab. Infectious

Diseases consult is requested to continue his antibiotic therapy. He has no 

fevers, no nausea, no diarrhea, no dysuria, no increased pain in the legs.





Review of Systems





- Review of Systems


All systems: reviewed and no additional remarkable complaints except (as per 

HPI)





Past Patient History





- Infectious Disease


Hx of Infectious Diseases: None





- Tetanus Immunizations


Tetanus Immunization: Unknown





- Past Medical History & Family History


Past Medical History?: Yes





- Past Social History


Smoking Status: Former Smoker





- CARDIAC


Hx Cardiac Disorders: Yes (A fib)


Hx Hypertension: Yes





- PULMONARY


Hx Respiratory Disorders: No





- NEUROLOGICAL


Hx Neurological Disorder: Yes


Hx Seizures: Yes


Other/Comment: Epilepsy





- HEENT


Hx HEENT Problems: Yes


Other/Comment: wears glasses





- RENAL


Hx Chronic Kidney Disease: No





- ENDOCRINE/METABOLIC


Hx Diabetes Mellitus Type 2: Yes





- HEMATOLOGICAL/ONCOLOGICAL


Hx Blood Disorders: No


Other/Comment: blood infection





- INTEGUMENTARY


Hx Dermatological Problems: Yes


Other/Comment: multiple wounds on the rt leg, cellulitis in b/l extreminity. 

Left thigh cellulitis





- MUSCULOSKELETAL/RHEUMATOLOGICAL


Hx Falls: Yes





- GASTROINTESTINAL


Hx Gastrointestinal Disorders: Yes





- GENITOURINARY/GYNECOLOGICAL


Hx Reproductive Disorders: No





- PSYCHIATRIC


Hx Psychophysiologic Disorder: Yes


Hx Substance Use: No


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





- SURGICAL HISTORY


Hx Amputation: No


Hx Appendectomy: No


Hx Cholecystectomy: No


Hx Gastric Bypass Surgery: No


Hx Hysterectomy: No


Hx Joint Replacement: No


Hx Kidney Transplant: No


Hx Liver Transplant: No


Hx Mastectomy: No


Hx Musculoskeletal Surgery: No


Hx Open Heart Surgery: No


Hx Orthopedic Surgery: No


Hx Splenectomy: No


Hx Valve Replacement: No





- ANESTHESIA


Hx Anesthesia: Yes


Hx Anesthesia Reactions: No


Hx Malignant Hyperthermia: No





Meds


Allergies/Adverse Reactions: 


                                    Allergies











Allergy/AdvReac Type Severity Reaction Status Date / Time


 


piperacillin Allergy  ANAPHYLAXIS Verified 11/09/18 23:26


 


tazobactam [From Zosyn] Allergy  ANAPHYLAXIS Verified 11/10/18 04:15














- Medications


Medications: 


                               Current Medications





Apixaban (Eliquis)  2.5 mg PO BID [facility]; Protocol


Atorvastatin Calcium (Lipitor)  40 mg PO HS [facility]; Protocol


   Last Admin: 11/13/18 22:03 Dose:  40 mg


Betamethasone/Clotrimazole (Lotrisone)  0 ml TOP BID [facility]


Furosemide (Lasix)  20 mg PO DAILY [facility]; Protocol


Gabapentin (Neurontin)  400 mg PO TID [facility]; Protocol


Glipizide (Glucotrol)  10 mg PO BID [facility]; Protocol


Cefepime HCl (Maxipime 1gm)  1 gm in 100 mls @ 100 mls/hr IVPB Q8 [facility]; Protocol


   Last Admin: 11/14/18 04:59 Dose:  100 mls/hr


Vancomycin HCl (Vancomycin 1gm)  1 gm in 250 mls @ 167 mls/hr IVPB 0600,1800 

[facility]; Protocol


   Last Admin: 11/14/18 05:00 Dose:  167 mls/hr


Insulin Human Regular (Humulin R High)  0 units SC ACHS [facility]; Protocol


   Last Admin: 11/14/18 06:35 Dose:  4 unit


Multivitamins/Minerals (Therapeutic-M Tab)  1 tab PO 0800 [facility]


Nystatin (Nystop Topical Powder)  0 gm TOP BID [facility]


Ramipril (Altace)  10 mg PO DAILY [facility]; Protocol











Physical Exam





- Constitutional


Appears: No Acute Distress, Chronically Ill





- Head Exam


Head Exam: NORMAL INSPECTION





- Respiratory Exam


Respiratory Exam: Decreased Breath Sounds





- Cardiovascular Exam


Cardiovascular Exam: +S1, +S2





- GI/Abdominal Exam


GI & Abdominal Exam: Soft.  absent: Tenderness





- Extremities Exam


Additional comments: 





both lower extremities with dressings in place





Results





- Vital Signs


Recent Vital Signs: 


                                Last Vital Signs











Temp  97.9 F   11/14/18 05:54


 


Pulse  56 L  11/14/18 05:54


 


Resp  20   11/14/18 05:54


 


BP  151/87 H  11/14/18 05:54


 


Pulse Ox  96   11/14/18 05:54














- Labs


Labs: 


                         Laboratory Results - last 24 hr











  11/14/18





  05:20


 


POC Glucose (mg/dL)  218 H














Assessment & Plan





- Assessment and Plan (Free Text)


Plan: 





Assessment


consider right lower extremity infected chronic wounds


history of sepsis due to right leg skin and skin structure infection associated 

with chronic leg ulcers, clinically improved and S/P treatment


history of MRSA and Klebsiella oxytoca right leg cellulitis


history of sepsis secondary to right leg skin/skin structure infection with 

chronic leg ulcers, growing MRSA and sensitive Klebsiella 


history of MRSA cellulitis Sept. 2016


history of Bilateral lower extremity skin and skin structure infection 

(Enterobacter, Klebsiella Oxytoca and Group B Strep, as well as MRSA) which was 

treated 9/2015; MRSA and Pseudomonas cellulitis of left leg in Dec 2015, May and

June 2016


chronic lymphedema of the lower extremities


Morbid obesity with BMI 35


HTN


DM


history of Strep bacteremia


history of hernia surgery


Learning disability





Plan


continue Vancomycin and Cefepime (patient is tolerating Cefepime without rash, 

no respiratory distress, no hives) day 5 for 7-10 days 


surgery doing local wound care


will continue to monitor clinically

## 2018-11-15 PROCEDURE — F07Z8FZ TRANSFER TRAINING TREATMENT USING ASSISTIVE, ADAPTIVE, SUPPORTIVE OR PROTECTIVE EQUIPMENT: ICD-10-PCS | Performed by: INTERNAL MEDICINE

## 2018-11-15 PROCEDURE — F07L6YZ THERAPEUTIC EXERCISE TREATMENT OF MUSCULOSKELETAL SYSTEM - LOWER BACK / LOWER EXTREMITY USING OTHER EQUIPMENT: ICD-10-PCS | Performed by: INTERNAL MEDICINE

## 2018-11-15 PROCEDURE — F07Z9FZ GAIT TRAINING/FUNCTIONAL AMBULATION TREATMENT USING ASSISTIVE, ADAPTIVE, SUPPORTIVE OR PROTECTIVE EQUIPMENT: ICD-10-PCS | Performed by: INTERNAL MEDICINE

## 2018-11-15 RX ADMIN — INSULIN HUMAN SCH UNIT: 100 INJECTION, SOLUTION PARENTERAL at 17:29

## 2018-11-15 RX ADMIN — MULTIPLE VITAMINS W/ MINERALS TAB SCH TAB: TAB at 08:25

## 2018-11-15 RX ADMIN — INSULIN HUMAN SCH UNIT: 100 INJECTION, SOLUTION PARENTERAL at 06:31

## 2018-11-15 RX ADMIN — NYSTATIN SCH APPLIC: 100000 POWDER TOPICAL at 17:32

## 2018-11-15 RX ADMIN — CEFEPIME SCH MLS/HR: 1 INJECTION, SOLUTION INTRAVENOUS at 14:03

## 2018-11-15 RX ADMIN — NYSTATIN SCH APPLIC: 100000 POWDER TOPICAL at 10:10

## 2018-11-15 RX ADMIN — CLOTRIMAZOLE AND BETAMETHASONE DIPROPIONATE SCH APPLIC: 10; .5 LOTION TOPICAL at 10:10

## 2018-11-15 RX ADMIN — CLOTRIMAZOLE AND BETAMETHASONE DIPROPIONATE SCH APPLIC: 10; .5 LOTION TOPICAL at 17:32

## 2018-11-15 RX ADMIN — CEFEPIME SCH MLS/HR: 1 INJECTION, SOLUTION INTRAVENOUS at 05:29

## 2018-11-15 RX ADMIN — VANCOMYCIN HYDROCHLORIDE SCH MLS/HR: 1 INJECTION, POWDER, LYOPHILIZED, FOR SOLUTION INTRAVENOUS at 06:26

## 2018-11-15 RX ADMIN — CEFEPIME SCH MLS/HR: 1 INJECTION, SOLUTION INTRAVENOUS at 21:48

## 2018-11-15 RX ADMIN — INSULIN HUMAN SCH: 100 INJECTION, SOLUTION PARENTERAL at 21:47

## 2018-11-15 RX ADMIN — INSULIN HUMAN SCH UNIT: 100 INJECTION, SOLUTION PARENTERAL at 12:19

## 2018-11-15 RX ADMIN — VANCOMYCIN HYDROCHLORIDE SCH MLS/HR: 1 INJECTION, POWDER, LYOPHILIZED, FOR SOLUTION INTRAVENOUS at 17:32

## 2018-11-16 RX ADMIN — INSULIN HUMAN SCH: 100 INJECTION, SOLUTION PARENTERAL at 21:41

## 2018-11-16 RX ADMIN — VANCOMYCIN HYDROCHLORIDE SCH MLS/HR: 1 INJECTION, POWDER, LYOPHILIZED, FOR SOLUTION INTRAVENOUS at 05:14

## 2018-11-16 RX ADMIN — NYSTATIN SCH APPLIC: 100000 POWDER TOPICAL at 17:56

## 2018-11-16 RX ADMIN — INSULIN HUMAN SCH UNIT: 100 INJECTION, SOLUTION PARENTERAL at 06:36

## 2018-11-16 RX ADMIN — CLOTRIMAZOLE AND BETAMETHASONE DIPROPIONATE SCH APPLIC: 10; .5 LOTION TOPICAL at 10:28

## 2018-11-16 RX ADMIN — MULTIPLE VITAMINS W/ MINERALS TAB SCH TAB: TAB at 08:19

## 2018-11-16 RX ADMIN — CEFEPIME SCH MLS/HR: 1 INJECTION, SOLUTION INTRAVENOUS at 21:41

## 2018-11-16 RX ADMIN — VANCOMYCIN HYDROCHLORIDE SCH MLS/HR: 1 INJECTION, POWDER, LYOPHILIZED, FOR SOLUTION INTRAVENOUS at 17:57

## 2018-11-16 RX ADMIN — CLOTRIMAZOLE AND BETAMETHASONE DIPROPIONATE SCH: 10; .5 LOTION TOPICAL at 17:56

## 2018-11-16 RX ADMIN — CEFEPIME SCH MLS/HR: 1 INJECTION, SOLUTION INTRAVENOUS at 05:13

## 2018-11-16 RX ADMIN — INSULIN HUMAN SCH UNIT: 100 INJECTION, SOLUTION PARENTERAL at 17:54

## 2018-11-16 RX ADMIN — CEFEPIME SCH MLS/HR: 1 INJECTION, SOLUTION INTRAVENOUS at 14:29

## 2018-11-16 RX ADMIN — NYSTATIN SCH APPLIC: 100000 POWDER TOPICAL at 14:30

## 2018-11-16 RX ADMIN — INSULIN HUMAN SCH UNIT: 100 INJECTION, SOLUTION PARENTERAL at 12:24

## 2018-11-16 NOTE — CP.PCM.CON
<Cihrag Gilliam - Last Filed: 11/16/18 12:31>





History of Present Illness





- History of Present Illness


History of Present Illness: 





Podiatry consult note for Dr. Hobbs





59 yo male with pmhx of morbid obesity, chronic lower extremity cellulitis of 

the legs with skin changes, atrial fibrillation on anticoagulation, 

hypertension, dyslipidemia, mute, and nephrolithiasis seen and evaluated for 

bilateral lower extremity edema. He was admitted for lower extremity edema and 

cellulitis with draining wounds and was transfered to TCU for deconditioning and

IV abx. He states he is in no pain today and that his dressing was taken down 

yesterday to shower. He denies N/V/F/C/SOB/CP and has no acute pedal complaints 

today.





PMHx - morbid obesity, chronic lower extremity cellulitis of the legs with skin 

changes, atrial fibrillation on anticoagulation, hypertension, dyslipidemia, 

mute, and nephrolithiasis 


PSHx- inguinal hernia repair


All - piperacillin, tazobactam





Past Patient History





- Infectious Disease


Hx of Infectious Diseases: None





- Tetanus Immunizations


Tetanus Immunization: Unknown





- Past Medical History & Family History


Past Medical History?: Yes





- Past Social History


Smoking Status: Former Smoker





- CARDIAC


Hx Cardiac Disorders: Yes (A fib)


Hx Hypertension: Yes





- PULMONARY


Hx Respiratory Disorders: No





- NEUROLOGICAL


Hx Neurological Disorder: Yes


Hx Seizures: Yes


Other/Comment: Epilepsy





- HEENT


Hx HEENT Problems: Yes


Other/Comment: wears glasses





- RENAL


Hx Chronic Kidney Disease: No





- ENDOCRINE/METABOLIC


Hx Diabetes Mellitus Type 2: Yes





- HEMATOLOGICAL/ONCOLOGICAL


Hx Blood Disorders: No


Other/Comment: blood infection





- INTEGUMENTARY


Hx Dermatological Problems: Yes


Other/Comment: multiple wounds on the rt leg, cellulitis in b/l extreminity. 

Left thigh cellulitis





- MUSCULOSKELETAL/RHEUMATOLOGICAL


Hx Falls: Yes





- GASTROINTESTINAL


Hx Gastrointestinal Disorders: Yes





- GENITOURINARY/GYNECOLOGICAL


Hx Reproductive Disorders: No





- PSYCHIATRIC


Hx Psychophysiologic Disorder: Yes


Hx Substance Use: No


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





- SURGICAL HISTORY


Hx Amputation: No


Hx Appendectomy: No


Hx Cholecystectomy: No


Hx Gastric Bypass Surgery: No


Hx Hysterectomy: No


Hx Joint Replacement: No


Hx Kidney Transplant: No


Hx Liver Transplant: No


Hx Mastectomy: No


Hx Musculoskeletal Surgery: No


Hx Open Heart Surgery: No


Hx Orthopedic Surgery: No


Hx Splenectomy: No


Hx Valve Replacement: No





- ANESTHESIA


Hx Anesthesia: Yes


Hx Anesthesia Reactions: No


Hx Malignant Hyperthermia: No





Meds


Allergies/Adverse Reactions: 


                                    Allergies











Allergy/AdvReac Type Severity Reaction Status Date / Time


 


piperacillin Allergy  ANAPHYLAXIS Verified 11/09/18 23:26


 


tazobactam [From Zosyn] Allergy  ANAPHYLAXIS Verified 11/10/18 04:15














- Medications


Medications: 


                               Current Medications





Apixaban (Eliquis)  2.5 mg PO BID [facility]E; Protocol


   Last Admin: 11/15/18 17:31 Dose:  2.5 mg


Atorvastatin Calcium (Lipitor)  40 mg PO HS [facility]; Protocol


   Last Admin: 11/15/18 21:48 Dose:  40 mg


Betamethasone/Clotrimazole (Lotrisone)  0 ml TOP BID [facility]


   Last Admin: 11/15/18 17:32 Dose:  1 applic


Furosemide (Lasix)  20 mg PO DAILY [facility]; Protocol


   Last Admin: 11/15/18 10:09 Dose:  20 mg


Gabapentin (Neurontin)  400 mg PO TID [facility]; Protocol


   Last Admin: 11/15/18 17:32 Dose:  400 mg


Glipizide (Glucotrol)  10 mg PO BID [facility]; Protocol


   Last Admin: 11/15/18 17:31 Dose:  10 mg


Cefepime HCl (Maxipime 1gm)  1 gm in 100 mls @ 100 mls/hr IVPB Q8 [facility]; Protocol


   Last Admin: 11/16/18 05:13 Dose:  100 mls/hr


Vancomycin HCl (Vancomycin 1gm)  1 gm in 250 mls @ 167 mls/hr IVPB 0600,1800 

[facility]; Protocol


   Last Admin: 11/16/18 05:14 Dose:  167 mls/hr


Insulin Human Regular (Humulin R High)  0 units SC ACHS [facility]; Protocol


   Last Admin: 11/16/18 06:36 Dose:  4 unit


Multivitamins/Minerals (Therapeutic-M Tab)  1 tab PO 0800 [facility]


   Last Admin: 11/16/18 08:19 Dose:  1 tab


Nystatin (Nystop Topical Powder)  0 gm TOP BID [facility]E


   Last Admin: 11/15/18 17:32 Dose:  1 applic


Ramipril (Altace)  10 mg PO DAILY [facility]; Protocol


   Last Admin: 11/15/18 10:09 Dose:  10 mg











Physical Exam





- Constitutional


Appears: Well, Non-toxic, No Acute Distress





- Head Exam


Head Exam: ATRAUMATIC, NORMOCEPHALIC





- Extremities Exam


Additional comments: 





Vasc: DP 2/4, PT pulse is non palpable 2ry to edema . Temperature gradient warm 

to warm from proximal to distal. Cap refill < 3 sec to all digits. Diffuse non 

pitting pedal edema noted from tibial tuberosity to digits





Neuro: Protective and gross sensations are grossly intact





Derm: Diffuse elephantiasis skin changes with lichnification and wart like skin 

lesions secondary scaling noted to the foot and leg up to the level of the 

tibial tuberosity. Superficial ulceration improved on LLE superior to the medial

malleolus, no purulence, Mild malodor, no fluctuance. No clinical suspicion for 

infection. Slight erythema noted.





MSK: No tenderness to posterior calf. No tenderness to palpation. Muscle power 

intact 5/5 in all groups.





- Neurological Exam


Neurological exam: Alert, Oriented x3





- Psychiatric Exam


Psychiatric exam: Normal Affect, Normal Mood





Results





- Vital Signs


Recent Vital Signs: 


                                Last Vital Signs











Temp  98.7 F   11/15/18 16:00


 


Pulse  58 L  11/15/18 16:00


 


Resp  18   11/15/18 16:00


 


BP  149/79   11/15/18 16:00


 


Pulse Ox  95   11/15/18 16:00














- Labs


Labs: 


                         Laboratory Results - last 24 hr











  11/15/18 11/15/18





  05:46 11:05


 


POC Glucose (mg/dL)  247 H  221 H














Assessment & Plan





- Assessment and Plan (Free Text)


Assessment: 





59 y/o diabetic male with bilateral lower extremity lymphedema,  Left leg 

cellulitis and stasis ulceration


Plan: 





Patient seen and evaluated at bedside


Discussed in detail with Dr. Hobbs


Labs and vitals reviewed- afebrile, WBC 9.9 (11/9)


ID on board: Recommendations appreciated


Lotrisone lotion applied to the LLE.


Nystatin powder applied between folds of legs and behind the left knee


Unna boot applied b/l with coban


Continue PT


Podiatry will follow up the patient while in house





- Date & Time


Date: 11/16/18


Time: 09:42





<Manuel Hobbs - Last Filed: 11/16/18 19:03>





Meds





- Medications


Medications: 


                               Current Medications





Apixaban (Eliquis)  2.5 mg PO BID [facility]; Protocol


   Last Admin: 11/16/18 17:54 Dose:  2.5 mg


Atorvastatin Calcium (Lipitor)  40 mg PO HS [facility]; Protocol


   Last Admin: 11/15/18 21:48 Dose:  40 mg


Betamethasone/Clotrimazole (Lotrisone)  0 ml TOP BID [facility]


   Last Admin: 11/16/18 17:56 Dose:  Not Given


Furosemide (Lasix)  20 mg PO DAILY [facility]; Protocol


   Last Admin: 11/16/18 10:45 Dose:  20 mg


Gabapentin (Neurontin)  400 mg PO TID [facility]; Protocol


   Last Admin: 11/16/18 17:56 Dose:  400 mg


Glipizide (Glucotrol)  10 mg PO BID [facility]; Protocol


   Last Admin: 11/16/18 17:54 Dose:  10 mg


Cefepime HCl (Maxipime 1gm)  1 gm in 100 mls @ 100 mls/hr IVPB Q8 [facility]; Protocol


   Last Admin: 11/16/18 14:29 Dose:  100 mls/hr


Vancomycin HCl (Vancomycin 1gm)  1 gm in 250 mls @ 167 mls/hr IVPB 0600,1800 

[facility]; Protocol


   Last Admin: 11/16/18 17:57 Dose:  167 mls/hr


Insulin Human Regular (Humulin R High)  0 units SC ACHS [facility]; Protocol


   Last Admin: 11/16/18 17:54 Dose:  2 unit


Multivitamins/Minerals (Therapeutic-M Tab)  1 tab PO 0800 [facility]


   Last Admin: 11/16/18 08:19 Dose:  1 tab


Nystatin (Nystop Topical Powder)  0 gm TOP BID [facility]


   Last Admin: 11/16/18 17:56 Dose:  1 applic


Ramipril (Altace)  10 mg PO DAILY [facility]; Protocol


   Last Admin: 11/16/18 10:45 Dose:  10 mg











Results





- Vital Signs


Recent Vital Signs: 


                                Last Vital Signs











Temp  98.5 F   11/16/18 16:00


 


Pulse  63   11/16/18 16:00


 


Resp  18   11/16/18 16:00


 


BP  133/67   11/16/18 16:00


 


Pulse Ox  96   11/16/18 16:00














- Labs


Labs: 


                         Laboratory Results - last 24 hr











  11/15/18 11/15/18 11/16/18





  17:10 21:46 04:59


 


POC Glucose (mg/dL)  197 H  211 H  221 H














  11/16/18 11/16/18





  11:53 16:41


 


POC Glucose (mg/dL)  224 H  177 H














Attending/Attestation





- Attestation


I have personally seen and examined this patient.: Yes


I have fully participated in the care of the patient.: Yes


I have reviewed all pertinent clinical information: Yes

## 2018-11-16 NOTE — CP.PCM.PN
Subjective





- Date & Time of Evaluation


Date of Evaluation: 11/16/18


Time of Evaluation: 09:45





- Subjective


Subjective: 





Comfortable, afebrile, not in distress.





Objective





- Vital Signs/Intake and Output


Vital Signs (last 24 hours): 


                                        











Temp Pulse Resp BP Pulse Ox


 


 97.9 F   56 L  20   183/97 H  96 


 


 11/14/18 05:54  11/14/18 05:54  11/14/18 05:54  11/14/18 09:42  11/14/18 05:54











- Medications


Medications: 


                               Current Medications





Apixaban (Eliquis)  2.5 mg PO BID [facility]; Protocol


   Last Admin: 11/14/18 09:42 Dose:  2.5 mg


Atorvastatin Calcium (Lipitor)  40 mg PO HS [facility]; Protocol


   Last Admin: 11/13/18 22:03 Dose:  40 mg


Betamethasone/Clotrimazole (Lotrisone)  0 ml TOP BID [facility]


   Last Admin: 11/14/18 10:29 Dose:  1 applic


Furosemide (Lasix)  20 mg PO DAILY [facility]; Protocol


   Last Admin: 11/14/18 09:42 Dose:  20 mg


Gabapentin (Neurontin)  400 mg PO TID [facility]; Protocol


   Last Admin: 11/14/18 13:49 Dose:  400 mg


Glipizide (Glucotrol)  10 mg PO BID [facility]; Protocol


   Last Admin: 11/14/18 09:42 Dose:  10 mg


Cefepime HCl (Maxipime 1gm)  1 gm in 100 mls @ 100 mls/hr IVPB Q8 [facility]; Protocol


   Last Admin: 11/14/18 13:49 Dose:  100 mls/hr


Vancomycin HCl (Vancomycin 1gm)  1 gm in 250 mls @ 167 mls/hr IVPB 0600,1800 

[facility]; Protocol


   Last Admin: 11/14/18 05:00 Dose:  167 mls/hr


Insulin Human Regular (Humulin R High)  0 units SC ACHS [facility]; Protocol


   Last Admin: 11/14/18 12:25 Dose:  7 unit


Multivitamins/Minerals (Therapeutic-M Tab)  1 tab PO 0800 [facility]


   Last Admin: 11/14/18 07:52 Dose:  1 tab


Nystatin (Nystop Topical Powder)  0 gm TOP BID [facility]


   Last Admin: 11/14/18 10:29 Dose:  1 applic


Ramipril (Altace)  10 mg PO DAILY [facility]; Protocol


   Last Admin: 11/14/18 09:41 Dose:  10 mg











- Constitutional


Appears: Chronically Ill





- Head Exam


Head Exam: NORMAL INSPECTION





- Respiratory Exam


Respiratory Exam: Decreased Breath Sounds





- Cardiovascular Exam


Cardiovascular Exam: +S1, +S2





- GI/Abdominal Exam


GI & Abdominal Exam: Soft.  absent: Tenderness





- Extremities Exam


Additional comments: 





both legs with dressings in place





Assessment and Plan





- Assessment and Plan (Free Text)


Plan: 








Assessment


consider right lower extremity infected chronic wounds


history of sepsis due to right leg skin and skin structure infection associated 

with chronic leg ulcers, clinically improved and S/P treatment


history of MRSA and Klebsiella oxytoca right leg cellulitis


history of sepsis secondary to right leg skin/skin structure infection with 

chronic leg ulcers, growing MRSA and sensitive Klebsiella 


history of MRSA cellulitis Sept. 2016


history of Bilateral lower extremity skin and skin structure infection 

(Enterobacter, Klebsiella Oxytoca and Group B Strep, as well as MRSA) which was 

treated 9/2015; MRSA and Pseudomonas cellulitis of left leg in Dec 2015, May and

June 2016


chronic lymphedema of the lower extremities


Morbid obesity with BMI 35


HTN


DM


history of Strep bacteremia


history of hernia surgery


Learning disability





Plan


continue Vancomycin and Cefepime (patient is tolerating Cefepime without rash, 

no respiratory distress, no hives) day 7 for 7-10 days 


surgery doing local wound care


will continue to monitor clinically

## 2018-11-16 NOTE — CP.PCM.PN
<Pa Hayes - Last Filed: 11/16/18 14:43>





Subjective





- Date & Time of Evaluation


Date of Evaluation: 11/16/18


Time of Evaluation: 14:39





- Subjective


Subjective: 


Pa Hayes DO, PGY-2: Progress Note for Dr. Corona


Patient was seen and examined at bedside. Patient denied any chest pain, nausea,

vomiting, diarrhea, fever, chills. No adverse events noted overnight. 





Objective





- Vital Signs/Intake and Output


Vital Signs (last 24 hours): 


                                        











Temp Pulse Resp BP Pulse Ox


 


 98.7 F   58 L  18   170/86 H  95 


 


 11/15/18 16:00  11/15/18 16:00  11/15/18 16:00  11/16/18 10:45  11/15/18 16:00











- Medications


Medications: 


                               Current Medications





Apixaban (Eliquis)  2.5 mg PO BID [facility]; Protocol


   Last Admin: 11/16/18 10:45 Dose:  2.5 mg


Atorvastatin Calcium (Lipitor)  40 mg PO HS [facility]; Protocol


   Last Admin: 11/15/18 21:48 Dose:  40 mg


Betamethasone/Clotrimazole (Lotrisone)  0 ml TOP BID [facility]


   Last Admin: 11/16/18 10:28 Dose:  1 applic


Furosemide (Lasix)  20 mg PO DAILY [facility]; Protocol


   Last Admin: 11/16/18 10:45 Dose:  20 mg


Gabapentin (Neurontin)  400 mg PO TID [facility]; Protocol


   Last Admin: 11/16/18 14:30 Dose:  400 mg


Glipizide (Glucotrol)  10 mg PO BID [facility]; Protocol


   Last Admin: 11/16/18 10:45 Dose:  10 mg


Cefepime HCl (Maxipime 1gm)  1 gm in 100 mls @ 100 mls/hr IVPB Q8 [facility]; Protocol


   Last Admin: 11/16/18 14:29 Dose:  100 mls/hr


Vancomycin HCl (Vancomycin 1gm)  1 gm in 250 mls @ 167 mls/hr IVPB 0600,1800 

[facility]; Protocol


   Last Admin: 11/16/18 05:14 Dose:  167 mls/hr


Insulin Human Regular (Humulin R High)  0 units SC ACHS [facility]; Protocol


   Last Admin: 11/16/18 12:24 Dose:  4 unit


Multivitamins/Minerals (Therapeutic-M Tab)  1 tab PO 0800 [facility]


   Last Admin: 11/16/18 08:19 Dose:  1 tab


Nystatin (Nystop Topical Powder)  0 gm TOP BID [facility]


   Last Admin: 11/16/18 14:30 Dose:  1 applic


Ramipril (Altace)  10 mg PO DAILY [facility]; Protocol


   Last Admin: 11/16/18 10:45 Dose:  10 mg











- Constitutional


Appears: Well, Non-toxic





- Head Exam


Head Exam: ATRAUMATIC, NORMOCEPHALIC





- Eye Exam


Eye Exam: EOMI, Normal appearance





- ENT Exam


ENT Exam: Mucous Membranes Moist





- Neck Exam


Neck Exam: Normal Inspection





- Respiratory Exam


Respiratory Exam: Clear to Ausculation Bilateral, NORMAL BREATHING PATTERN.  

absent: Accessory Muscle Use





- Cardiovascular Exam


Cardiovascular Exam: RRR, +S1, +S2





- GI/Abdominal Exam


GI & Abdominal Exam: Soft, Normal Bowel Sounds





- Extremities Exam


Additional comments: 





elephatiasis with superficial warts





- Neurological Exam


Neurological Exam: Alert, Awake, Oriented x3





- Psychiatric Exam


Psychiatric exam: Normal Affect, Normal Mood





- Skin


Skin Exam: Dry, Intact, Normal Color, Warm





Assessment and Plan





- Assessment and Plan (Free Text)


Assessment: 


 58 year old male with a past medical history of morbid obesity, chronic lower 

extremity cellulitis of the legs with skin changes, atrial fibrillation on 

anticoagulation, hypertension, dyslipidemia, mute, and nephrloithiasis who 

presented with left foot and leg ulceration and cellulitis who is now admitted 

to the TCU for physical therapy for his gait instability and deconditioning as 

well and IV antibiotics





1) Cellulitis of left foot/leg in a patient with chronic lower extremity 

lymphedema


- Cefepime 1 gram q8h [facility]


- Vancomycin 1 gram q12h


- ID is following, appreciate recommendations


- patient is on day 7 of antibiotics


- Podiatry for wound care


- Lasix 20 mg PO daily


- Lotrisone cream BID


- Multivitamin daily





2) Atrial Fibrillation 


- Eliquis 2.5 mg BID





3) Hypertension


- Ramipiril 10 mg daily





4) DM II


- Humulin ISS high with FSBG ACHS


- Glipizide 10 mg BID





5) Dyslipidemia


- Atorvastatin 40 mg HS





6) Fungal infection of leg and groin


- - Lotrisone cream BID


- Nystatin topical powder BID





7) Peripheral Neuropathy


- Gabapentin 400 mg TID





8) DVT/GI prophylaxis


- not indicated as patient is ambulating and no at risk for stomach ulcers





Case reviewed and discussed with attending physician, Dr. Corona








<Delbert Corona - Last Filed: 11/16/18 20:17>





Objective





- Vital Signs/Intake and Output


Vital Signs (last 24 hours): 


                                        











Temp Pulse Resp BP Pulse Ox


 


 98.5 F   63   18   133/67   96 


 


 11/16/18 16:00  11/16/18 16:00  11/16/18 16:00  11/16/18 16:00  11/16/18 16:00











- Medications


Medications: 


                               Current Medications





Apixaban (Eliquis)  2.5 mg PO BID [facility]; Protocol


   Last Admin: 11/16/18 17:54 Dose:  2.5 mg


Atorvastatin Calcium (Lipitor)  40 mg PO HS [facility]; Protocol


   Last Admin: 11/15/18 21:48 Dose:  40 mg


Betamethasone/Clotrimazole (Lotrisone)  0 ml TOP BID [facility]


   Last Admin: 11/16/18 17:56 Dose:  Not Given


Furosemide (Lasix)  20 mg PO DAILY [facility]; Protocol


   Last Admin: 11/16/18 10:45 Dose:  20 mg


Gabapentin (Neurontin)  400 mg PO TID [facility]; Protocol


   Last Admin: 11/16/18 17:56 Dose:  400 mg


Glipizide (Glucotrol)  10 mg PO BID [facility]; Protocol


   Last Admin: 11/16/18 17:54 Dose:  10 mg


Cefepime HCl (Maxipime 1gm)  1 gm in 100 mls @ 100 mls/hr IVPB Q8 [facility]; Protocol


   Last Admin: 11/16/18 14:29 Dose:  100 mls/hr


Vancomycin HCl (Vancomycin 1gm)  1 gm in 250 mls @ 167 mls/hr IVPB 0600,1800 

[facility]; Protocol


   Last Admin: 11/16/18 17:57 Dose:  167 mls/hr


Insulin Human Regular (Humulin R High)  0 units SC ACHS [facility]; Protocol


   Last Admin: 11/16/18 17:54 Dose:  2 unit


Multivitamins/Minerals (Therapeutic-M Tab)  1 tab PO 0800 [facility]


   Last Admin: 11/16/18 08:19 Dose:  1 tab


Nystatin (Nystop Topical Powder)  0 gm TOP BID [facility]


   Last Admin: 11/16/18 17:56 Dose:  1 applic


Ramipril (Altace)  10 mg PO DAILY [facility]; Protocol


   Last Admin: 11/16/18 10:45 Dose:  10 mg











Assessment and Plan





- Assessment and Plan (Free Text)


Assessment: 





Pt seen and examined. I have reviewed the note of the medical resident and agree

with it. I have discussed the assessment and plan with the resident.  I have 

reviewed the patient's labs and medications. Pt with cellulits and is on IV Abx.

His DM-2 is controlled with Glipizide. Pt on Atorvastatin for dyslipidemia. Pt 

will continue shila Peripheral   neuropathy.

## 2018-11-17 RX ADMIN — CEFEPIME SCH MLS/HR: 1 INJECTION, SOLUTION INTRAVENOUS at 13:50

## 2018-11-17 RX ADMIN — INSULIN HUMAN SCH UNIT: 100 INJECTION, SOLUTION PARENTERAL at 06:53

## 2018-11-17 RX ADMIN — INSULIN HUMAN SCH: 100 INJECTION, SOLUTION PARENTERAL at 22:17

## 2018-11-17 RX ADMIN — VANCOMYCIN HYDROCHLORIDE SCH MLS/HR: 1 INJECTION, POWDER, LYOPHILIZED, FOR SOLUTION INTRAVENOUS at 05:22

## 2018-11-17 RX ADMIN — INSULIN HUMAN SCH UNIT: 100 INJECTION, SOLUTION PARENTERAL at 12:12

## 2018-11-17 RX ADMIN — CEFEPIME SCH MLS/HR: 1 INJECTION, SOLUTION INTRAVENOUS at 21:21

## 2018-11-17 RX ADMIN — CLOTRIMAZOLE AND BETAMETHASONE DIPROPIONATE SCH: 10; .5 LOTION TOPICAL at 17:48

## 2018-11-17 RX ADMIN — CLOTRIMAZOLE AND BETAMETHASONE DIPROPIONATE SCH APPLIC: 10; .5 LOTION TOPICAL at 09:03

## 2018-11-17 RX ADMIN — NYSTATIN SCH APPLIC: 100000 POWDER TOPICAL at 09:04

## 2018-11-17 RX ADMIN — VANCOMYCIN HYDROCHLORIDE SCH MLS/HR: 1 INJECTION, POWDER, LYOPHILIZED, FOR SOLUTION INTRAVENOUS at 17:49

## 2018-11-17 RX ADMIN — NYSTATIN SCH APPLIC: 100000 POWDER TOPICAL at 17:49

## 2018-11-17 RX ADMIN — INSULIN HUMAN SCH UNIT: 100 INJECTION, SOLUTION PARENTERAL at 17:46

## 2018-11-17 RX ADMIN — MULTIPLE VITAMINS W/ MINERALS TAB SCH TAB: TAB at 08:32

## 2018-11-17 RX ADMIN — CEFEPIME SCH MLS/HR: 1 INJECTION, SOLUTION INTRAVENOUS at 05:22

## 2018-11-17 NOTE — CP.PCM.PN
<Alee Calvertgissell - Last Filed: 11/17/18 09:25>





Subjective





- Date & Time of Evaluation


Date of Evaluation: 11/17/18


Time of Evaluation: 09:25





- Subjective


Subjective: 


Progress note for Dr. Hobbs





Patient was seen and bedside. Unna boots were intact








Objective





- Vital Signs/Intake and Output


Vital Signs (last 24 hours): 


                                        











Temp Pulse Resp BP Pulse Ox


 


 98.5 F   63   18   155/78 H  96 


 


 11/16/18 16:00  11/16/18 16:00  11/16/18 16:00  11/17/18 09:03  11/16/18 16:00











- Medications


Medications: 


                               Current Medications





Apixaban (Eliquis)  2.5 mg PO BID [facility]; Protocol


   Last Admin: 11/17/18 09:03 Dose:  2.5 mg


Atorvastatin Calcium (Lipitor)  40 mg PO HS [facility]; Protocol


   Last Admin: 11/16/18 21:41 Dose:  40 mg


Bandage/Support Products (Unna-Flex Elastic Unna Boot)  1 dev TOP ONCE ONE


   Stop: 11/17/18 09:24


Betamethasone/Clotrimazole (Lotrisone)  0 ml TOP BID [facility]


   Last Admin: 11/17/18 09:03 Dose:  1 applic


Furosemide (Lasix)  20 mg PO DAILY [facility]; Protocol


   Last Admin: 11/17/18 09:03 Dose:  20 mg


Gabapentin (Neurontin)  400 mg PO TID [facility]; Protocol


   Last Admin: 11/17/18 09:03 Dose:  400 mg


Glipizide (Glucotrol)  10 mg PO BID [facility]; Protocol


   Last Admin: 11/17/18 09:03 Dose:  10 mg


Cefepime HCl (Maxipime 1gm)  1 gm in 100 mls @ 100 mls/hr IVPB Q8 [facility]; Protocol


   Last Admin: 11/17/18 05:22 Dose:  100 mls/hr


Vancomycin HCl (Vancomycin 1gm)  1 gm in 250 mls @ 167 mls/hr IVPB 0600,1800 

[facility]; Protocol


   Last Admin: 11/17/18 05:22 Dose:  167 mls/hr


Insulin Human Regular (Humulin R High)  0 units SC ACHS [facility]; Protocol


   Last Admin: 11/17/18 06:53 Dose:  2 unit


Multivitamins/Minerals (Therapeutic-M Tab)  1 tab PO 0800 [facility]


   Last Admin: 11/17/18 08:32 Dose:  1 tab


Nystatin (Nystop Topical Powder)  0 gm TOP BID [facility]


   Last Admin: 11/17/18 09:04 Dose:  1 applic


Ramipril (Altace)  10 mg PO DAILY [facility]; Protocol


   Last Admin: 11/17/18 09:03 Dose:  10 mg











- Constitutional


Appears: Well, Non-toxic, No Acute Distress





- Head Exam


Head Exam: ATRAUMATIC, NORMOCEPHALIC





- Extremities Exam


Additional comments: 


Unna boots C/D/I








- Neurological Exam


Neurological Exam: Alert, Awake, Oriented x3





- Psychiatric Exam


Psychiatric exam: Normal Affect, Normal Mood





Assessment and Plan





- Assessment and Plan (Free Text)


Assessment: 


59 y/o diabetic male with bilateral lower extremity lymphedema,  Left leg 

cellulitis and stasis ulceration





Plan: 


Patient seen and evaluated at bedside


Discussed in detail with Dr. Hobbs


Labs and vitals reviewed- afebrile, WBC 9.9 (11/9)


ID on board: Recommendations appreciated


Continue PT


Podiatry will follow up the patient while in house








<Manuel Hobbs - Last Filed: 11/20/18 13:00>





Objective





- Vital Signs/Intake and Output


Vital Signs (last 24 hours): 


                                        











Temp Pulse Resp BP Pulse Ox


 


 98 F   66   18   139/75   95 


 


 11/19/18 16:00  11/19/18 16:00  11/19/18 16:00  11/20/18 09:41  11/19/18 16:00











- Medications


Medications: 


                               Current Medications





Apixaban (Eliquis)  2.5 mg PO BID [facility]; Protocol


   Last Admin: 11/20/18 09:41 Dose:  2.5 mg


Atorvastatin Calcium (Lipitor)  40 mg PO HS [facility]; Protocol


   Last Admin: 11/19/18 21:13 Dose:  40 mg


Betamethasone/Clotrimazole (Lotrisone)  0 ml TOP BID [facility]


   Last Admin: 11/20/18 09:42 Dose:  1 applic


Furosemide (Lasix)  20 mg PO DAILY [facility]; Protocol


   Last Admin: 11/20/18 09:41 Dose:  20 mg


Gabapentin (Neurontin)  400 mg PO TID [facility]; Protocol


   Last Admin: 11/20/18 09:41 Dose:  400 mg


Glipizide (Glucotrol)  10 mg PO BID [facility]; Protocol


   Last Admin: 11/20/18 09:41 Dose:  10 mg


Insulin Human Regular (Humulin R High)  0 units SC ACHS [facility]; Protocol


   Last Admin: 11/20/18 12:01 Dose:  4 unit


Multivitamins/Minerals (Therapeutic-M Tab)  1 tab PO 0800 [facility]


   Last Admin: 11/20/18 09:00 Dose:  1 tab


Nystatin (Nystop Topical Powder)  0 gm TOP BID [facility]


   Last Admin: 11/20/18 09:42 Dose:  1 applic


Ramipril (Altace)  10 mg PO DAILY [facility]; Protocol


   Last Admin: 11/20/18 09:42 Dose:  10 mg











Attending/Attestation





- Attestation


I have personally seen and examined this patient.: Yes


I have fully participated in the care of the patient.: Yes


I have reviewed all pertinent clinical information, including history, physical 

exam and plan: Yes

## 2018-11-17 NOTE — CP.PCM.PCO
Physician Communication Note





- Physician Communication Note


Physician Communication Note: Patient was seen at bedside. Unnaboot was intact 

and clean B/L

## 2018-11-17 NOTE — PN
DATE:  11/17/2018



SUBJECTIVE:  The patient is 58 years old, seen and examined, sitting in

chair.  Was admitted because of worsening bilateral leg wound infection. 

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  Offered no complaint.



PHYSICAL EXAMINATION:

VITAL SIGNS:  He is afebrile, pulse 60, respirations 20, blood pressure

148/73.

LUNGS:  Bilateral fair airflow.  No rhonchi or crackle.

HEART:  S1 and S2 audible.

ABDOMEN:  Soft, obese, nontender.  No rebound.  No guarding.

NEUROLOGICAL:  He is awake and alert, slow to respond.

EXTREMITIES:  Bilateral leg, chronic stasis dermatitis.  Foul-smelling

wound discharge from both legs.  The legs are wrapped.  Wound care is being

done with the Podiatry team.



LABORATORY EXAM:  Blood sugar is 213.



ASSESSMENT:

1.  Morbid obesity.

2.  Chronic stasis dermatitis.

3.  Bilateral leg cellulitis.

4.  Chronic atrial fibrillation.

5.  Hypertension.

6.  Hyperlipidemia.

7.  Diabetic neuropathy.



PLAN:  The patient's MAR reviewed.  Found to be appropriate.  Medications

renewed.  Monitor blood sugar.  Currently, the patient is on IV vancomycin.

He is on statin.  His blood sugar is being monitored.  He is on Eliquis. 

Encourage physical therapy.  We will follow up in the a.m.





__________________________________________

Yolanda Conroy MD





DD:  11/17/2018 17:25:56

DT:  11/17/2018 17:50:11

Job # [id number]

## 2018-11-17 NOTE — CP.PCM.PN
Subjective





- Date & Time of Evaluation


Date of Evaluation: 11/17/18


Time of Evaluation: 09:40





- Subjective


Subjective: 





Comfortable, no fevers.





Objective





- Vital Signs/Intake and Output


Vital Signs (last 24 hours): 


                                        











Temp Pulse Resp BP Pulse Ox


 


 98.7 F   58 L  18   170/86 H  95 


 


 11/15/18 16:00  11/15/18 16:00  11/15/18 16:00  11/16/18 10:45  11/15/18 16:00











- Medications


Medications: 


                               Current Medications





Apixaban (Eliquis)  2.5 mg PO BID [facility]; Protocol


   Last Admin: 11/16/18 10:45 Dose:  2.5 mg


Atorvastatin Calcium (Lipitor)  40 mg PO HS [facility]; Protocol


   Last Admin: 11/15/18 21:48 Dose:  40 mg


Betamethasone/Clotrimazole (Lotrisone)  0 ml TOP BID [facility]


   Last Admin: 11/16/18 10:28 Dose:  1 applic


Furosemide (Lasix)  20 mg PO DAILY [facility]; Protocol


   Last Admin: 11/16/18 10:45 Dose:  20 mg


Gabapentin (Neurontin)  400 mg PO TID [facility]; Protocol


   Last Admin: 11/16/18 14:30 Dose:  400 mg


Glipizide (Glucotrol)  10 mg PO BID [facility]; Protocol


   Last Admin: 11/16/18 10:45 Dose:  10 mg


Cefepime HCl (Maxipime 1gm)  1 gm in 100 mls @ 100 mls/hr IVPB Q8 [facility]; Protocol


   Last Admin: 11/16/18 14:29 Dose:  100 mls/hr


Vancomycin HCl (Vancomycin 1gm)  1 gm in 250 mls @ 167 mls/hr IVPB 0600,1800 

[facility]; Protocol


   Last Admin: 11/16/18 05:14 Dose:  167 mls/hr


Insulin Human Regular (Humulin R High)  0 units SC ACHS [facility]; Protocol


   Last Admin: 11/16/18 12:24 Dose:  4 unit


Multivitamins/Minerals (Therapeutic-M Tab)  1 tab PO 0800 [facility]


   Last Admin: 11/16/18 08:19 Dose:  1 tab


Nystatin (Nystop Topical Powder)  0 gm TOP BID [facility]


   Last Admin: 11/16/18 14:30 Dose:  1 applic


Ramipril (Altace)  10 mg PO DAILY [facility]; Protocol


   Last Admin: 11/16/18 10:45 Dose:  10 mg











- Constitutional


Appears: Chronically Ill





- Head Exam


Head Exam: NORMAL INSPECTION





- Respiratory Exam


Respiratory Exam: Decreased Breath Sounds





- Cardiovascular Exam


Cardiovascular Exam: +S1, +S2





- GI/Abdominal Exam


GI & Abdominal Exam: Soft.  absent: Tenderness





- Extremities Exam


Additional comments: 





both legs with dressings in place





Assessment and Plan





- Assessment and Plan (Free Text)


Plan: 


Assessment


consider right lower extremity infected chronic wounds


history of sepsis due to right leg skin and skin structure infection associated 

with chronic leg ulcers, clinically improved and S/P treatment


history of MRSA and Klebsiella oxytoca right leg cellulitis


history of sepsis secondary to right leg skin/skin structure infection with 

chronic leg ulcers, growing MRSA and sensitive Klebsiella 


history of MRSA cellulitis Sept. 2016


history of Bilateral lower extremity skin and skin structure infection 

(Enterobacter, Klebsiella Oxytoca and Group B Strep, as well as MRSA) which was 

treated 9/2015; MRSA and Pseudomonas cellulitis of left leg in Dec 2015, May and

June 2016


chronic lymphedema of the lower extremities


Morbid obesity with BMI 35


HTN


DM


history of Strep bacteremia


history of hernia surgery


Learning disability





Plan


continue Vancomycin and Cefepime (patient is tolerating Cefepime without rash, 

no respiratory distress, no hives) day 8 for 7-10 days 


surgery doing local wound care


will continue to monitor clinically

## 2018-11-18 PROCEDURE — F08Z1FZ DRESSING TECHNIQUES TREATMENT USING ASSISTIVE, ADAPTIVE, SUPPORTIVE OR PROTECTIVE EQUIPMENT: ICD-10-PCS | Performed by: INTERNAL MEDICINE

## 2018-11-18 RX ADMIN — INSULIN HUMAN SCH UNIT: 100 INJECTION, SOLUTION PARENTERAL at 17:38

## 2018-11-18 RX ADMIN — INSULIN HUMAN SCH UNIT: 100 INJECTION, SOLUTION PARENTERAL at 07:08

## 2018-11-18 RX ADMIN — NYSTATIN SCH APPLIC: 100000 POWDER TOPICAL at 10:59

## 2018-11-18 RX ADMIN — VANCOMYCIN HYDROCHLORIDE SCH MLS/HR: 1 INJECTION, POWDER, LYOPHILIZED, FOR SOLUTION INTRAVENOUS at 08:28

## 2018-11-18 RX ADMIN — INSULIN HUMAN SCH UNIT: 100 INJECTION, SOLUTION PARENTERAL at 12:30

## 2018-11-18 RX ADMIN — INSULIN HUMAN SCH: 100 INJECTION, SOLUTION PARENTERAL at 21:18

## 2018-11-18 RX ADMIN — VANCOMYCIN HYDROCHLORIDE SCH MLS/HR: 1 INJECTION, POWDER, LYOPHILIZED, FOR SOLUTION INTRAVENOUS at 21:43

## 2018-11-18 RX ADMIN — CLOTRIMAZOLE AND BETAMETHASONE DIPROPIONATE SCH APPLIC: 10; .5 LOTION TOPICAL at 10:58

## 2018-11-18 RX ADMIN — CEFEPIME SCH MLS/HR: 1 INJECTION, SOLUTION INTRAVENOUS at 06:29

## 2018-11-18 RX ADMIN — CEFEPIME SCH MLS/HR: 1 INJECTION, SOLUTION INTRAVENOUS at 14:32

## 2018-11-18 RX ADMIN — CEFEPIME SCH MLS/HR: 1 INJECTION, SOLUTION INTRAVENOUS at 21:18

## 2018-11-18 RX ADMIN — MULTIPLE VITAMINS W/ MINERALS TAB SCH TAB: TAB at 09:03

## 2018-11-18 RX ADMIN — CLOTRIMAZOLE AND BETAMETHASONE DIPROPIONATE SCH: 10; .5 LOTION TOPICAL at 17:40

## 2018-11-18 RX ADMIN — NYSTATIN SCH APPLIC: 100000 POWDER TOPICAL at 17:41

## 2018-11-18 NOTE — CP.PCM.PN
Subjective





- Date & Time of Evaluation


Date of Evaluation: 11/18/18


Time of Evaluation: 10:20





- Subjective


Subjective: 





Not in distress, non-toxic, no fevers.





Objective





- Vital Signs/Intake and Output


Vital Signs (last 24 hours): 


                                        











Temp Pulse Resp BP Pulse Ox


 


 98.5 F   63   18   155/78 H  96 


 


 11/16/18 16:00  11/16/18 16:00  11/16/18 16:00  11/17/18 09:03  11/16/18 16:00











- Medications


Medications: 


                               Current Medications





Apixaban (Eliquis)  2.5 mg PO BID [facility]; Protocol


   Last Admin: 11/17/18 09:03 Dose:  2.5 mg


Atorvastatin Calcium (Lipitor)  40 mg PO HS [facility]; Protocol


   Last Admin: 11/16/18 21:41 Dose:  40 mg


Betamethasone/Clotrimazole (Lotrisone)  0 ml TOP BID [facility]


   Last Admin: 11/17/18 09:03 Dose:  1 applic


Furosemide (Lasix)  20 mg PO DAILY [facility]; Protocol


   Last Admin: 11/17/18 09:03 Dose:  20 mg


Gabapentin (Neurontin)  400 mg PO TID [facility]; Protocol


   Last Admin: 11/17/18 09:03 Dose:  400 mg


Glipizide (Glucotrol)  10 mg PO BID [facility]; Protocol


   Last Admin: 11/17/18 09:03 Dose:  10 mg


Cefepime HCl (Maxipime 1gm)  1 gm in 100 mls @ 100 mls/hr IVPB Q8 [facility]; Protocol


   Last Admin: 11/17/18 05:22 Dose:  100 mls/hr


Vancomycin HCl (Vancomycin 1gm)  1 gm in 250 mls @ 167 mls/hr IVPB 0600,1800 

[facility]; Protocol


   Last Admin: 11/17/18 05:22 Dose:  167 mls/hr


Insulin Human Regular (Humulin R High)  0 units SC ACHS [facility]; Protocol


   Last Admin: 11/17/18 12:12 Dose:  4 unit


Multivitamins/Minerals (Therapeutic-M Tab)  1 tab PO 0800 [facility]


   Last Admin: 11/17/18 08:32 Dose:  1 tab


Nystatin (Nystop Topical Powder)  0 gm TOP BID [facility]


   Last Admin: 11/17/18 09:04 Dose:  1 applic


Ramipril (Altace)  10 mg PO DAILY [facility]; Protocol


   Last Admin: 11/17/18 09:03 Dose:  10 mg











- Constitutional


Appears: No Acute Distress, Chronically Ill





- Head Exam


Head Exam: NORMAL INSPECTION





- Respiratory Exam


Respiratory Exam: Decreased Breath Sounds





- Cardiovascular Exam


Cardiovascular Exam: +S1, +S2





- GI/Abdominal Exam


GI & Abdominal Exam: Soft.  absent: Tenderness





- Extremities Exam


Additional comments: 





both lower extremities with dressings in place





Assessment and Plan





- Assessment and Plan (Free Text)


Plan: 





Assessment


consider right lower extremity infected chronic wounds


history of sepsis due to right leg skin and skin structure infection associated 

with chronic leg ulcers, clinically improved and S/P treatment


history of MRSA and Klebsiella oxytoca right leg cellulitis


history of sepsis secondary to right leg skin/skin structure infection with 

chronic leg ulcers, growing MRSA and sensitive Klebsiella 


history of MRSA cellulitis Sept. 2016


history of Bilateral lower extremity skin and skin structure infection 

(Enterobacter, Klebsiella Oxytoca and Group B Strep, as well as MRSA) which was 

treated 9/2015; MRSA and Pseudomonas cellulitis of left leg in Dec 2015, May and

June 2016


chronic lymphedema of the lower extremities


Morbid obesity with BMI 35


HTN


DM


history of Strep bacteremia


history of hernia surgery


Learning disability





Plan


continue Vancomycin and Cefepime (patient is tolerating Cefepime without rash, 

no respiratory distress, no hives) day 9 for 7-10 days 


surgery doing local wound care


will continue to follow clinically

## 2018-11-18 NOTE — PN
DATE:  11/18/2018



SUBJECTIVE:  The patient is years 58 years old, seen and examined, sitting

in chair, seems to be comfortable, just had bilateral leg dressing done by

Podiatry team.



PHYSICAL EXAMINATION:

VITAL SIGNS:  He is afebrile, pulse 60, respirations 20, blood pressure

157/84.

LUNGS:  Bilateral fair airflow.  No rhonchi or crackle.

HEART:  S1 and S2 audible.

ABDOMEN:  Soft, nontender.  No rebound.  No guarding.  Obese.  No

hepatosplenomegaly.

EXTREMITIES:  Bilateral leg cellulitis and chronic stasis dermatitis.



LABORATORY EXAM:  Blood sugar is 178.



ASSESSMENT:

1.  Morbid obesity.

2.  Cerebral palsy.

3.  Bilateral chronic stasis dermatitis.

4.  Insulin-dependent diabetes.

5.  Hypertension.

6.  Hyperlipidemia.

7.  Diabetic neuropathy.



PLAN:  Currently, the patient is on ramipril 10 mg daily, getting Eliquis

for AFib.  He is on glipizide 10 mg twice a day, he will get Lasix 20 mg

daily.  He is on Lipitor.  He is on gabapentin 400 three times a day and he

is on vancomycin 1 gm every 12 hours.  Dr. Amaro will follow up patient

in a.m.







__________________________________________

Yolanda Conroy MD





DD:  11/18/2018 15:14:59

DT:  11/18/2018 19:01:18

Job # [id number]

## 2018-11-19 RX ADMIN — CLOTRIMAZOLE AND BETAMETHASONE DIPROPIONATE SCH APPLIC: 10; .5 LOTION TOPICAL at 17:13

## 2018-11-19 RX ADMIN — INSULIN HUMAN SCH UNIT: 100 INJECTION, SOLUTION PARENTERAL at 17:12

## 2018-11-19 RX ADMIN — NYSTATIN SCH APPLIC: 100000 POWDER TOPICAL at 10:09

## 2018-11-19 RX ADMIN — INSULIN HUMAN SCH UNIT: 100 INJECTION, SOLUTION PARENTERAL at 12:13

## 2018-11-19 RX ADMIN — CEFEPIME SCH MLS/HR: 1 INJECTION, SOLUTION INTRAVENOUS at 05:09

## 2018-11-19 RX ADMIN — NYSTATIN SCH APPLIC: 100000 POWDER TOPICAL at 17:13

## 2018-11-19 RX ADMIN — MULTIPLE VITAMINS W/ MINERALS TAB SCH TAB: TAB at 08:42

## 2018-11-19 RX ADMIN — INSULIN HUMAN SCH: 100 INJECTION, SOLUTION PARENTERAL at 07:04

## 2018-11-19 RX ADMIN — VANCOMYCIN HYDROCHLORIDE SCH MLS/HR: 1 INJECTION, POWDER, LYOPHILIZED, FOR SOLUTION INTRAVENOUS at 05:09

## 2018-11-19 RX ADMIN — CLOTRIMAZOLE AND BETAMETHASONE DIPROPIONATE SCH: 10; .5 LOTION TOPICAL at 10:09

## 2018-11-19 RX ADMIN — INSULIN HUMAN SCH: 100 INJECTION, SOLUTION PARENTERAL at 22:01

## 2018-11-19 NOTE — CP.PCM.PN
Subjective





- Date & Time of Evaluation


Date of Evaluation: 11/19/18


Time of Evaluation: 10:05





- Subjective


Subjective: 





Comfortable in bed, no fevers.





Objective





- Vital Signs/Intake and Output


Vital Signs (last 24 hours): 


                                        











Temp Pulse Resp BP Pulse Ox


 


 98.2 F   60   20   157/84 H  97 


 


 11/17/18 16:00  11/17/18 16:00  11/17/18 16:00  11/18/18 10:56  11/17/18 16:00











- Medications


Medications: 


                               Current Medications





Apixaban (Eliquis)  2.5 mg PO BID [facility]; Protocol


   Last Admin: 11/18/18 10:56 Dose:  2.5 mg


Atorvastatin Calcium (Lipitor)  40 mg PO HS [facility]; Protocol


   Last Admin: 11/17/18 21:21 Dose:  40 mg


Betamethasone/Clotrimazole (Lotrisone)  0 ml TOP BID [facility]


   Last Admin: 11/18/18 10:58 Dose:  1 applic


Furosemide (Lasix)  20 mg PO DAILY [facility]; Protocol


   Last Admin: 11/18/18 10:56 Dose:  20 mg


Gabapentin (Neurontin)  400 mg PO TID [facility]; Protocol


   Last Admin: 11/18/18 10:59 Dose:  400 mg


Glipizide (Glucotrol)  10 mg PO BID [facility]; Protocol


   Last Admin: 11/18/18 10:56 Dose:  10 mg


Cefepime HCl (Maxipime 1gm)  1 gm in 100 mls @ 100 mls/hr IVPB Q8 [facility]; Protocol


   Last Admin: 11/18/18 06:29 Dose:  100 mls/hr


Vancomycin HCl (Vancomycin 1gm)  1 gm in 250 mls @ 167 mls/hr IVPB 0600,1800 

[facility]; Protocol


   Last Admin: 11/18/18 08:28 Dose:  167 mls/hr


Insulin Human Regular (Humulin R High)  0 units SC ACHS [facility]; Protocol


   Last Admin: 11/18/18 07:08 Dose:  2 unit


Multivitamins/Minerals (Therapeutic-M Tab)  1 tab PO 0800 [facility]


   Last Admin: 11/18/18 09:03 Dose:  1 tab


Nystatin (Nystop Topical Powder)  0 gm TOP BID [facility]


   Last Admin: 11/18/18 10:59 Dose:  1 applic


Ramipril (Altace)  10 mg PO DAILY [facility]; Protocol


   Last Admin: 11/18/18 10:55 Dose:  10 mg











- Constitutional


Appears: Chronically Ill





- Head Exam


Head Exam: NORMAL INSPECTION





- Respiratory Exam


Respiratory Exam: Decreased Breath Sounds





- Cardiovascular Exam


Cardiovascular Exam: +S1, +S2





- GI/Abdominal Exam


GI & Abdominal Exam: Soft.  absent: Tenderness





- Extremities Exam


Additional comments: 





both legs with dressings in place





Assessment and Plan





- Assessment and Plan (Free Text)


Plan: 








Assessment


consider right lower extremity infected chronic wounds


history of sepsis due to right leg skin and skin structure infection associated 

with chronic leg ulcers, clinically improved and S/P treatment


history of MRSA and Klebsiella oxytoca right leg cellulitis


history of sepsis secondary to right leg skin/skin structure infection with 

chronic leg ulcers, growing MRSA and sensitive Klebsiella 


history of MRSA cellulitis Sept. 2016


history of Bilateral lower extremity skin and skin structure infection 

(Enterobacter, Klebsiella Oxytoca and Group B Strep, as well as MRSA) which was 

treated 9/2015; MRSA and Pseudomonas cellulitis of left leg in Dec 2015, May and

June 2016


chronic lymphedema of the lower extremities


Morbid obesity with BMI 35


HTN


DM


history of Strep bacteremia


history of hernia surgery


Learning disability





Plan


will d/c Vancomycin and Cefepime - had 10 days of antibiotics (patient is 

tolerating Cefepime without rash, no respiratory distress, no hives)


surgery doing local wound care


will continue to follow clinically

## 2018-11-19 NOTE — CP.PCM.PN
Subjective





- Date & Time of Evaluation


Date of Evaluation: 11/19/18


Time of Evaluation: 14:30





- Subjective


Subjective: 


Podiatry progress note for Dr. Ramirez





59 y/o male was seen and evaluated at bedside. Patient is resting comfortably in

chair at bedside. Patient denies any acute overnight events. Patient chitra 

F/N/V/SOB/chills








Objective





- Vital Signs/Intake and Output


Vital Signs (last 24 hours): 


                                        











Temp Pulse Resp BP Pulse Ox


 


 98.1 F   80   20   163/87 H  90 L


 


 11/18/18 16:00  11/18/18 16:00  11/18/18 16:00  11/19/18 10:08  11/18/18 16:00











- Medications


Medications: 


                               Current Medications





Apixaban (Eliquis)  2.5 mg PO BID [facility]; Protocol


   Last Admin: 11/19/18 10:08 Dose:  2.5 mg


Atorvastatin Calcium (Lipitor)  40 mg PO HS [facility]; Protocol


   Last Admin: 11/18/18 21:18 Dose:  40 mg


Betamethasone/Clotrimazole (Lotrisone)  0 ml TOP BID [facility]


   Last Admin: 11/19/18 10:09 Dose:  Not Given


Furosemide (Lasix)  20 mg PO DAILY [facility]; Protocol


   Last Admin: 11/19/18 10:08 Dose:  20 mg


Gabapentin (Neurontin)  400 mg PO TID [facility]; Protocol


   Last Admin: 11/19/18 10:09 Dose:  400 mg


Glipizide (Glucotrol)  10 mg PO BID [facility]; Protocol


   Last Admin: 11/19/18 10:08 Dose:  10 mg


Insulin Human Regular (Humulin R High)  0 units SC ACHS [facility]; Protocol


   Last Admin: 11/19/18 12:13 Dose:  2 unit


Multivitamins/Minerals (Therapeutic-M Tab)  1 tab PO 0800 [facility]


   Last Admin: 11/19/18 08:42 Dose:  1 tab


Nystatin (Nystop Topical Powder)  0 gm TOP BID [facility]


   Last Admin: 11/19/18 10:09 Dose:  1 applic


Ramipril (Altace)  10 mg PO DAILY [facility]; Protocol


   Last Admin: 11/19/18 10:08 Dose:  10 mg











- Constitutional


Appears: Well, Non-toxic, No Acute Distress





- Head Exam


Head Exam: ATRAUMATIC, NORMOCEPHALIC





- Extremities Exam


Additional comments: 


Patient Unnaboots intact, clean and dry








- Neurological Exam


Neurological Exam: Alert, Awake, Oriented x3





- Psychiatric Exam


Psychiatric exam: Normal Affect, Normal Mood





Assessment and Plan





- Assessment and Plan (Free Text)


Assessment: 


59 y/o diabetic male with bilateral lower extremity lymphedema,  Left leg 

cellulitis and stasis ulceration





Plan: 


Patient seen and evaluated at bedside


Labs and vitals reviewed- afebrile, WBC 9.9 (11/9)


ID on board: Recommendations appreciated


Patient can shower today, and Unnaboots can be removed


No dressings at this time


Podiatry will put on Unnaboots prior to patient discharge


Continue PT


Podiatry will follow up the patient while in house

## 2018-11-19 NOTE — PN
DATE:  11/19/2018



SUBJECTIVE:  The patient has no complaints of any chest pain.  No shortness

of breath, no headaches or dizziness.



PHYSICAL EXAMINATION:

VITAL SIGNS:  Temperature is 98.1, pulse of 80, blood pressure 142/81,

respirations 20.

GENERAL:  The patient is lying in bed, flat, comfortable.

HEENT:  No oral lesion.  Anicteric sclerae.  Moist mucosa.

NECK:  No JVD, adenopathy, or thyromegaly.

CARDIOVASCULAR:  S1 and S2, regular.  No murmurs, rubs, or gallops.

LUNGS:  Clear to auscultation bilaterally.  No wheeze, rales, or rhonchi.

ABDOMEN:  Bowel sounds are positive, soft, nontender and nondistended.

EXTREMITIES:  No cyanosis, clubbing or edema.

SKIN:  Lower extremities, chronic skin changes.



ASSESSMENT:

1.  Left foot cellulitis, resolved.

2.  Chronic lower extremity lymphedema bilaterally.

3.  Atrial fibrillation, on Eliquis.

4.  Hypertension.

5.  Diabetes type 2.

6.  Dyslipidemia.

7.  Peripheral neuropathy.



PLAN:  The patient is currently comfortable.  He is getting his IV

antibiotics.  He is on glipizide for his diabetes.  He is receiving

ramipril for his hypertension.  He is going to be on atorvastatin for his

dyslipidemia.  The patient is on cefepime for antibiotics and vancomycin. 

He is on a heart-healthy diet.







__________________________________________

Delbert Corona MD



DD:  11/19/2018 9:38:52

DT:  11/19/2018 10:04:02

Job # [id number]

## 2018-11-20 VITALS — RESPIRATION RATE: 18 BRPM

## 2018-11-20 RX ADMIN — INSULIN HUMAN SCH: 100 INJECTION, SOLUTION PARENTERAL at 18:07

## 2018-11-20 RX ADMIN — NYSTATIN SCH APPLIC: 100000 POWDER TOPICAL at 18:08

## 2018-11-20 RX ADMIN — INSULIN HUMAN SCH UNIT: 100 INJECTION, SOLUTION PARENTERAL at 12:01

## 2018-11-20 RX ADMIN — INSULIN HUMAN SCH UNIT: 100 INJECTION, SOLUTION PARENTERAL at 06:55

## 2018-11-20 RX ADMIN — NYSTATIN SCH APPLIC: 100000 POWDER TOPICAL at 09:42

## 2018-11-20 RX ADMIN — CLOTRIMAZOLE AND BETAMETHASONE DIPROPIONATE SCH APPLIC: 10; .5 LOTION TOPICAL at 09:42

## 2018-11-20 RX ADMIN — MULTIPLE VITAMINS W/ MINERALS TAB SCH TAB: TAB at 09:00

## 2018-11-20 RX ADMIN — CLOTRIMAZOLE AND BETAMETHASONE DIPROPIONATE SCH APPLIC: 10; .5 LOTION TOPICAL at 18:07

## 2018-11-20 RX ADMIN — INSULIN HUMAN SCH: 100 INJECTION, SOLUTION PARENTERAL at 21:03

## 2018-11-20 NOTE — PN
DATE:  11/20/2018



SUBJECTIVE:  The patient is seen in bed, in no acute distress, nontoxic.



OBJECTIVE:

VITAL SIGNS:  Temperature is 98, blood pressure is 130/70, and respiratory

rate of 18.

HEENT:  Unremarkable.

NECK:  Supple.

LUNGS:  Have decreased breath sounds.

HEART:  Normal S1 and S2.

ABDOMEN:  Soft.



LABORATORY DATA:  Reviewed.



ASSESSMENT AND PLAN:  This is a 58-year-old with morbid obesity with right

lower extremity infected chronic wound and with hypertension, diabetes,

history of Streptococcus bacteremia, history of hernia surgery _____

patient completed the vancomycin and cefepime, currently off of

antibiotics.  The patient is at risk for developing nosocomial infections. 

We will follow with you.







__________________________________________

Gilbert Villegas MD





DD:  11/20/2018 18:44:52

DT:  11/20/2018 18:48:28

Job # [id number]

## 2018-11-20 NOTE — CP.PCM.PN
<Alee Calvertgissell - Last Filed: 11/20/18 13:06>





Subjective





- Date & Time of Evaluation


Date of Evaluation: 11/20/18


Time of Evaluation: 13:06





- Subjective


Subjective: 


Podiatry progress note for Dr. Hobbs





57 y/o male was seen and evaluated at bedside. Patient is resting comfortably in

chair at bedside. Patient denies any acute overnight events. Patient chitra 

F/N/V/SOB/chills








Objective





- Vital Signs/Intake and Output


Vital Signs (last 24 hours): 


                                        











Temp Pulse Resp BP Pulse Ox


 


 98 F   66   18   139/75   95 


 


 11/19/18 16:00  11/19/18 16:00  11/19/18 16:00  11/20/18 09:41  11/19/18 16:00











- Medications


Medications: 


                               Current Medications





Apixaban (Eliquis)  2.5 mg PO BID [facility]; Protocol


   Last Admin: 11/20/18 09:41 Dose:  2.5 mg


Atorvastatin Calcium (Lipitor)  40 mg PO HS [facility]; Protocol


   Last Admin: 11/19/18 21:13 Dose:  40 mg


Betamethasone/Clotrimazole (Lotrisone)  0 ml TOP BID [facility]


   Last Admin: 11/20/18 09:42 Dose:  1 applic


Furosemide (Lasix)  20 mg PO DAILY [facility]; Protocol


   Last Admin: 11/20/18 09:41 Dose:  20 mg


Gabapentin (Neurontin)  400 mg PO TID [facility]; Protocol


   Last Admin: 11/20/18 09:41 Dose:  400 mg


Glipizide (Glucotrol)  10 mg PO BID [facility]; Protocol


   Last Admin: 11/20/18 09:41 Dose:  10 mg


Insulin Human Regular (Humulin R High)  0 units SC ACHS [facility]; Protocol


   Last Admin: 11/20/18 12:01 Dose:  4 unit


Multivitamins/Minerals (Therapeutic-M Tab)  1 tab PO 0800 [facility]


   Last Admin: 11/20/18 09:00 Dose:  1 tab


Nystatin (Nystop Topical Powder)  0 gm TOP BID [facility]


   Last Admin: 11/20/18 09:42 Dose:  1 applic


Ramipril (Altace)  10 mg PO DAILY [facility]; Protocol


   Last Admin: 11/20/18 09:42 Dose:  10 mg











- Constitutional


Appears: Well, Non-toxic, No Acute Distress





- Head Exam


Head Exam: ATRAUMATIC, NORMOCEPHALIC





- Extremities Exam


Additional comments: 


Vasc: DP 2/4, PT pulse is non palpable 2ry to edema . Temperature gradient warm 

to warm from proximal to distal. Cap refill < 3 sec to all digits. Diffuse non 

pitting pedal edema noted from tibial tuberosity to digits





Neuro: Protective and gross sensations are grossly intact





Derm: Diffuse elephantiasis skin changes with lichnification and wart like skin 

lesions secondary scaling noted to the foot and leg up to the level of the 

tibial tuberosity. Superficial ulceration improved on LLE superior to the medial

malleolus, no purulence, Mild malodor, no fluctuance. No clinical suspicion for 

infection. Slight erythema noted.





MSK: No tenderness to posterior calf. No tenderness to palpation. Muscle power 

intact 5/5 in all groups.








- Neurological Exam


Neurological Exam: Alert, Awake, Oriented x3





- Psychiatric Exam


Psychiatric exam: Normal Affect, Normal Mood





Assessment and Plan





- Assessment and Plan (Free Text)


Assessment: 


57 y/o diabetic male with bilateral lower extremity lymphedema,  Left leg 

cellulitis and stasis ulceration





Plan: 


Patient seen and evaluated at bedside


Discussed in detail with Dr. Hobbs


Labs and vitals reviewed- afebrile, WBC 9.9 (11/9)


ID on board: Recommendations appreciated


No dressing at this time


Podiatry will apply UnnaBoot prior to discharge


Continue PT


Podiatry will follow up the patient while in house








<Manuel Hobbs - Last Filed: 11/20/18 15:13>





Objective





- Vital Signs/Intake and Output


Vital Signs (last 24 hours): 


                                        











Temp Pulse Resp BP Pulse Ox


 


 98 F   66   18   139/75   95 


 


 11/19/18 16:00  11/19/18 16:00  11/19/18 16:00  11/20/18 09:41  11/19/18 16:00











- Medications


Medications: 


                               Current Medications





Apixaban (Eliquis)  2.5 mg PO BID [facility]; Protocol


   Last Admin: 11/20/18 09:41 Dose:  2.5 mg


Atorvastatin Calcium (Lipitor)  40 mg PO HS [facility]; Protocol


   Last Admin: 11/19/18 21:13 Dose:  40 mg


Betamethasone/Clotrimazole (Lotrisone)  0 ml TOP BID [facility]


   Last Admin: 11/20/18 09:42 Dose:  1 applic


Furosemide (Lasix)  20 mg PO DAILY [facility]; Protocol


   Last Admin: 11/20/18 09:41 Dose:  20 mg


Gabapentin (Neurontin)  400 mg PO TID [facility]; Protocol


   Last Admin: 11/20/18 09:41 Dose:  400 mg


Glipizide (Glucotrol)  10 mg PO BID [facility]; Protocol


   Last Admin: 11/20/18 09:41 Dose:  10 mg


Insulin Human Regular (Humulin R High)  0 units SC ACHS [facility]; Protocol


   Last Admin: 11/20/18 12:01 Dose:  4 unit


Multivitamins/Minerals (Therapeutic-M Tab)  1 tab PO 0800 [facility]


   Last Admin: 11/20/18 09:00 Dose:  1 tab


Nystatin (Nystop Topical Powder)  0 gm TOP BID [facility]


   Last Admin: 11/20/18 09:42 Dose:  1 applic


Ramipril (Altace)  10 mg PO DAILY [facility]; Protocol


   Last Admin: 11/20/18 09:42 Dose:  10 mg











Attending/Attestation





- Attestation


I have personally seen and examined this patient.: Yes


I have fully participated in the care of the patient.: Yes


I have reviewed all pertinent clinical information, including history, physical 

exam and plan: Yes

## 2018-11-21 VITALS
OXYGEN SATURATION: 96 % | TEMPERATURE: 98.7 F | SYSTOLIC BLOOD PRESSURE: 166 MMHG | HEART RATE: 62 BPM | DIASTOLIC BLOOD PRESSURE: 84 MMHG

## 2018-11-21 RX ADMIN — MULTIPLE VITAMINS W/ MINERALS TAB SCH TAB: TAB at 09:00

## 2018-11-21 RX ADMIN — CLOTRIMAZOLE AND BETAMETHASONE DIPROPIONATE SCH APPLIC: 10; .5 LOTION TOPICAL at 09:31

## 2018-11-21 RX ADMIN — NYSTATIN SCH APPLIC: 100000 POWDER TOPICAL at 09:31

## 2018-11-21 RX ADMIN — INSULIN HUMAN SCH UNIT: 100 INJECTION, SOLUTION PARENTERAL at 06:51

## 2018-11-21 RX ADMIN — INSULIN HUMAN SCH UNIT: 100 INJECTION, SOLUTION PARENTERAL at 11:43

## 2018-11-21 NOTE — CP.PCM.PN
Subjective





- Date & Time of Evaluation


Date of Evaluation: 11/21/18


Time of Evaluation: 09:06





- Subjective


Subjective: 


Podiatry consult note for Dr. Ramirez





57 y/o male seen and evaluated for bilateral lower extremity edema. Patient 

denies any complaints at this time. Patient was seen resting comfortably in the 

chair at bedside. He denies N/V/F/C/SOB/CP and has no acute pedal complaints 

today. As per nursing, patient will be discharged today from TCU








Objective





- Vital Signs/Intake and Output


Vital Signs (last 24 hours): 


                                        











Temp Pulse Resp BP Pulse Ox


 


 98 F   66   18   139/75   95 


 


 11/19/18 16:00  11/19/18 16:00  11/19/18 16:00  11/20/18 09:41  11/19/18 16:00











- Medications


Medications: 


                               Current Medications





Apixaban (Eliquis)  2.5 mg PO BID [facility]; Protocol


   Last Admin: 11/20/18 18:06 Dose:  2.5 mg


Atorvastatin Calcium (Lipitor)  40 mg PO HS [facility]; Protocol


   Last Admin: 11/20/18 21:03 Dose:  40 mg


Betamethasone/Clotrimazole (Lotrisone)  0 ml TOP BID [facility]


   Last Admin: 11/20/18 18:07 Dose:  1 applic


Furosemide (Lasix)  20 mg PO DAILY [facility]; Protocol


   Last Admin: 11/20/18 09:41 Dose:  20 mg


Gabapentin (Neurontin)  400 mg PO TID [facility]; Protocol


   Last Admin: 11/20/18 18:08 Dose:  400 mg


Glipizide (Glucotrol)  10 mg PO BID [facility]; Protocol


   Last Admin: 11/20/18 18:07 Dose:  10 mg


Insulin Human Regular (Humulin R High)  0 units SC ACHS [facility]; Protocol


   Last Admin: 11/21/18 06:51 Dose:  4 unit


Multivitamins/Minerals (Therapeutic-M Tab)  1 tab PO 0800 [facility]


   Last Admin: 11/20/18 09:00 Dose:  1 tab


Nystatin (Nystop Topical Powder)  0 gm TOP BID [facility]


   Last Admin: 11/20/18 18:08 Dose:  1 applic


Ramipril (Altace)  10 mg PO DAILY [facility]; Protocol


   Last Admin: 11/20/18 09:42 Dose:  10 mg











- Constitutional


Appears: Well, Non-toxic, No Acute Distress





- Head Exam


Head Exam: ATRAUMATIC, NORMOCEPHALIC





- Extremities Exam


Additional comments: 


Vasc: DP 2/4, PT pulse is non palpable 2ry to edema . Temperature gradient warm 

to warm from proximal to distal. Cap refill < 3 sec to all digits. Diffuse non 

pitting pedal edema noted from tibial tuberosity to digits





Neuro: Protective and gross sensations are grossly intact





Derm: Diffuse elephantiasis skin changes with lichnification and wart like skin 

lesions secondary scaling noted to the foot and leg up to the level of the 

tibial tuberosity. Superficial ulceration improved on LLE superior to the medial

malleolus, no purulence, Mild malodor, no fluctuance. No clinical suspicion for 

infection. Slight erythema noted.





MSK: No tenderness to posterior calf. No tenderness to palpation. Muscle power 

intact 5/5 in all groups.








- Neurological Exam


Neurological Exam: Alert, Awake, Oriented x3





- Psychiatric Exam


Psychiatric exam: Normal Affect, Normal Mood





Assessment and Plan





- Assessment and Plan (Free Text)


Assessment: 


57 y/o diabetic male with bilateral lower extremity lymphedema,  Left leg 

cellulitis





Plan: 


Patient seen and evaluated at bedside


Discussed in detail with Dr. Ramirez


Labs and vitals reviewed- afebrile at this time


ID on board: Recommendations appreciated


Lotrisone lotion applied to the LLE.


Nystatin powder applied between folds of legs and behind the left knee


Unna boot applied b/l with coban


Podiatry will follow up the patient while in house


Patient stable for discharge from Podiatry standpoint


[facility] will [facility] in [facility] as needed

## 2018-11-21 NOTE — DS
HISTORY OF PRESENT ILLNESS:  This is a 58-year-old male who was admitted to

the hospital because of lower leg cellulitis.  The patient has a history of

chronic cellulitis.  He was transferred to Transitional Care Unit for

rehab.  Patient has no complaints of any chest pain, shortness of breath,

headaches, or dizziness.



PHYSICAL EXAMINATION:

VITAL SIGNS:  Temperature is 98, pulse 66, blood pressure 140/72,

respirations are 18, O2 saturation is 95%.

GENERAL:  The patient is lying in bed, flat, comfortable.

HEENT:  No oral lesion.  Anicteric sclerae.  Moist mucosa.

NECK:  No JVD, adenopathy, or thyromegaly.

CARDIOVASCULAR:  S1 and S2, regular.  No murmurs, rubs, or gallops.

LUNGS:  Clear to auscultation bilaterally.  No wheeze, rales, or rhonchi.

ABDOMEN:  Bowel sounds are positive, soft, nontender and nondistended.

EXTREMITIES:  Lower extremities, there is chronic skin changes bilaterally.



ASSESSMENT:

1.  Left foot cellulitis, resolved.

2.  Atrial fibrillation, on Eliquis.

3.  Hypertension.

4.  Diabetes type II.

5.  Dyslipidemia.

6.  Peripheral neuropathy.



PLAN:  The patient is on Eliquis for anticoagulation.  Is going to continue

the patient on Glucotrol for diabetes.  He is going to be on Lasix daily. 

He is on Lipitor for dyslipidemia.  The patient is on a heart healthy diet.



CONDITION:  Stable.



ACTIVITIES:  Increase as tolerated.







__________________________________________

Delbert Corona MD





DD:  11/21/2018 9:21:50

DT:  11/21/2018 12:21:01

Job # [id number]

## 2018-11-22 NOTE — PN
DATE:  11/21/2018



SUBJECTIVE:  The patient is in bed, in no acute distress, nontoxic.



OBJECTIVE:

VITALS:  On exam, temperature is 98, blood pressure is 166/80, respiratory

rate 18, heart rate of 62.

HEENT:  Unremarkable.

NECK:  Supple.

LUNGS:  Have decreased breath sounds.

HEART:  Normal S1, S2.

ABDOMEN:  Soft, nontender.



LABORATORY DATA:  Reviewed.



ASSESSMENT AND PLAN:  Reveals the patient is a 58-year-old male, who was

seen earlier this morning in Room 304 with morbid obesity, BMI of 49, large

right lower extremity infected chronic wound, hypertension, diabetes,

history of streptococcus bacteremia, history of hernia surgery, completed

antibiotic therapy with vancomycin and cefepime.  Currently, the patient is

scheduled for discharge, off of antibiotics.  Risk for developing

nosocomial infections.





__________________________________________

Gilbert Villegas MD





DD:  11/21/2018 19:52:28

DT:  11/21/2018 19:55:57

Job # [id number]

## 2021-11-01 NOTE — CP.PCM.PN
<Annelise Spencer - Last Filed: 07/16/17 16:35>





Subjective





- Date & Time of Evaluation


Date of Evaluation: 07/16/17


Time of Evaluation: 13:30





- Subjective


Subjective: 


57 year old male was seen resting in a chair at bedside regarding right lower 

extremity cellulitis, ulcerations and bilateral lower extremity chronic edema. 

Dressing clean dry and intact. Tubigrips noted to LE B/L. Patient NAD. 








Objective





- Vital Signs/Intake and Output


Vital Signs (last 24 hours): 


 











Temp Pulse Resp BP Pulse Ox


 


 98.3 F   67   18   146/73   95 


 


 07/15/17 05:45  07/16/17 09:50  07/15/17 05:45  07/16/17 09:50  07/15/17 05:45











- Medications


Medications: 


 Current Medications





Atorvastatin Calcium (Lipitor)  10 mg PO DIN CYNDEE


   PRN Reason: Protocol


   Last Admin: 07/15/17 17:36 Dose:  10 mg


Diltiazem HCl (Cardizem Cd)  120 mg PO DAILY CYNDEE


   PRN Reason: Protocol


   Last Admin: 07/16/17 09:50 Dose:  120 mg


Furosemide (Lasix)  20 mg PO DAILY CYNDEE


   PRN Reason: Protocol


   Last Admin: 07/16/17 09:50 Dose:  20 mg


Gabapentin (Neurontin)  400 mg PO TID CYNDEE


   PRN Reason: Protocol


   Last Admin: 07/16/17 13:22 Dose:  400 mg


Glipizide (Glucotrol)  10 mg PO 0700,1700 CYNDEE


   PRN Reason: Protocol


   Last Admin: 07/16/17 08:01 Dose:  10 mg


Insulin Human Regular (Humulin R High)  0 units SC ACHS CYNDEE


   PRN Reason: Protocol


   Last Admin: 07/16/17 13:21 Dose:  Not Given


Metformin HCl (Glucophage)  1,000 mg PO ACBD CYNDEE


   PRN Reason: Protocol


   Last Admin: 07/16/17 08:01 Dose:  1,000 mg


Potassium Chloride (K-Dur 20 Meq Er Tab)  20 meq PO 0800 CYNDEE


   PRN Reason: Protocol


   Last Admin: 07/16/17 08:01 Dose:  20 meq


Ramipril (Altace)  10 mg PO DAILY CYNDEE


   PRN Reason: Protocol


   Last Admin: 07/16/17 09:50 Dose:  10 mg











- Labs


Labs: 


 





 07/13/17 05:26 





 











PT  50.5 Seconds (9.9-11.8)  H*  07/16/17  08:19    


 


INR  4.68  (0.93-1.08)  H*  07/16/17  08:19    














- Constitutional


Appears: Well, Non-toxic, No Acute Distress





- Extremities Exam


Additional comments: 


Lower extremity focused exam:





Vasc: Diffuse edema of bilateral lower extremities. Skin temperature is warm to 

warm from proximal to distal b/l with cellulitis shown to be resolving


Derm: Superficial ulceration on RLE shows positive signs of healing- no drainage

, no purulence, no erythema, no malodor noted.  Superfical ulceration-improving 

noted to the lateral aspect just distal to the knee with scant amount of serous 

drainage noted on dressing, no malodor, no fluctuance, mild hyperpigmentation. 

Chronic skin changes of chronic edema noted


Neuro: Protective sensation is grossly intact


Ortho: Negative pain upon palpation to the lower extremities








- Neurological Exam


Neurological Exam: Alert, Awake, Oriented x3





- Psychiatric Exam


Psychiatric exam: Normal Affect, Normal Mood





Assessment and Plan





- Assessment and Plan (Free Text)


Assessment: 


57 year old male with diabetes and chronic lower extremity edema, right LE 

cellulitis and superficial ulcerations on proximal anterior legs B/L





Plan: 


Patient examined and evaluated in bedside chair


Discussed plan in detail with attending Dr. Ramirez


chart, labs, vitals reviewed- afebrile


Left anterior leg wound dressed with Maxorb and Optifoam


Tubigrips applied to LE b/l, to be applied during the day and removed at night


Patient to continue IV abx per ID


Podiatry will continue to follow


Pt will f/u in the wound care center after DC 








<Leah Ramirez - Last Filed: 07/16/17 18:45>





Objective





- Vital Signs/Intake and Output


Vital Signs (last 24 hours): 


 











Temp Pulse Resp BP Pulse Ox


 


 98.3 F   67   18   146/73   95 


 


 07/15/17 05:45  07/16/17 09:50  07/15/17 05:45  07/16/17 09:50  07/15/17 05:45











- Medications


Medications: 


 Current Medications





Atorvastatin Calcium (Lipitor)  10 mg PO DIN CYNDEE


   PRN Reason: Protocol


   Last Admin: 07/16/17 17:21 Dose:  10 mg


Diltiazem HCl (Cardizem Cd)  120 mg PO DAILY CYNDEE


   PRN Reason: Protocol


   Last Admin: 07/16/17 09:50 Dose:  120 mg


Furosemide (Lasix)  20 mg PO DAILY CYNDEE


   PRN Reason: Protocol


   Last Admin: 07/16/17 09:50 Dose:  20 mg


Gabapentin (Neurontin)  400 mg PO TID CYNDEE


   PRN Reason: Protocol


   Last Admin: 07/16/17 17:21 Dose:  400 mg


Glipizide (Glucotrol)  10 mg PO 0700,1700 CYNDEE


   PRN Reason: Protocol


   Last Admin: 07/16/17 17:20 Dose:  10 mg


Insulin Human Regular (Humulin R High)  0 units SC ACHS CYNDEE


   PRN Reason: Protocol


   Last Admin: 07/16/17 17:20 Dose:  2 units


Metformin HCl (Glucophage)  1,000 mg PO ACBD CYNDEE


   PRN Reason: Protocol


   Last Admin: 07/16/17 17:20 Dose:  1,000 mg


Potassium Chloride (K-Dur 20 Meq Er Tab)  20 meq PO 0800 CYNDEE


   PRN Reason: Protocol


   Last Admin: 07/16/17 08:01 Dose:  20 meq


Ramipril (Altace)  10 mg PO DAILY CYNDEE


   PRN Reason: Protocol


   Last Admin: 07/16/17 09:50 Dose:  10 mg











- Labs


Labs: 


 





 07/13/17 05:26 





 











PT  50.5 Seconds (9.9-11.8)  H*  07/16/17  08:19    


 


INR  4.68  (0.93-1.08)  H*  07/16/17  08:19    














Attending/Attestation





- Attestation


I have personally seen and examined this patient.: Yes


I have fully participated in the care of the patient.: Yes


I have reviewed all pertinent clinical information, including history, physical 

exam and plan: Yes
<Danae Iyer - Last Filed: 07/13/17 10:27>





Subjective





- Date & Time of Evaluation


Date of Evaluation: 07/13/17


Time of Evaluation: 10:27





- Subjective


Subjective: 


57 year old male was seen resting in a chair at bedside with attending Dr. Ramirez regarding right lower extremity cellulitis, ulcerations and bilateral 

lower extremity chronic edema. Dressing c/d/i. Tubigrips noted to LE b/l. 

Patient NAD. 





Objective





- Vital Signs/Intake and Output


Vital Signs (last 24 hours): 


 











Temp Pulse Resp BP Pulse Ox


 


 98.5 F   66   18   134/78   95 


 


 07/12/17 16:00  07/13/17 09:10  07/12/17 16:00  07/13/17 09:11  07/12/17 16:00








Intake and Output: 


 











 07/13/17 07/13/17





 06:59 18:59


 


Intake Total 680 


 


Output Total 800 


 


Balance -120 














- Medications


Medications: 


 Current Medications





Atorvastatin Calcium (Lipitor)  10 mg PO DIN CYNDEE


   PRN Reason: Protocol


   Last Admin: 07/12/17 17:35 Dose:  10 mg


Diltiazem HCl (Cardizem Cd)  120 mg PO DAILY CYNDEE


   PRN Reason: Protocol


   Last Admin: 07/13/17 09:10 Dose:  120 mg


Furosemide (Lasix)  20 mg PO DAILY CYNDEE


   PRN Reason: Protocol


   Last Admin: 07/13/17 09:11 Dose:  20 mg


Gabapentin (Neurontin)  400 mg PO TID CYNDEE


   PRN Reason: Protocol


   Last Admin: 07/13/17 09:12 Dose:  400 mg


Glipizide (Glucotrol)  10 mg PO 0700,1700 CYNDEE


   PRN Reason: Protocol


   Last Admin: 07/13/17 08:13 Dose:  10 mg


Vancomycin HCl 1.5 gm/ Sodium (Chloride)  250 mls @ 167 mls/hr IVPB 0600,1800 

CYNDEE


   PRN Reason: Protocol


   Last Admin: 07/13/17 05:35 Dose:  167 mls/hr


Insulin Human Regular (Humulin R High)  0 units SC ACHS CYNDEE


   PRN Reason: Protocol


   Last Admin: 07/13/17 06:38 Dose:  4 units


Metformin HCl (Glucophage)  1,000 mg PO ACBD CYNDEE


   PRN Reason: Protocol


   Last Admin: 07/13/17 08:13 Dose:  1,000 mg


Potassium Chloride (K-Dur 20 Meq Er Tab)  20 meq PO 0800 CYNDEE


   PRN Reason: Protocol


   Last Admin: 07/13/17 08:13 Dose:  20 meq


Ramipril (Altace)  10 mg PO DAILY CYNDEE


   PRN Reason: Protocol


   Last Admin: 07/13/17 09:10 Dose:  10 mg


Warfarin Sodium (Coumadin)  10 mg PO 1800 CYNDEE


   PRN Reason: Protocol


   Last Admin: 07/12/17 17:33 Dose:  10 mg


Warfarin Sodium (Coumadin)  2 mg PO 1800 CYNDEE


   PRN Reason: Protocol


   Last Admin: 07/12/17 17:34 Dose:  2 mg











- Labs


Labs: 


 





 07/13/17 05:26 





 











PT  30.7 Seconds (9.9-11.8)  H*  07/13/17  05:26    


 


INR  2.84  (0.93-1.08)  H  07/13/17  05:26    














- Constitutional


Appears: Well, Non-toxic, No Acute Distress





- Extremities Exam


Additional comments: 


Lower extremity focused exam:





VASC: Diffuse edema of bilateral lower extremities. Skin temperature is warm to 

warm from proximal to distal b/l-cellulitis resolving





NEURO: Gross sensation intact





DERM: Superficial ulceration-improving noted to right lower extremity with 

granular base at the medial aspect of the leg with scant amount sanguinous 

drainage, no purulence, no erythema, no malodor noted.  Superfical ulceration-

improving noted to the lateral aspect just distal to the knee with scant amount 

of serous drainage, no malodor, no fluctuance, mild xerosis and 

hyperpigmentation. Chronic skin changes of chronic edema noted





ORTHO: Negative pain on palpation to the lower extremities





- Neurological Exam


Neurological Exam: Alert, Awake, Oriented x3





- Psychiatric Exam


Psychiatric exam: Normal Affect, Normal Mood





Assessment and Plan





- Assessment and Plan (Free Text)


Assessment: 


57 year old male with diabetes and chronic lower extremity edema, right LE 

cellulitis and ulcerations-improving 


Plan: 


Patient examined and evaluated with attending Dr. Ramirez


chart, labs, vitals reviewed;afebrile


wound culture-MRSA


Right and left leg ulcers dressed with maxorb, optifoam


Tubigrips applied to LE b/l, to be applied during the day and removed at night


Patient to continue IV abx per ID


Podiatry will continue to follow, pt will f/u in the wound care center after DC 





<Leah Ramirez - Last Filed: 07/16/17 17:30>





Objective





- Vital Signs/Intake and Output


Vital Signs (last 24 hours): 


 











Temp Pulse Resp BP Pulse Ox


 


 98.3 F   67   18   146/73   95 


 


 07/15/17 05:45  07/16/17 09:50  07/15/17 05:45  07/16/17 09:50  07/15/17 05:45











- Medications


Medications: 


 Current Medications





Atorvastatin Calcium (Lipitor)  10 mg PO DIN CYNDEE


   PRN Reason: Protocol


   Last Admin: 07/16/17 17:21 Dose:  10 mg


Diltiazem HCl (Cardizem Cd)  120 mg PO DAILY CYNDEE


   PRN Reason: Protocol


   Last Admin: 07/16/17 09:50 Dose:  120 mg


Furosemide (Lasix)  20 mg PO DAILY CYNDEE


   PRN Reason: Protocol


   Last Admin: 07/16/17 09:50 Dose:  20 mg


Gabapentin (Neurontin)  400 mg PO TID CYNDEE


   PRN Reason: Protocol


   Last Admin: 07/16/17 17:21 Dose:  400 mg


Glipizide (Glucotrol)  10 mg PO 0700,1700 CYNDEE


   PRN Reason: Protocol


   Last Admin: 07/16/17 17:20 Dose:  10 mg


Insulin Human Regular (Humulin R High)  0 units SC ACHS CYNDEE


   PRN Reason: Protocol


   Last Admin: 07/16/17 17:20 Dose:  2 units


Metformin HCl (Glucophage)  1,000 mg PO ACBD CYNDEE


   PRN Reason: Protocol


   Last Admin: 07/16/17 17:20 Dose:  1,000 mg


Potassium Chloride (K-Dur 20 Meq Er Tab)  20 meq PO 0800 CYNDEE


   PRN Reason: Protocol


   Last Admin: 07/16/17 08:01 Dose:  20 meq


Ramipril (Altace)  10 mg PO DAILY CYNDEE


   PRN Reason: Protocol


   Last Admin: 07/16/17 09:50 Dose:  10 mg











- Labs


Labs: 


 





 07/13/17 05:26 





 











PT  50.5 Seconds (9.9-11.8)  H*  07/16/17  08:19    


 


INR  4.68  (0.93-1.08)  H*  07/16/17  08:19    














Attending/Attestation





- Attestation


I have personally seen and examined this patient.: Yes


I have fully participated in the care of the patient.: Yes


I have reviewed all pertinent clinical information, including history, physical 

exam and plan: Yes
<IyerDanae - Last Filed: 07/10/17 17:02>





Subjective





- Date & Time of Evaluation


Date of Evaluation: 07/10/17


Time of Evaluation: 17:02





- Subjective


Subjective: 


57 year old male was seen today resting in a chair at bedside in TCU regarding 

right lower extremity cellulitis, ulcerations and bilateral lower extremity 

chronic edema. Bilateral lower extremity bandages are clean dry and intact. No 

new complaints at this time. 





Objective





- Vital Signs/Intake and Output


Vital Signs (last 24 hours): 


 











Temp Pulse Resp BP Pulse Ox


 


 97 F L  75   18   141/68   95 


 


 07/10/17 16:00  07/10/17 16:00  07/10/17 16:00  07/10/17 16:00  07/10/17 16:00








Intake and Output: 


 











 07/10/17 07/10/17





 06:59 18:59


 


Intake Total 500 


 


Output Total 700 


 


Balance -200 














- Medications


Medications: 


 Current Medications





Atorvastatin Calcium (Lipitor)  10 mg PO DIN CYNDEE


   PRN Reason: Protocol


   Last Admin: 07/09/17 17:59 Dose:  10 mg


Diltiazem HCl (Cardizem Cd)  120 mg PO DAILY CYNDEE


   PRN Reason: Protocol


   Last Admin: 07/10/17 10:10 Dose:  120 mg


Furosemide (Lasix)  20 mg PO DAILY CYNDEE


   PRN Reason: Protocol


   Last Admin: 07/10/17 10:11 Dose:  20 mg


Gabapentin (Neurontin)  400 mg PO TID CYNDEE


   PRN Reason: Protocol


   Last Admin: 07/10/17 13:38 Dose:  400 mg


Glipizide (Glucotrol)  10 mg PO 0700,1700 CYNDEE


   PRN Reason: Protocol


   Last Admin: 07/10/17 06:35 Dose:  10 mg


Vancomycin HCl 1.5 gm/ Sodium (Chloride)  250 mls @ 167 mls/hr IVPB 0600,1800 

CYNDEE


   PRN Reason: Protocol


   Last Admin: 07/10/17 05:12 Dose:  167 mls/hr


Insulin Human Regular (Humulin R High)  0 units SC ACHS CYNDEE


   PRN Reason: Protocol


   Last Admin: 07/10/17 12:48 Dose:  2 units


Metformin HCl (Glucophage)  1,000 mg PO ACBD CYNDEE


   PRN Reason: Protocol


Potassium Chloride (K-Dur 20 Meq Er Tab)  20 meq PO 0800 CYNDEE


   PRN Reason: Protocol


   Last Admin: 07/10/17 08:32 Dose:  20 meq


Ramipril (Altace)  10 mg PO DAILY CYNDEE


   PRN Reason: Protocol


   Last Admin: 07/10/17 10:10 Dose:  10 mg


Warfarin Sodium (Coumadin)  10 mg PO 1800 CYNDEE


   PRN Reason: Protocol


   Last Admin: 07/09/17 17:59 Dose:  10 mg


Warfarin Sodium (Coumadin)  2 mg PO 1800 CYNDEE


   PRN Reason: Protocol


   Last Admin: 07/09/17 18:03 Dose:  2 mg











- Labs


Labs: 


 











PT  22.2 Seconds (9.9-11.8)  H  07/10/17  07:40    


 


INR  2.06  (0.93-1.08)  H  07/10/17  07:40    














- Constitutional


Appears: Non-toxic, No Acute Distress





- Extremities Exam


Additional comments: 


Lower extremity focused exam:





VASC: Diffuse edema of bilateral lower extremities. Skin temperature is warm to 

warm from proximal to distal b/l-cellulitis resolving





NEURO: Gross sensation intact





DERM: Superficial ulcerations noted to right lower extremity with granular base 

at the lateral aspect of the leg with scant amount sanguinous drainage, no 

purulence, no erythema, no malodor noted.  Left leg has an open ulcer to the 

lateral aspect just distal to the knee with moderate amount of serous drainage, 

no malodor, no fluctuance, mild xerosis and hyperpigmentation. Chronic skin 

changes of chronic edema noted





ORTHO: Negative pain on palpation to the lower extremities





- Neurological Exam


Neurological Exam: Alert, Awake





- Psychiatric Exam


Psychiatric exam: Normal Affect, Normal Mood





Assessment and Plan





- Assessment and Plan (Free Text)


Assessment: 


57 year old male with diabetes and chronic lower extremity edema, right LE 

celulitis and ulcerations-improving 


Plan: 


Patient examined and evaluated 


discussed in detail with attending Dr. Ramirez


wound culture-MRSA


Patients bilateral legs cleansed with saline


Right leg dressed with maxorb, ABD, kerlix


Left leg dressed with maxorb, optifoam


Patient to continue IV abx per ID


Podiatry will continue to follow, pt will f/u in the wound care center after DC 





<Leah Ramirez - Last Filed: 07/16/17 17:19>





Objective





- Vital Signs/Intake and Output


Vital Signs (last 24 hours): 


 











Temp Pulse Resp BP Pulse Ox


 


 98.3 F   67   18   146/73   95 


 


 07/15/17 05:45  07/16/17 09:50  07/15/17 05:45  07/16/17 09:50  07/15/17 05:45











- Medications


Medications: 


 Current Medications





Atorvastatin Calcium (Lipitor)  10 mg PO DIN CYNDEE


   PRN Reason: Protocol


   Last Admin: 07/15/17 17:36 Dose:  10 mg


Diltiazem HCl (Cardizem Cd)  120 mg PO DAILY CYNDEE


   PRN Reason: Protocol


   Last Admin: 07/16/17 09:50 Dose:  120 mg


Furosemide (Lasix)  20 mg PO DAILY CYNDEE


   PRN Reason: Protocol


   Last Admin: 07/16/17 09:50 Dose:  20 mg


Gabapentin (Neurontin)  400 mg PO TID CYNDEE


   PRN Reason: Protocol


   Last Admin: 07/16/17 13:22 Dose:  400 mg


Glipizide (Glucotrol)  10 mg PO 0700,1700 CYNDEE


   PRN Reason: Protocol


   Last Admin: 07/16/17 08:01 Dose:  10 mg


Insulin Human Regular (Humulin R High)  0 units SC ACHS CYNDEE


   PRN Reason: Protocol


   Last Admin: 07/16/17 13:21 Dose:  Not Given


Metformin HCl (Glucophage)  1,000 mg PO ACBD CYNDEE


   PRN Reason: Protocol


   Last Admin: 07/16/17 08:01 Dose:  1,000 mg


Potassium Chloride (K-Dur 20 Meq Er Tab)  20 meq PO 0800 CYNDEE


   PRN Reason: Protocol


   Last Admin: 07/16/17 08:01 Dose:  20 meq


Ramipril (Altace)  10 mg PO DAILY CYNDEE


   PRN Reason: Protocol


   Last Admin: 07/16/17 09:50 Dose:  10 mg











- Labs


Labs: 


 





 07/13/17 05:26 





 











PT  50.5 Seconds (9.9-11.8)  H*  07/16/17  08:19    


 


INR  4.68  (0.93-1.08)  H*  07/16/17  08:19    














Attending/Attestation





- Attestation


I have personally seen and examined this patient.: Yes


I have fully participated in the care of the patient.: Yes


I have reviewed all pertinent clinical information, including history, physical 

exam and plan: Yes
<IyerDanae - Last Filed: 07/12/17 11:32>





Subjective





- Date & Time of Evaluation


Date of Evaluation: 07/12/17


Time of Evaluation: 11:32





- Subjective


Subjective: 


57 year old male was seen today resting in a chair at bedside with attending 

Dr. Ramirez regarding right lower extremity cellulitis, ulcerations and 

bilateral lower extremity chronic edema. Dressing c/d/i. Patient NAD. 





Objective





- Vital Signs/Intake and Output


Vital Signs (last 24 hours): 


 











Temp Pulse Resp BP Pulse Ox


 


 98.8 F   70   18   124/63   100 


 


 07/11/17 16:44  07/12/17 09:48  07/11/17 16:44  07/12/17 09:49  07/11/17 16:44











- Medications


Medications: 


 Current Medications





Atorvastatin Calcium (Lipitor)  10 mg PO DIN CYNDEE


   PRN Reason: Protocol


   Last Admin: 07/11/17 17:32 Dose:  10 mg


Diltiazem HCl (Cardizem Cd)  120 mg PO DAILY CYNDEE


   PRN Reason: Protocol


   Last Admin: 07/12/17 09:48 Dose:  120 mg


Furosemide (Lasix)  20 mg PO DAILY CYNDEE


   PRN Reason: Protocol


   Last Admin: 07/12/17 09:49 Dose:  20 mg


Gabapentin (Neurontin)  400 mg PO TID CYNDEE


   PRN Reason: Protocol


   Last Admin: 07/12/17 09:50 Dose:  400 mg


Glipizide (Glucotrol)  10 mg PO 0700,1700 CYNDEE


   PRN Reason: Protocol


   Last Admin: 07/12/17 07:52 Dose:  10 mg


Vancomycin HCl 1.5 gm/ Sodium (Chloride)  250 mls @ 167 mls/hr IVPB 0600,1800 

CYNDEE


   PRN Reason: Protocol


   Last Admin: 07/12/17 05:38 Dose:  167 mls/hr


Insulin Human Regular (Humulin R High)  0 units SC ACHS CYNDEE


   PRN Reason: Protocol


   Last Admin: 07/12/17 06:35 Dose:  2 units


Metformin HCl (Glucophage)  1,000 mg PO ACBD CYNDEE


   PRN Reason: Protocol


   Last Admin: 07/12/17 07:52 Dose:  1,000 mg


Potassium Chloride (K-Dur 20 Meq Er Tab)  20 meq PO 0800 CYNDEE


   PRN Reason: Protocol


   Last Admin: 07/12/17 07:53 Dose:  20 meq


Ramipril (Altace)  10 mg PO DAILY CYNDEE


   PRN Reason: Protocol


   Last Admin: 07/12/17 09:48 Dose:  10 mg


Warfarin Sodium (Coumadin)  10 mg PO 1800 CYNDEE


   PRN Reason: Protocol


   Last Admin: 07/11/17 18:30 Dose:  10 mg


Warfarin Sodium (Coumadin)  2 mg PO 1800 CYNDEE


   PRN Reason: Protocol


   Last Admin: 07/11/17 18:29 Dose:  2 mg











- Labs


Labs: 


 











PT  22.2 Seconds (9.9-11.8)  H  07/10/17  07:40    


 


INR  2.06  (0.93-1.08)  H  07/10/17  07:40    














- Constitutional


Appears: Well, Non-toxic, No Acute Distress





- Extremities Exam


Additional comments: 


Lower extremity focused exam:





VASC: Diffuse edema of bilateral lower extremities. Skin temperature is warm to 

warm from proximal to distal b/l-cellulitis resolving





NEURO: Gross sensation intact





DERM: Superficial ulceration noted to right lower extremity with granular base 

at the medial aspect of the leg with scant amount sanguinous drainage, no 

purulence, no erythema, no malodor noted.  Superfical ulceration noted to the 

lateral aspect just distal to the knee with scant amount of serous drainage, no 

malodor, no fluctuance, mild xerosis and hyperpigmentation. Chronic skin 

changes of chronic edema noted





ORTHO: Negative pain on palpation to the lower extremities





- Neurological Exam


Neurological Exam: Alert, Awake





- Psychiatric Exam


Psychiatric exam: Normal Affect, Normal Mood





Assessment and Plan





- Assessment and Plan (Free Text)


Assessment: 


57 year old male with diabetes and chronic lower extremity edema, right LE 

cellulitis and ulcerations-improving 


Plan: 


Patient examined and evaluated with attending Dr. Ramirez


chart,labs, vitals reviewed


wound culture-MRSA


Right and left leg ulcers dressed with maxorb, optifoam


Tubigrips applied to LE b/l, to be applied during the day and removed at night


Patient to continue IV abx per ID


Podiatry will continue to follow, pt will f/u in the wound care center after DC 





<Leah Ramirez - Last Filed: 07/16/17 17:22>





Objective





- Vital Signs/Intake and Output


Vital Signs (last 24 hours): 


 











Temp Pulse Resp BP Pulse Ox


 


 98.3 F   67   18   146/73   95 


 


 07/15/17 05:45  07/16/17 09:50  07/15/17 05:45  07/16/17 09:50  07/15/17 05:45











- Medications


Medications: 


 Current Medications





Atorvastatin Calcium (Lipitor)  10 mg PO DIN CYNDEE


   PRN Reason: Protocol


   Last Admin: 07/15/17 17:36 Dose:  10 mg


Diltiazem HCl (Cardizem Cd)  120 mg PO DAILY CYNDEE


   PRN Reason: Protocol


   Last Admin: 07/16/17 09:50 Dose:  120 mg


Furosemide (Lasix)  20 mg PO DAILY CYNDEE


   PRN Reason: Protocol


   Last Admin: 07/16/17 09:50 Dose:  20 mg


Gabapentin (Neurontin)  400 mg PO TID CYNDEE


   PRN Reason: Protocol


   Last Admin: 07/16/17 13:22 Dose:  400 mg


Glipizide (Glucotrol)  10 mg PO 0700,1700 CYNDEE


   PRN Reason: Protocol


   Last Admin: 07/16/17 08:01 Dose:  10 mg


Insulin Human Regular (Humulin R High)  0 units SC ACHS CYNDEE


   PRN Reason: Protocol


   Last Admin: 07/16/17 13:21 Dose:  Not Given


Metformin HCl (Glucophage)  1,000 mg PO ACBD CYNDEE


   PRN Reason: Protocol


   Last Admin: 07/16/17 08:01 Dose:  1,000 mg


Potassium Chloride (K-Dur 20 Meq Er Tab)  20 meq PO 0800 CYNDEE


   PRN Reason: Protocol


   Last Admin: 07/16/17 08:01 Dose:  20 meq


Ramipril (Altace)  10 mg PO DAILY CYNDEE


   PRN Reason: Protocol


   Last Admin: 07/16/17 09:50 Dose:  10 mg











- Labs


Labs: 


 





 07/13/17 05:26 





 











PT  50.5 Seconds (9.9-11.8)  H*  07/16/17  08:19    


 


INR  4.68  (0.93-1.08)  H*  07/16/17  08:19    














Attending/Attestation





- Attestation


I have personally seen and examined this patient.: Yes


I have fully participated in the care of the patient.: Yes


I have reviewed all pertinent clinical information, including history, physical 

exam and plan: Yes
<JaylonDanae - Last Filed: 07/11/17 12:12>





Subjective





- Date & Time of Evaluation


Date of Evaluation: 07/11/17


Time of Evaluation: 12:12





- Subjective


Subjective: 


57 year old male was seen today resting in a chair at bedside in TCU regarding 

right lower extremity cellulitis, ulcerations and bilateral lower extremity 

chronic edema. Dressing c/d/i. Patient NAD. 





Objective





- Vital Signs/Intake and Output


Vital Signs (last 24 hours): 


 











Temp Pulse Resp BP Pulse Ox


 


 97.6 F   72   12   135/82   100 


 


 07/11/17 10:00  07/11/17 10:14  07/11/17 10:00  07/11/17 10:15  07/11/17 10:00











- Medications


Medications: 


 Current Medications





Atorvastatin Calcium (Lipitor)  10 mg PO DIN CYNDEE


   PRN Reason: Protocol


   Last Admin: 07/10/17 17:44 Dose:  10 mg


Diltiazem HCl (Cardizem Cd)  120 mg PO DAILY CYNDEE


   PRN Reason: Protocol


   Last Admin: 07/11/17 10:14 Dose:  120 mg


Furosemide (Lasix)  20 mg PO DAILY CYNDEE


   PRN Reason: Protocol


   Last Admin: 07/11/17 10:15 Dose:  20 mg


Gabapentin (Neurontin)  400 mg PO TID CYNDEE


   PRN Reason: Protocol


   Last Admin: 07/11/17 10:15 Dose:  400 mg


Glipizide (Glucotrol)  10 mg PO 0700,1700 CYNDEE


   PRN Reason: Protocol


   Last Admin: 07/11/17 07:52 Dose:  10 mg


Vancomycin HCl 1.5 gm/ Sodium (Chloride)  250 mls @ 167 mls/hr IVPB 0600,1800 

CYNDEE


   PRN Reason: Protocol


   Last Admin: 07/11/17 05:22 Dose:  167 mls/hr


Insulin Human Regular (Humulin R High)  0 units SC ACHS CYNDEE


   PRN Reason: Protocol


   Last Admin: 07/11/17 11:55 Dose:  Not Given


Metformin HCl (Glucophage)  1,000 mg PO ACBD CYNDEE


   PRN Reason: Protocol


   Last Admin: 07/11/17 07:52 Dose:  1,000 mg


Potassium Chloride (K-Dur 20 Meq Er Tab)  20 meq PO 0800 CYNDEE


   PRN Reason: Protocol


   Last Admin: 07/11/17 07:53 Dose:  20 meq


Ramipril (Altace)  10 mg PO DAILY CYNDEE


   PRN Reason: Protocol


   Last Admin: 07/11/17 10:14 Dose:  10 mg


Warfarin Sodium (Coumadin)  10 mg PO 1800 CYNDEE


   PRN Reason: Protocol


   Last Admin: 07/10/17 17:42 Dose:  10 mg


Warfarin Sodium (Coumadin)  2 mg PO 1800 CYNDEE


   PRN Reason: Protocol


   Last Admin: 07/10/17 17:42 Dose:  2 mg











- Labs


Labs: 


 











PT  22.2 Seconds (9.9-11.8)  H  07/10/17  07:40    


 


INR  2.06  (0.93-1.08)  H  07/10/17  07:40    














- Constitutional


Appears: No Acute Distress





- Extremities Exam


Additional comments: 


Lower extremity focused exam:





VASC: Diffuse edema of bilateral lower extremities. Skin temperature is warm to 

warm from proximal to distal b/l-cellulitis resolving





NEURO: Gross sensation intact





DERM: Superficial ulcerations noted to right lower extremity with granular base 

at the lateral aspect of the leg with scant amount sanguinous drainage, no 

purulence, no erythema, no malodor noted.  Left leg has an open ulcer to the 

lateral aspect just distal to the knee with mild amount of serous drainage, no 

malodor, no fluctuance, mild xerosis and hyperpigmentation. Chronic skin 

changes of chronic edema noted





ORTHO: Negative pain on palpation to the lower extremities





- Neurological Exam


Neurological Exam: Alert, Awake





Assessment and Plan





- Assessment and Plan (Free Text)


Assessment: 


57 year old male with diabetes and chronic lower extremity edema, right LE 

celulitis and ulcerations-improving 


Plan: 


Patient examined and evaluated 


discussed in detail with attending Dr. Ramirez


chart,labs, vitals reviewed


wound culture-MRSA


Right leg dressed with maxorb, ABD, kerlix


Left leg dressed with maxorb, optifoam


Patient to continue IV abx per ID


Podiatry will continue to follow, pt will f/u in the wound care center after DC 





<Leah Ramirez - Last Filed: 07/16/17 17:21>





Objective





- Vital Signs/Intake and Output


Vital Signs (last 24 hours): 


 











Temp Pulse Resp BP Pulse Ox


 


 98.3 F   67   18   146/73   95 


 


 07/15/17 05:45  07/16/17 09:50  07/15/17 05:45  07/16/17 09:50  07/15/17 05:45











- Medications


Medications: 


 Current Medications





Atorvastatin Calcium (Lipitor)  10 mg PO DIN CYNDEE


   PRN Reason: Protocol


   Last Admin: 07/15/17 17:36 Dose:  10 mg


Diltiazem HCl (Cardizem Cd)  120 mg PO DAILY CYNDEE


   PRN Reason: Protocol


   Last Admin: 07/16/17 09:50 Dose:  120 mg


Furosemide (Lasix)  20 mg PO DAILY CYNDEE


   PRN Reason: Protocol


   Last Admin: 07/16/17 09:50 Dose:  20 mg


Gabapentin (Neurontin)  400 mg PO TID CYNDEE


   PRN Reason: Protocol


   Last Admin: 07/16/17 13:22 Dose:  400 mg


Glipizide (Glucotrol)  10 mg PO 0700,1700 CYNDEE


   PRN Reason: Protocol


   Last Admin: 07/16/17 08:01 Dose:  10 mg


Insulin Human Regular (Humulin R High)  0 units SC ACHS CYNDEE


   PRN Reason: Protocol


   Last Admin: 07/16/17 13:21 Dose:  Not Given


Metformin HCl (Glucophage)  1,000 mg PO ACBD CYNDEE


   PRN Reason: Protocol


   Last Admin: 07/16/17 08:01 Dose:  1,000 mg


Potassium Chloride (K-Dur 20 Meq Er Tab)  20 meq PO 0800 CYNDEE


   PRN Reason: Protocol


   Last Admin: 07/16/17 08:01 Dose:  20 meq


Ramipril (Altace)  10 mg PO DAILY CYNDEE


   PRN Reason: Protocol


   Last Admin: 07/16/17 09:50 Dose:  10 mg











- Labs


Labs: 


 





 07/13/17 05:26 





 











PT  50.5 Seconds (9.9-11.8)  H*  07/16/17  08:19    


 


INR  4.68  (0.93-1.08)  H*  07/16/17  08:19    














Attending/Attestation





- Attestation


I have personally seen and examined this patient.: Yes


I have fully participated in the care of the patient.: Yes


I have reviewed all pertinent clinical information, including history, physical 

exam and plan: Yes
Subjective





- Date & Time of Evaluation


Date of Evaluation: 07/09/17


Time of Evaluation: 08:25





- Subjective


Subjective: 





PGY 1 for Dr. Marshall





Patient seen and examined this morning. No acute events over night. Patient has 

MR and does not verbalize any complaints at this time.





Objective





- Vital Signs/Intake and Output


Vital Signs (last 24 hours): 


 











Temp Pulse Resp BP Pulse Ox


 


 98.5 F   78   18   162/90 H  95 


 


 07/09/17 06:00  07/09/17 06:00  07/09/17 06:00  07/09/17 06:00  07/09/17 06:00








Intake and Output: 


 











 07/09/17 07/09/17





 06:59 18:59


 


Intake Total 680 


 


Output Total 700 


 


Balance -20 














- Medications


Medications: 


 Current Medications





Atorvastatin Calcium (Lipitor)  10 mg PO DIN CYNDEE


   PRN Reason: Protocol


   Last Admin: 07/08/17 17:27 Dose:  10 mg


Diltiazem HCl (Cardizem Cd)  120 mg PO DAILY CYNDEE


   PRN Reason: Protocol


Furosemide (Lasix)  20 mg PO DAILY YCNDEE


   PRN Reason: Protocol


Gabapentin (Neurontin)  400 mg PO TID CYNDEE


   PRN Reason: Protocol


   Last Admin: 07/08/17 17:27 Dose:  400 mg


Glipizide (Glucotrol)  10 mg PO 0700,1700 CYNDEE


   PRN Reason: Protocol


   Last Admin: 07/09/17 06:43 Dose:  10 mg


Cefepime HCl (Maxipime 1gm)  1 gm in 100 mls @ 100 mls/hr IVPB Q8 CYNDEE


   PRN Reason: Protocol


   Last Admin: 07/09/17 05:18 Dose:  100 mls/hr


Vancomycin HCl (Vancomycin 1gm)  1 gm in 250 mls @ 167 mls/hr IVPB Q12H CYNDEE


   PRN Reason: Protocol


   Last Admin: 07/09/17 05:18 Dose:  167 mls/hr


Insulin Human Regular (Humulin R High)  0 units SC ACHS CYNDEE


   PRN Reason: Protocol


   Last Admin: 07/09/17 06:33 Dose:  4 units


Metformin HCl (Glucophage)  1,000 mg PO ACBD CYNDEE


   PRN Reason: Protocol


   Last Admin: 07/09/17 08:03 Dose:  1,000 mg


Potassium Chloride (K-Dur 20 Meq Er Tab)  20 meq PO 0800 CYNDEE


   PRN Reason: Protocol


   Last Admin: 07/09/17 08:03 Dose:  20 meq


Ramipril (Altace)  10 mg PO DAILY CYNDEE


   PRN Reason: Protocol


Warfarin Sodium (Coumadin)  10 mg PO 1800 CYNDEE


   PRN Reason: Protocol


Warfarin Sodium (Coumadin)  2 mg PO 1800 CYNDEE


   PRN Reason: Protocol











- Constitutional


Appears: No Acute Distress





- Respiratory Exam


Respiratory Exam: NORMAL BREATHING PATTERN.  absent: Respiratory Distress





- Extremities Exam


Additional comments: 





b/l LE lymphedema  





- Neurological Exam


Neurological Exam: Alert, Awake





Assessment and Plan





- Assessment and Plan (Free Text)


Assessment: 





57 year old M with b/l LE lymphedema and RLE wound


Plan: 





Apply abdominal binders to upper thighs b/l 


Apply ace wraps to lower portion of LE b/l


Continue medical management for MRSA infxn





Matt Nunez PGY 1
Subjective





- Date & Time of Evaluation


Date of Evaluation: 07/10/17


Time of Evaluation: 08:04





- Subjective


Subjective: 


General Surgery


PGY 1 for Dr. Marshall





Patient seen and examined this morning at bedside. No acute events over night. 

Patient has MR and does not verbalize any discomfort at this time.





Objective





- Vital Signs/Intake and Output


Vital Signs (last 24 hours): 


 











Temp Pulse Resp BP Pulse Ox


 


 98.5 F   71   18   155/84 H  97 


 


 07/10/17 05:29  07/10/17 05:29  07/10/17 05:29  07/10/17 05:29  07/10/17 05:29








Intake and Output: 


 











 07/10/17 07/10/17





 06:59 18:59


 


Intake Total 500 


 


Output Total 700 


 


Balance -200 














- Medications


Medications: 


 Current Medications





Atorvastatin Calcium (Lipitor)  10 mg PO DIN CYNDEE


   PRN Reason: Protocol


   Last Admin: 07/09/17 17:59 Dose:  10 mg


Diltiazem HCl (Cardizem Cd)  120 mg PO DAILY CYNDEE


   PRN Reason: Protocol


   Last Admin: 07/09/17 10:21 Dose:  120 mg


Furosemide (Lasix)  20 mg PO DAILY CYNDEE


   PRN Reason: Protocol


   Last Admin: 07/09/17 10:23 Dose:  20 mg


Gabapentin (Neurontin)  400 mg PO TID CYNDEE


   PRN Reason: Protocol


   Last Admin: 07/09/17 17:58 Dose:  400 mg


Glipizide (Glucotrol)  10 mg PO 0700,1700 CYNDEE


   PRN Reason: Protocol


   Last Admin: 07/10/17 06:35 Dose:  10 mg


Vancomycin HCl 1.5 gm/ Sodium (Chloride)  250 mls @ 167 mls/hr IVPB 0600,1800 

CYNDEE


   PRN Reason: Protocol


   Last Admin: 07/10/17 05:12 Dose:  167 mls/hr


Insulin Human Regular (Humulin R High)  0 units SC ACHS CYNDEE


   PRN Reason: Protocol


   Last Admin: 07/10/17 06:36 Dose:  2 units


Metformin HCl (Glucophage)  1,000 mg PO ACBD CYNDEE


   PRN Reason: Protocol


   Last Admin: 07/09/17 17:58 Dose:  1,000 mg


Potassium Chloride (K-Dur 20 Meq Er Tab)  20 meq PO 0800 CYNDEE


   PRN Reason: Protocol


   Last Admin: 07/09/17 08:03 Dose:  20 meq


Ramipril (Altace)  10 mg PO DAILY CYNDEE


   PRN Reason: Protocol


   Last Admin: 07/09/17 10:21 Dose:  10 mg


Warfarin Sodium (Coumadin)  10 mg PO 1800 CYNDEE


   PRN Reason: Protocol


   Last Admin: 07/09/17 17:59 Dose:  10 mg


Warfarin Sodium (Coumadin)  2 mg PO 1800 CYNDEE


   PRN Reason: Protocol


   Last Admin: 07/09/17 18:03 Dose:  2 mg











- Constitutional


Appears: No Acute Distress





- Head Exam


Head Exam: ATRAUMATIC





- ENT Exam


ENT Exam: Mucous Membranes Moist





- Respiratory Exam


Respiratory Exam: Clear to Ausculation Bilateral, NORMAL BREATHING PATTERN





- Cardiovascular Exam


Cardiovascular Exam: REGULAR RHYTHM





- Extremities Exam


Extremities Exam: Pedal Edema


Additional comments: 





B/L LE edema with RLE wound.





- Neurological Exam


Neurological Exam: Alert, Awake





- Psychiatric Exam


Additional comments: 





Patient has MR. He is in a pleasant mood today.   





- Skin


Skin Exam: Dry, Normal Color, Warm





Assessment and Plan





- Assessment and Plan (Free Text)


Assessment: 





57 year old M with B/L LE edema with RLE wound.


Plan: 





No need for abdominal binders/ace wraps on LE at this time.


Continue medical management for MRSA infxn.


No need for surgical intervention at this moment.





Matt Nunez PGY 1
Subjective





- Date & Time of Evaluation


Date of Evaluation: 07/10/17


Time of Evaluation: 12:30





- Subjective


Subjective: 





Comfortably walking around doing physical rehab, not in distress, afebrile.





Objective





- Vital Signs/Intake and Output


Vital Signs (last 24 hours): 


 











Temp Pulse Resp BP Pulse Ox


 


 97 F L  75   18   141/68   95 


 


 07/10/17 16:00  07/10/17 16:00  07/10/17 16:00  07/10/17 16:00  07/10/17 16:00








Intake and Output: 


 











 07/10/17 07/10/17





 06:59 18:59


 


Intake Total 500 


 


Output Total 700 


 


Balance -200 














- Medications


Medications: 


 Current Medications





Atorvastatin Calcium (Lipitor)  10 mg PO DIN CYNDEE


   PRN Reason: Protocol


   Last Admin: 07/10/17 17:44 Dose:  10 mg


Diltiazem HCl (Cardizem Cd)  120 mg PO DAILY CYNDEE


   PRN Reason: Protocol


   Last Admin: 07/10/17 10:10 Dose:  120 mg


Furosemide (Lasix)  20 mg PO DAILY CYNDEE


   PRN Reason: Protocol


   Last Admin: 07/10/17 10:11 Dose:  20 mg


Gabapentin (Neurontin)  400 mg PO TID CYNDEE


   PRN Reason: Protocol


   Last Admin: 07/10/17 17:44 Dose:  400 mg


Glipizide (Glucotrol)  10 mg PO 0700,1700 CYNDEE


   PRN Reason: Protocol


   Last Admin: 07/10/17 17:43 Dose:  10 mg


Vancomycin HCl 1.5 gm/ Sodium (Chloride)  250 mls @ 167 mls/hr IVPB 0600,1800 

CYNDEE


   PRN Reason: Protocol


   Last Admin: 07/10/17 17:45 Dose:  167 mls/hr


Insulin Human Regular (Humulin R High)  0 units SC ACHS CYNDEE


   PRN Reason: Protocol


   Last Admin: 07/10/17 17:43 Dose:  2 units


Metformin HCl (Glucophage)  1,000 mg PO ACBD CYNDEE


   PRN Reason: Protocol


   Last Admin: 07/10/17 17:43 Dose:  1,000 mg


Potassium Chloride (K-Dur 20 Meq Er Tab)  20 meq PO 0800 CYNDEE


   PRN Reason: Protocol


   Last Admin: 07/10/17 08:32 Dose:  20 meq


Ramipril (Altace)  10 mg PO DAILY CYNDEE


   PRN Reason: Protocol


   Last Admin: 07/10/17 10:10 Dose:  10 mg


Warfarin Sodium (Coumadin)  10 mg PO 1800 CYNDEE


   PRN Reason: Protocol


   Last Admin: 07/10/17 17:42 Dose:  10 mg


Warfarin Sodium (Coumadin)  2 mg PO 1800 CYNDEE


   PRN Reason: Protocol


   Last Admin: 07/10/17 17:42 Dose:  2 mg











- Labs


Labs: 


 











PT  22.2 Seconds (9.9-11.8)  H  07/10/17  07:40    


 


INR  2.06  (0.93-1.08)  H  07/10/17  07:40    














- Constitutional


Appears: Non-toxic, No Acute Distress





- Head Exam


Head Exam: NORMAL INSPECTION





- ENT Exam


ENT Exam: Mucous Membranes Moist





- Neck Exam


Neck Exam: absent: Meningismus





- Respiratory Exam


Respiratory Exam: Decreased Breath Sounds





- Cardiovascular Exam


Cardiovascular Exam: +S1, +S2





- GI/Abdominal Exam


GI & Abdominal Exam: Soft.  absent: Tenderness





Assessment and Plan





- Assessment and Plan (Free Text)


Plan: 





Assessment


Probable right leg skin and skin structure infection associated with chronic 

leg ulcers, growing MRSA


history of sepsis secondary to right leg skin/skin structure infection with 

chronic leg ulcers, growing MRSA and sensitive Klebsiella 


history of MRSA cellulitis Sept. 2016


history of Bilateral lower extremity skin and skin structure infection (

Enterobacter, Klebsiella Oxytoca and Group B Strep, as well as MRSA) which was 

treated 9/2015; MRSA and Pseudomonas cellulitis of left leg in Dec 2015, May 

and June 2016


chronic lymphedema of the lower extremities


Morbid obesity with BMI 55


HTN


DM


history of Strep bacteremia


history of hernia surgery


Learning disability





Plan


continue Vancomycin day 5 and will continue to follow clinically - will target 7

-10 days of therapy
Subjective





- Date & Time of Evaluation


Date of Evaluation: 07/12/17


Time of Evaluation: 12:05





- Subjective


Subjective: 





Comfortable on a chair, no fevers overnight, not in distress.





Objective





- Vital Signs/Intake and Output


Vital Signs (last 24 hours): 


 











Temp Pulse Resp BP Pulse Ox


 


 97 F L  75   18   141/68   95 


 


 07/10/17 16:00  07/10/17 16:00  07/10/17 16:00  07/10/17 16:00  07/10/17 16:00











- Medications


Medications: 


 Current Medications





Atorvastatin Calcium (Lipitor)  10 mg PO DIN CYNDEE


   PRN Reason: Protocol


   Last Admin: 07/10/17 17:44 Dose:  10 mg


Diltiazem HCl (Cardizem Cd)  120 mg PO DAILY CYNDEE


   PRN Reason: Protocol


   Last Admin: 07/10/17 10:10 Dose:  120 mg


Furosemide (Lasix)  20 mg PO DAILY CYNDEE


   PRN Reason: Protocol


   Last Admin: 07/10/17 10:11 Dose:  20 mg


Gabapentin (Neurontin)  400 mg PO TID CYNDEE


   PRN Reason: Protocol


   Last Admin: 07/10/17 17:44 Dose:  400 mg


Glipizide (Glucotrol)  10 mg PO 0700,1700 CYNDEE


   PRN Reason: Protocol


   Last Admin: 07/11/17 07:52 Dose:  10 mg


Vancomycin HCl 1.5 gm/ Sodium (Chloride)  250 mls @ 167 mls/hr IVPB 0600,1800 

CYNDEE


   PRN Reason: Protocol


   Last Admin: 07/11/17 05:22 Dose:  167 mls/hr


Insulin Human Regular (Humulin R High)  0 units SC ACHS CYNDEE


   PRN Reason: Protocol


   Last Admin: 07/11/17 06:52 Dose:  4 units


Metformin HCl (Glucophage)  1,000 mg PO ACBD CYNDEE


   PRN Reason: Protocol


   Last Admin: 07/11/17 07:52 Dose:  1,000 mg


Potassium Chloride (K-Dur 20 Meq Er Tab)  20 meq PO 0800 CYNDEE


   PRN Reason: Protocol


   Last Admin: 07/11/17 07:53 Dose:  20 meq


Ramipril (Altace)  10 mg PO DAILY CYNDEE


   PRN Reason: Protocol


   Last Admin: 07/10/17 10:10 Dose:  10 mg


Warfarin Sodium (Coumadin)  10 mg PO 1800 CYNDEE


   PRN Reason: Protocol


   Last Admin: 07/10/17 17:42 Dose:  10 mg


Warfarin Sodium (Coumadin)  2 mg PO 1800 CYNDEE


   PRN Reason: Protocol


   Last Admin: 07/10/17 17:42 Dose:  2 mg











- Labs


Labs: 


 











PT  22.2 Seconds (9.9-11.8)  H  07/10/17  07:40    


 


INR  2.06  (0.93-1.08)  H  07/10/17  07:40    














- Constitutional


Appears: Non-toxic, No Acute Distress





- Head Exam


Head Exam: NORMAL INSPECTION





- Neck Exam


Neck Exam: absent: Meningismus





- Respiratory Exam


Respiratory Exam: Decreased Breath Sounds





- Cardiovascular Exam


Cardiovascular Exam: +S1, +S2





- GI/Abdominal Exam


GI & Abdominal Exam: Soft.  absent: Tenderness





- Extremities Exam


Additional comments: 





both lower extremities with dry dressings in place





Assessment and Plan





- Assessment and Plan (Free Text)


Plan: 





Assessment


Probable right leg skin and skin structure infection associated with chronic 

leg ulcers, growing MRSA


history of sepsis secondary to right leg skin/skin structure infection with 

chronic leg ulcers, growing MRSA and sensitive Klebsiella 


history of MRSA cellulitis Sept. 2016


history of Bilateral lower extremity skin and skin structure infection (

Enterobacter, Klebsiella Oxytoca and Group B Strep, as well as MRSA) which was 

treated 9/2015; MRSA and Pseudomonas cellulitis of left leg in Dec 2015, May 

and June 2016


chronic lymphedema of the lower extremities


Morbid obesity with BMI 55


HTN


DM


history of Strep bacteremia


history of hernia surgery


Learning disability





Plan


continue Vancomycin day 6 and will continue to monitor clinically - target 7-10 

days of therapy
Subjective





- Date & Time of Evaluation


Date of Evaluation: 07/14/17


Time of Evaluation: 11:40





- Subjective


Subjective: 





Comfortable on a chair, not in distress, afebrile.





Objective





- Vital Signs/Intake and Output


Vital Signs (last 24 hours): 


 











Temp Pulse Resp BP Pulse Ox


 


 98.5 F   71   18   120/68   95 


 


 07/12/17 16:00  07/12/17 16:00  07/12/17 16:00  07/12/17 16:00  07/12/17 16:00








Intake and Output: 


 











 07/12/17 07/13/17





 18:59 06:59


 


Intake Total  680


 


Output Total  800


 


Balance  -120














- Medications


Medications: 


 Current Medications





Atorvastatin Calcium (Lipitor)  10 mg PO DIN CYNDEE


   PRN Reason: Protocol


   Last Admin: 07/12/17 17:35 Dose:  10 mg


Diltiazem HCl (Cardizem Cd)  120 mg PO DAILY CYNDEE


   PRN Reason: Protocol


   Last Admin: 07/12/17 09:48 Dose:  120 mg


Furosemide (Lasix)  20 mg PO DAILY CYNDEE


   PRN Reason: Protocol


   Last Admin: 07/12/17 09:49 Dose:  20 mg


Gabapentin (Neurontin)  400 mg PO TID CYNDEE


   PRN Reason: Protocol


   Last Admin: 07/12/17 17:36 Dose:  400 mg


Glipizide (Glucotrol)  10 mg PO 0700,1700 CYNDEE


   PRN Reason: Protocol


   Last Admin: 07/12/17 17:34 Dose:  10 mg


Vancomycin HCl 1.5 gm/ Sodium (Chloride)  250 mls @ 167 mls/hr IVPB 0600,1800 

CYNDEE


   PRN Reason: Protocol


   Last Admin: 07/13/17 05:35 Dose:  167 mls/hr


Insulin Human Regular (Humulin R High)  0 units SC ACHS CYNDEE


   PRN Reason: Protocol


   Last Admin: 07/12/17 21:40 Dose:  Not Given


Metformin HCl (Glucophage)  1,000 mg PO ACBD CYNDEE


   PRN Reason: Protocol


   Last Admin: 07/12/17 17:34 Dose:  1,000 mg


Potassium Chloride (K-Dur 20 Meq Er Tab)  20 meq PO 0800 CYNDEE


   PRN Reason: Protocol


   Last Admin: 07/12/17 07:53 Dose:  20 meq


Ramipril (Altace)  10 mg PO DAILY CYNDEE


   PRN Reason: Protocol


   Last Admin: 07/12/17 09:48 Dose:  10 mg


Warfarin Sodium (Coumadin)  10 mg PO 1800 CYNDEE


   PRN Reason: Protocol


   Last Admin: 07/12/17 17:33 Dose:  10 mg


Warfarin Sodium (Coumadin)  2 mg PO 1800 CYNDEE


   PRN Reason: Protocol


   Last Admin: 07/12/17 17:34 Dose:  2 mg











- Labs


Labs: 


 











PT  30.7 Seconds (9.9-11.8)  H*  07/13/17  05:26    


 


INR  2.84  (0.93-1.08)  H  07/13/17  05:26    














- Constitutional


Appears: Non-toxic, No Acute Distress





- Head Exam


Head Exam: NORMAL INSPECTION





- Neck Exam


Neck Exam: absent: Meningismus





- Respiratory Exam


Respiratory Exam: Decreased Breath Sounds





- Cardiovascular Exam


Cardiovascular Exam: +S1, +S2





- GI/Abdominal Exam


GI & Abdominal Exam: Soft.  absent: Tenderness





- Extremities Exam


Additional comments: 





both legs with dry dressings in place





Assessment and Plan





- Assessment and Plan (Free Text)


Plan: 





Assessment


Probable right leg skin and skin structure infection associated with chronic 

leg ulcers, growing MRSA


history of sepsis secondary to right leg skin/skin structure infection with 

chronic leg ulcers, growing MRSA and sensitive Klebsiella 


history of MRSA cellulitis Sept. 2016


history of Bilateral lower extremity skin and skin structure infection (

Enterobacter, Klebsiella Oxytoca and Group B Strep, as well as MRSA) which was 

treated 9/2015; MRSA and Pseudomonas cellulitis of left leg in Dec 2015, May 

and June 2016


chronic lymphedema of the lower extremities


Morbid obesity with BMI 55


HTN


DM


history of Strep bacteremia


history of hernia surgery


Learning disability





Plan


continue Vancomycin day 8 and will continue to monitor clinically - target 7-10 

days of therapy - will d/c antibiotics after today
Subjective





- Date & Time of Evaluation


Date of Evaluation: 07/16/17


Time of Evaluation: 09:15





- Subjective


Subjective: 





Comfortable on a chair, not in distress, no fevers.





Objective





- Vital Signs/Intake and Output


Vital Signs (last 24 hours): 


 











Temp Pulse Resp BP Pulse Ox


 


 98.3 F   67   18   146/73   95 


 


 07/15/17 05:45  07/16/17 09:50  07/15/17 05:45  07/16/17 09:50  07/15/17 05:45











- Medications


Medications: 


 Current Medications





Atorvastatin Calcium (Lipitor)  10 mg PO DIN CYNDEE


   PRN Reason: Protocol


   Last Admin: 07/15/17 17:36 Dose:  10 mg


Diltiazem HCl (Cardizem Cd)  120 mg PO DAILY CYNDEE


   PRN Reason: Protocol


   Last Admin: 07/16/17 09:50 Dose:  120 mg


Furosemide (Lasix)  20 mg PO DAILY CYNDEE


   PRN Reason: Protocol


   Last Admin: 07/16/17 09:50 Dose:  20 mg


Gabapentin (Neurontin)  400 mg PO TID CYNDEE


   PRN Reason: Protocol


   Last Admin: 07/16/17 13:22 Dose:  400 mg


Glipizide (Glucotrol)  10 mg PO 0700,1700 CYNDEE


   PRN Reason: Protocol


   Last Admin: 07/16/17 08:01 Dose:  10 mg


Insulin Human Regular (Humulin R High)  0 units SC ACHS CYNDEE


   PRN Reason: Protocol


   Last Admin: 07/16/17 13:21 Dose:  Not Given


Metformin HCl (Glucophage)  1,000 mg PO ACBD CYNDEE


   PRN Reason: Protocol


   Last Admin: 07/16/17 08:01 Dose:  1,000 mg


Potassium Chloride (K-Dur 20 Meq Er Tab)  20 meq PO 0800 CYNDEE


   PRN Reason: Protocol


   Last Admin: 07/16/17 08:01 Dose:  20 meq


Ramipril (Altace)  10 mg PO DAILY CYNDEE


   PRN Reason: Protocol


   Last Admin: 07/16/17 09:50 Dose:  10 mg











- Labs


Labs: 


 





 07/13/17 05:26 





 











PT  50.5 Seconds (9.9-11.8)  H*  07/16/17  08:19    


 


INR  4.68  (0.93-1.08)  H*  07/16/17  08:19    














- Constitutional


Appears: Non-toxic, No Acute Distress





- Head Exam


Head Exam: NORMAL INSPECTION





- ENT Exam


ENT Exam: Mucous Membranes Moist





- Neck Exam


Neck Exam: absent: Lymphadenopathy, Meningismus





- Respiratory Exam


Respiratory Exam: Decreased Breath Sounds





- Cardiovascular Exam


Cardiovascular Exam: +S1, +S2





- GI/Abdominal Exam


GI & Abdominal Exam: Soft.  absent: Tenderness





- Extremities Exam


Additional comments: 





lower extremities with dry dressings in place 





Assessment and Plan





- Assessment and Plan (Free Text)


Plan: 





Assessment


S/P right leg skin and skin structure infection associated with chronic leg 

ulcers, growing MRSA


history of sepsis secondary to right leg skin/skin structure infection with 

chronic leg ulcers, growing MRSA and sensitive Klebsiella 


history of MRSA cellulitis Sept. 2016


history of Bilateral lower extremity skin and skin structure infection (

Enterobacter, Klebsiella Oxytoca and Group B Strep, as well as MRSA) which was 

treated 9/2015; MRSA and Pseudomonas cellulitis of left leg in Dec 2015, May 

and June 2016


chronic lymphedema of the lower extremities


Morbid obesity with BMI 55


HTN


DM


history of Strep bacteremia


history of hernia surgery


Learning disability





Plan


S/P 8 days of Vancomycin - will continue to monitor off antibiotics since he is 

at risk for nosocomial infections
Subjective





- Date & Time of Evaluation


Date of Evaluation: 07/17/17


Time of Evaluation: 08:45





- Subjective


Subjective: 





Comfortable on a chair, afebrile, not in distress.





Objective





- Vital Signs/Intake and Output


Vital Signs (last 24 hours): 


 











Temp Pulse Resp BP Pulse Ox


 


 98.3 F   67   18   146/73   95 


 


 07/15/17 05:45  07/16/17 09:50  07/15/17 05:45  07/16/17 09:50  07/15/17 05:45











- Medications


Medications: 


 Current Medications





Atorvastatin Calcium (Lipitor)  10 mg PO DIN CYNDEE


   PRN Reason: Protocol


   Last Admin: 07/16/17 17:21 Dose:  10 mg


Diltiazem HCl (Cardizem Cd)  120 mg PO DAILY CYNDEE


   PRN Reason: Protocol


   Last Admin: 07/16/17 09:50 Dose:  120 mg


Furosemide (Lasix)  20 mg PO DAILY CYNDEE


   PRN Reason: Protocol


   Last Admin: 07/16/17 09:50 Dose:  20 mg


Gabapentin (Neurontin)  400 mg PO TID CYNDEE


   PRN Reason: Protocol


   Last Admin: 07/16/17 17:21 Dose:  400 mg


Glipizide (Glucotrol)  10 mg PO 0700,1700 CYNDEE


   PRN Reason: Protocol


   Last Admin: 07/16/17 17:20 Dose:  10 mg


Insulin Human Regular (Humulin R High)  0 units SC ACHS CYNDEE


   PRN Reason: Protocol


   Last Admin: 07/17/17 06:33 Dose:  2 units


Metformin HCl (Glucophage)  1,000 mg PO ACBD CYNDEE


   PRN Reason: Protocol


   Last Admin: 07/16/17 17:20 Dose:  1,000 mg


Potassium Chloride (K-Dur 20 Meq Er Tab)  20 meq PO 0800 CYNDEE


   PRN Reason: Protocol


   Last Admin: 07/16/17 08:01 Dose:  20 meq


Ramipril (Altace)  10 mg PO DAILY CYNDEE


   PRN Reason: Protocol


   Last Admin: 07/16/17 09:50 Dose:  10 mg











- Labs


Labs: 


 





 07/13/17 05:26 





 











PT  47.5 Seconds (9.9-11.8)  H*  07/17/17  07:10    


 


INR  4.40  (0.93-1.08)  H*  07/17/17  07:10    














- Constitutional


Appears: Non-toxic, No Acute Distress





- Head Exam


Head Exam: NORMAL INSPECTION





- Neck Exam


Neck Exam: absent: Meningismus





- Respiratory Exam


Respiratory Exam: Decreased Breath Sounds





- Cardiovascular Exam


Cardiovascular Exam: +S1, +S2





- GI/Abdominal Exam


GI & Abdominal Exam: Soft.  absent: Tenderness





- Extremities Exam


Additional comments: 





both legs with dry dressings in place





Assessment and Plan





- Assessment and Plan (Free Text)


Plan: 





Assessment


S/P right leg skin and skin structure infection associated with chronic leg 

ulcers, grew MRSA


history of sepsis secondary to right leg skin/skin structure infection with 

chronic leg ulcers, growing MRSA and sensitive Klebsiella 


history of MRSA cellulitis Sept. 2016


history of Bilateral lower extremity skin and skin structure infection (

Enterobacter, Klebsiella Oxytoca and Group B Strep, as well as MRSA) which was 

treated 9/2015; MRSA and Pseudomonas cellulitis of left leg in Dec 2015, May 

and June 2016


chronic lymphedema of the lower extremities


Morbid obesity with BMI 55


HTN


DM


history of Strep bacteremia


history of hernia surgery


Learning disability





Plan


S/P 8 days of Vancomycin - will continue to monitor off antibiotics since he is 

at risk for hospital-acquired infections
- - -